# Patient Record
Sex: FEMALE | Race: WHITE | NOT HISPANIC OR LATINO | Employment: OTHER | URBAN - METROPOLITAN AREA
[De-identification: names, ages, dates, MRNs, and addresses within clinical notes are randomized per-mention and may not be internally consistent; named-entity substitution may affect disease eponyms.]

---

## 2019-11-27 ENCOUNTER — APPOINTMENT (OUTPATIENT)
Dept: WOMENS IMAGING | Facility: HOSPITAL | Age: 84
End: 2019-11-27

## 2019-11-27 PROCEDURE — 77063 BREAST TOMOSYNTHESIS BI: CPT | Performed by: RADIOLOGY

## 2019-11-27 PROCEDURE — 77067 SCR MAMMO BI INCL CAD: CPT | Performed by: RADIOLOGY

## 2019-12-23 DIAGNOSIS — N63.20 LEFT BREAST MASS: Primary | ICD-10-CM

## 2019-12-23 DIAGNOSIS — R92.2 INCONCLUSIVE MAMMOGRAM: ICD-10-CM

## 2019-12-26 ENCOUNTER — APPOINTMENT (OUTPATIENT)
Dept: WOMENS IMAGING | Facility: HOSPITAL | Age: 84
End: 2019-12-26

## 2019-12-26 PROCEDURE — 77065 DX MAMMO INCL CAD UNI: CPT | Performed by: RADIOLOGY

## 2019-12-26 PROCEDURE — 76641 ULTRASOUND BREAST COMPLETE: CPT | Performed by: RADIOLOGY

## 2019-12-26 PROCEDURE — G0279 TOMOSYNTHESIS, MAMMO: HCPCS | Performed by: RADIOLOGY

## 2019-12-30 DIAGNOSIS — R92.2 INCONCLUSIVE MAMMOGRAM: ICD-10-CM

## 2019-12-30 DIAGNOSIS — N63.20 LEFT BREAST MASS: ICD-10-CM

## 2019-12-30 DIAGNOSIS — N63.20 LEFT BREAST MASS: Primary | ICD-10-CM

## 2020-01-09 ENCOUNTER — APPOINTMENT (OUTPATIENT)
Dept: WOMENS IMAGING | Facility: HOSPITAL | Age: 85
End: 2020-01-09

## 2020-01-09 PROCEDURE — 19084 BX BREAST ADD LESION US IMAG: CPT | Performed by: RADIOLOGY

## 2020-01-09 PROCEDURE — 19083 BX BREAST 1ST LESION US IMAG: CPT | Performed by: RADIOLOGY

## 2020-01-13 ENCOUNTER — TRANSCRIBE ORDERS (OUTPATIENT)
Dept: MAMMOGRAPHY | Facility: CLINIC | Age: 85
End: 2020-01-13

## 2020-01-13 DIAGNOSIS — C50.919 MALIGNANT NEOPLASM OF FEMALE BREAST, UNSPECIFIED ESTROGEN RECEPTOR STATUS, UNSPECIFIED LATERALITY, UNSPECIFIED SITE OF BREAST (HCC): Primary | ICD-10-CM

## 2020-01-15 DIAGNOSIS — N63.20 LEFT BREAST MASS: ICD-10-CM

## 2020-01-23 ENCOUNTER — NURSE NAVIGATOR (OUTPATIENT)
Dept: OTHER | Facility: HOSPITAL | Age: 85
End: 2020-01-23

## 2020-01-23 ENCOUNTER — OFFICE VISIT (OUTPATIENT)
Dept: MAMMOGRAPHY | Facility: CLINIC | Age: 85
End: 2020-01-23

## 2020-01-23 VITALS
SYSTOLIC BLOOD PRESSURE: 138 MMHG | WEIGHT: 143 LBS | HEART RATE: 76 BPM | TEMPERATURE: 98.9 F | HEIGHT: 66 IN | BODY MASS INDEX: 22.98 KG/M2 | DIASTOLIC BLOOD PRESSURE: 80 MMHG | OXYGEN SATURATION: 98 %

## 2020-01-23 DIAGNOSIS — Z17.0 MALIGNANT NEOPLASM OF UPPER-OUTER QUADRANT OF LEFT BREAST IN FEMALE, ESTROGEN RECEPTOR POSITIVE (HCC): Primary | ICD-10-CM

## 2020-01-23 DIAGNOSIS — C50.412 MALIGNANT NEOPLASM OF UPPER-OUTER QUADRANT OF LEFT BREAST IN FEMALE, ESTROGEN RECEPTOR POSITIVE (HCC): Primary | ICD-10-CM

## 2020-01-23 PROCEDURE — 99205 OFFICE O/P NEW HI 60 MIN: CPT | Performed by: SURGERY

## 2020-01-23 RX ORDER — AMLODIPINE BESYLATE 2.5 MG/1
2.5 TABLET ORAL DAILY
COMMUNITY
Start: 2019-11-09 | End: 2020-05-19 | Stop reason: SDUPTHER

## 2020-01-23 RX ORDER — LEVOTHYROXINE SODIUM 0.07 MG/1
75 TABLET ORAL DAILY
COMMUNITY
End: 2020-06-01 | Stop reason: SDUPTHER

## 2020-01-23 NOTE — PROGRESS NOTES
Chief Complaint: Mela Muse is a 86 y.o.. female here today for Breast Cancer (Left breast)        History of Present Illness:  Patient presents with newly diagnosed breast cancer. 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  She is a nice 86-year-old white female who has not had mammograms since .  She noted some indentation to the skin of the breast in 2019.  This area was not associated with any pain but she did feel a marble sized lump at the time.  She then had imaging studies which revealed a 1.5 cm high density lesion in the upper outer quadrant of the right breast.  Diagnostic imaging showed persistence of this lesion.  An ultrasound was also obtained and that showed a 15 mm irregular hypoechoic mass correlating with the mammogram abnormality.  Note was also made of a 7 mm rounded mass.  Biopsy was recommended on both of these areas.  The lump designated 12:00 revealed a mixed invasive ductal and lobular invasive cancer that was low-grade.  The largest focus of cancer measured 15 mm.  There was no LVI or description of DCIS.  The tumor was ER positive at 95%, MA positive at 96%, and HER-2 negative.  The Ki-67 was 3%.  I have personally reviewed the imaging studies and agree that the 12:00 lesion was very suspicious in appearance.  The area at the 1 o'clock position certainly has benign features and a benign biopsy as well.  The family history is significant for a sister who  of breast cancer at 48 as well as a grandmother on the maternal side who had breast cancer.  Patient herself is also had melanoma and her daughter has had melanoma.  Her other daughter has CLL.      Review of Systems:  Review of Systems   Skin:        The patient denies any noticeable changes to the skin of the breast.    Neurological: Positive for dizziness.   All other systems reviewed and are negative.     I have reviewed the ROS as documented by the MA/LPN/RN Kush Plasencia MD      Past  Medical and Surgical History:  Breast Biopsy History:  Patient has had the following breast biopsies:2020 Left-malignant  Breast Cancer HIstory:  Patient does not have a past medical history of breast cancer.  Breast Operations, and year:  None  Social History     Tobacco Use   Smoking Status Former Smoker   • Last attempt to quit:    • Years since quittin.0     Obstetric History:  Patient is postmenopausal, entered menopause naturally at age: 50   Number of pregnancies:4  Number of live births: 4  Number of abortions or miscarriages: 0  Age of delivery of first child: 21  Patient did not breast feed.  Length of time taking birth control pills:None  Patient has never taken hormone replacement    Past Surgical History:   Procedure Laterality Date   • SKIN GRAFT         Past Medical History:   Diagnosis Date   • Hypertension    • Hypothyroidism    • Melanoma (CMS/HCC)        Prior Hospitalizations, other than for surgery or childbirth, and year:  None    Social History:  Patient is .  Patient has two daughters. and Patient has two sons.    Family History:  Family History   Problem Relation Age of Onset   • Heart failure Father    • Breast cancer Sister    • Esophageal cancer Brother    • Melanoma Daughter    • Breast cancer Maternal Grandmother        Vital Signs:  Vitals:    20 1221   BP: 138/80   Pulse: 76   Temp: 98.9 °F (37.2 °C)   SpO2: 98%       Medications:    Current Outpatient Prescriptions:     Current Outpatient Medications:   •  amLODIPine (NORVASC) 2.5 MG tablet, , Disp: , Rfl:   •  levothyroxine (SYNTHROID, LEVOTHROID) 75 MCG tablet, Take 75 mcg by mouth Daily., Disp: , Rfl:     Physical Examination:  General Appearance:   Patient is in no distress.  She is well kept and has an average build.   Psychiatric:  Patient with appropriate mood and affect. Alert and oriented to self, time, and place.    Breast, RIGHT:  small sized, symmetric with the contralateral side.  Breast skin is  without erythema, edema, rashes.  There are no visible abnormalities upon inspection during the arm-raising maneuver or with hands on hips in the sitting position. There is no nipple retraction, discharge or nipple/areolar skin changes.There are no masses palpable in the sitting or supine positions.    Breast, LEFT:  small sized, symmetric with the contralateral side.  Breast skin is without erythema, edema, rashes.  There are no visible abnormalities upon inspection during the arm-raising maneuver or with hands on hips in the sitting position. There is no nipple retraction, discharge or nipple/areolar skin changes.There is an obvious mass measuring 1.8 cm in the upper outer quadrant about 2 fingerbreadths above the edge of the areola.  It is irregular but mobile.  There is a bit of bruising to the skin from the biopsy.    Lymphatic:  There is no axillary, cervical, infraclavicular, or supraclavicular adenopathy bilaterally.  Eyes:  Pupils are round and reactive to light.  Cardiovascular:  Heart rate and rhythm are regular.  Respiratory:  Lungs are clear bilaterally with no crackles or wheezes in any lung field.  Gastrointestinal:  Abdomen is soft, nondistended, and nontender.  There was no obvious hepatosplenomegaly or abdominal mass.  There are no scars from previous surgery.    Musculoskeletal:  Good strength in all 4 extremities.   There is good range of motion in both shoulders.    Skin:  No new skin lesions or rashes on the skin excluding the breast (see breast exam above).    Cancer Staging  Primary Tumor: T1c  Regional Lymph Nodes:  N0  Distant Mets: M0  Clinical Stage Group: IA    Assessment:  1. Malignant neoplasm of upper-outer quadrant of left breast in female, estrogen receptor positive (CMS/HCC)          Plan:  She was accompanied today by her  and 2 daughters.  The office visit lasted 1 hour and 5 minutes with 50 minutes spent in face-to-face consultation.      We began the conversation  discussing her pathology report.  We talked about the origin of most of breast cancers from either the ducts or the lobules.  The difference between invasive and in situ disease was explained and the visual was drawn for her.  When invasion has occurred, the lymph nodes need to be evaluated.  When there is in situ disease the lymph nodes should be considered if the area of concern is widespread or it is high-grade.  We also discussed the significance of hormone receptors.  They often can give us some idea how the breast cancer will behave and potentially lead us to offer neoadjuvant chemotherapy.    Next we discussed the surgical options which include breast conserving therapy versus a mastectomy.  With breast conserving therapy we are talking about a lumpectomy with margins, lymph node evaluation, and radiation treatment.  The radiation treatment is generally given to the entire breast for 6 weeks.  The side effects consist of local skin change and potential injury to nearby structures such as the lung or the heart.  A mastectomy would involve removing the breast tissues with preservation of the pectoral muscles and evaluation of the lymph nodes if indicated.  The potential for reconstruction by either an implant or autologous tissue was discussed.  This could be performed on a delayed basis or immediately depending on a multitude of factors.  The survival rates for these 2 procedures are equivalent but there are incidences where one may be favored over the other.  There are times when a lumpectomy is not possible due to the large tumor to breast ratio or the location of the tumor.  Previous radiation to the chest wall or collagen disorders such as scleroderma would make radiation treatment and possible and therefore exclude breast conserving therapy as an option.    After discussing the situation with the patient, I think she is a candidate for either breast conserving surgery or mastectomy.  The patient seems to  be leaning towards breast conserving surgery but really want to talk over the situation with the daughter-in-law who is an OB/GYN physician.  She did give me permission to talk with her if she wanted to speak with me.  I do think the patient is a candidate for genetic testing.  I think it would mainly be for the sake of her daughters rather than becoming something that would change her decision regarding breast surgery for herself.  As it stands now, we will proceed with genetic testing when possible but I will await her decision when she calls me back.          EMR Dragon/transcription disclaimer:    Much of this encounter note is an electronic transcription/translocation of spoken language to printed text.  The electronic translation of spoken language may permit erroneous, or at times, nonsensical words or phrases to be inadvertently transcribed.  Although I have reviewed the note from such areas, some may still exist.

## 2020-01-23 NOTE — PROGRESS NOTES
Referral received from Dr. Plasencia's office. Met Ms. Muse and her family during her surgery consult. I introduced myself and navigational services. After the consult she is leaning toward having a lumpectomy. She is comfortable with this plan and had all of her questions or concerns answered. She has a good understanding of her pathology and treatment options presented to her by Dr. Plasencia.     We discussed integrative therapies and other services at the [facility]. I gave her a navigation folder with the following information:    [facility] Support Network, Sharing Our Stories Breast Cancer Support Group, Cancer and Restorative Exercise (CARE), [employer] Exercise program, Together for Breast Cancer Survival, For Women Facing Breast Cancer, Road to Recovery, [employer], [facility], Massage Therapy, Reiki Therapy, [facility] [place], Cancer Nutrition, Survivorship Clinic, Genetic Counseling, Cancer and Career.     She was interested in getting genetic testing done but it will not change her surgery decision. That order has been placed.    She verbalized appreciation for navigational services and she has my contact information and will call with any questions that arise.

## 2020-01-27 ENCOUNTER — PREP FOR SURGERY (OUTPATIENT)
Dept: OTHER | Facility: HOSPITAL | Age: 85
End: 2020-01-27

## 2020-01-27 DIAGNOSIS — C50.412 MALIGNANT NEOPLASM OF UPPER-OUTER QUADRANT OF LEFT BREAST IN FEMALE, ESTROGEN RECEPTOR POSITIVE (HCC): Primary | ICD-10-CM

## 2020-01-27 DIAGNOSIS — Z17.0 MALIGNANT NEOPLASM OF UPPER-OUTER QUADRANT OF LEFT BREAST IN FEMALE, ESTROGEN RECEPTOR POSITIVE (HCC): Primary | ICD-10-CM

## 2020-01-27 RX ORDER — ACETAMINOPHEN 500 MG
1000 TABLET ORAL ONCE
Status: CANCELLED | OUTPATIENT
Start: 2020-02-05 | End: 2020-01-27

## 2020-01-27 RX ORDER — CEFAZOLIN SODIUM 2 G/100ML
2 INJECTION, SOLUTION INTRAVENOUS ONCE
Status: CANCELLED | OUTPATIENT
Start: 2020-02-05 | End: 2020-01-27

## 2020-01-27 RX ORDER — DIAZEPAM 5 MG/1
5 TABLET ORAL ONCE
Status: CANCELLED | OUTPATIENT
Start: 2020-02-05 | End: 2020-01-27

## 2020-01-28 PROBLEM — Z17.0 MALIGNANT NEOPLASM OF UPPER-OUTER QUADRANT OF LEFT BREAST IN FEMALE, ESTROGEN RECEPTOR POSITIVE: Status: ACTIVE | Noted: 2020-01-28

## 2020-01-28 PROBLEM — C50.412 MALIGNANT NEOPLASM OF UPPER-OUTER QUADRANT OF LEFT BREAST IN FEMALE, ESTROGEN RECEPTOR POSITIVE: Status: ACTIVE | Noted: 2020-01-28

## 2020-01-31 ENCOUNTER — APPOINTMENT (OUTPATIENT)
Dept: PREADMISSION TESTING | Facility: HOSPITAL | Age: 85
End: 2020-01-31

## 2020-01-31 VITALS
BODY MASS INDEX: 22.5 KG/M2 | RESPIRATION RATE: 18 BRPM | TEMPERATURE: 97 F | SYSTOLIC BLOOD PRESSURE: 142 MMHG | DIASTOLIC BLOOD PRESSURE: 63 MMHG | WEIGHT: 140 LBS | OXYGEN SATURATION: 98 % | HEIGHT: 66 IN | HEART RATE: 60 BPM

## 2020-01-31 DIAGNOSIS — C50.412 MALIGNANT NEOPLASM OF UPPER-OUTER QUADRANT OF LEFT BREAST IN FEMALE, ESTROGEN RECEPTOR POSITIVE (HCC): ICD-10-CM

## 2020-01-31 DIAGNOSIS — Z17.0 MALIGNANT NEOPLASM OF UPPER-OUTER QUADRANT OF LEFT BREAST IN FEMALE, ESTROGEN RECEPTOR POSITIVE (HCC): ICD-10-CM

## 2020-01-31 LAB
ALBUMIN SERPL-MCNC: 4.2 G/DL (ref 3.5–5.2)
ALBUMIN/GLOB SERPL: 1.6 G/DL
ALP SERPL-CCNC: 58 U/L (ref 39–117)
ALT SERPL W P-5'-P-CCNC: 6 U/L (ref 1–33)
ANION GAP SERPL CALCULATED.3IONS-SCNC: 12.2 MMOL/L (ref 5–15)
AST SERPL-CCNC: 14 U/L (ref 1–32)
BILIRUB SERPL-MCNC: 0.4 MG/DL (ref 0.2–1.2)
BUN BLD-MCNC: 22 MG/DL (ref 8–23)
BUN/CREAT SERPL: 21.6 (ref 7–25)
CALCIUM SPEC-SCNC: 9.4 MG/DL (ref 8.6–10.5)
CHLORIDE SERPL-SCNC: 104 MMOL/L (ref 98–107)
CO2 SERPL-SCNC: 23.8 MMOL/L (ref 22–29)
CREAT BLD-MCNC: 1.02 MG/DL (ref 0.57–1)
DEPRECATED RDW RBC AUTO: 44.5 FL (ref 37–54)
ERYTHROCYTE [DISTWIDTH] IN BLOOD BY AUTOMATED COUNT: 12.9 % (ref 12.3–15.4)
GFR SERPL CREATININE-BSD FRML MDRD: 51 ML/MIN/1.73
GLOBULIN UR ELPH-MCNC: 2.7 GM/DL
GLUCOSE BLD-MCNC: 106 MG/DL (ref 65–99)
HCT VFR BLD AUTO: 42.1 % (ref 34–46.6)
HGB BLD-MCNC: 14 G/DL (ref 12–15.9)
MCH RBC QN AUTO: 31.2 PG (ref 26.6–33)
MCHC RBC AUTO-ENTMCNC: 33.3 G/DL (ref 31.5–35.7)
MCV RBC AUTO: 93.8 FL (ref 79–97)
PLATELET # BLD AUTO: 286 10*3/MM3 (ref 140–450)
PMV BLD AUTO: 9.7 FL (ref 6–12)
POTASSIUM BLD-SCNC: 4.3 MMOL/L (ref 3.5–5.2)
PROT SERPL-MCNC: 6.9 G/DL (ref 6–8.5)
RBC # BLD AUTO: 4.49 10*6/MM3 (ref 3.77–5.28)
SODIUM BLD-SCNC: 140 MMOL/L (ref 136–145)
WBC NRBC COR # BLD: 5.81 10*3/MM3 (ref 3.4–10.8)

## 2020-01-31 PROCEDURE — 80053 COMPREHEN METABOLIC PANEL: CPT | Performed by: SURGERY

## 2020-01-31 PROCEDURE — 93010 ELECTROCARDIOGRAM REPORT: CPT | Performed by: INTERNAL MEDICINE

## 2020-01-31 PROCEDURE — 85027 COMPLETE CBC AUTOMATED: CPT | Performed by: SURGERY

## 2020-01-31 PROCEDURE — 36415 COLL VENOUS BLD VENIPUNCTURE: CPT

## 2020-01-31 PROCEDURE — 93005 ELECTROCARDIOGRAM TRACING: CPT

## 2020-01-31 RX ORDER — MECLIZINE HCL 12.5 MG/1
12.5 TABLET ORAL AS NEEDED
COMMUNITY
End: 2022-07-14 | Stop reason: SDUPTHER

## 2020-02-03 DIAGNOSIS — C50.412 MALIGNANT NEOPLASM OF UPPER-OUTER QUADRANT OF LEFT BREAST IN FEMALE, ESTROGEN RECEPTOR POSITIVE (HCC): Primary | ICD-10-CM

## 2020-02-03 DIAGNOSIS — Z17.0 MALIGNANT NEOPLASM OF UPPER-OUTER QUADRANT OF LEFT BREAST IN FEMALE, ESTROGEN RECEPTOR POSITIVE (HCC): Primary | ICD-10-CM

## 2020-02-05 ENCOUNTER — APPOINTMENT (OUTPATIENT)
Dept: GENERAL RADIOLOGY | Facility: HOSPITAL | Age: 85
End: 2020-02-05

## 2020-02-05 ENCOUNTER — HOSPITAL ENCOUNTER (OUTPATIENT)
Facility: HOSPITAL | Age: 85
Setting detail: HOSPITAL OUTPATIENT SURGERY
Discharge: HOME | End: 2020-02-05
Attending: SURGERY | Admitting: SURGERY

## 2020-02-05 ENCOUNTER — HOSPITAL ENCOUNTER (OUTPATIENT)
Dept: MAMMOGRAPHY | Facility: HOSPITAL | Age: 85
Discharge: HOME | End: 2020-02-05

## 2020-02-05 ENCOUNTER — ANESTHESIA EVENT (OUTPATIENT)
Dept: PERIOP | Facility: HOSPITAL | Age: 85
End: 2020-02-05

## 2020-02-05 ENCOUNTER — ANESTHESIA (OUTPATIENT)
Dept: PERIOP | Facility: HOSPITAL | Age: 85
End: 2020-02-05

## 2020-02-05 VITALS
WEIGHT: 141 LBS | RESPIRATION RATE: 16 BRPM | SYSTOLIC BLOOD PRESSURE: 153 MMHG | HEART RATE: 61 BPM | DIASTOLIC BLOOD PRESSURE: 63 MMHG | BODY MASS INDEX: 22.66 KG/M2 | TEMPERATURE: 97.6 F | OXYGEN SATURATION: 97 % | HEIGHT: 66 IN

## 2020-02-05 DIAGNOSIS — C80.1 CANCER (HCC): ICD-10-CM

## 2020-02-05 DIAGNOSIS — C50.412 MALIGNANT NEOPLASM OF UPPER-OUTER QUADRANT OF LEFT BREAST IN FEMALE, ESTROGEN RECEPTOR POSITIVE (HCC): ICD-10-CM

## 2020-02-05 DIAGNOSIS — Z17.0 MALIGNANT NEOPLASM OF UPPER-OUTER QUADRANT OF LEFT BREAST IN FEMALE, ESTROGEN RECEPTOR POSITIVE (HCC): ICD-10-CM

## 2020-02-05 PROCEDURE — 25010000002 ONDANSETRON PER 1 MG: Performed by: NURSE ANESTHETIST, CERTIFIED REGISTERED

## 2020-02-05 PROCEDURE — 19301 PARTIAL MASTECTOMY: CPT | Performed by: SURGERY

## 2020-02-05 PROCEDURE — 25010000002 PROPOFOL 10 MG/ML EMULSION: Performed by: NURSE ANESTHETIST, CERTIFIED REGISTERED

## 2020-02-05 PROCEDURE — 88341 IMHCHEM/IMCYTCHM EA ADD ANTB: CPT | Performed by: SURGERY

## 2020-02-05 PROCEDURE — 76098 X-RAY EXAM SURGICAL SPECIMEN: CPT

## 2020-02-05 PROCEDURE — 25010000002 FENTANYL CITRATE (PF) 100 MCG/2ML SOLUTION: Performed by: NURSE ANESTHETIST, CERTIFIED REGISTERED

## 2020-02-05 PROCEDURE — 88307 TISSUE EXAM BY PATHOLOGIST: CPT | Performed by: SURGERY

## 2020-02-05 PROCEDURE — 88342 IMHCHEM/IMCYTCHM 1ST ANTB: CPT | Performed by: SURGERY

## 2020-02-05 PROCEDURE — 25010000003 LIDOCAINE 1 % SOLUTION: Performed by: SURGERY

## 2020-02-05 PROCEDURE — 25010000003 CEFAZOLIN IN DEXTROSE 2-4 GM/100ML-% SOLUTION: Performed by: SURGERY

## 2020-02-05 PROCEDURE — 25010000002 DEXAMETHASONE PER 1 MG: Performed by: NURSE ANESTHETIST, CERTIFIED REGISTERED

## 2020-02-05 RX ORDER — PROMETHAZINE HYDROCHLORIDE 25 MG/ML
6.25 INJECTION, SOLUTION INTRAMUSCULAR; INTRAVENOUS
Status: DISCONTINUED | OUTPATIENT
Start: 2020-02-05 | End: 2020-02-05 | Stop reason: HOSPADM

## 2020-02-05 RX ORDER — HYDRALAZINE HYDROCHLORIDE 20 MG/ML
5 INJECTION INTRAMUSCULAR; INTRAVENOUS
Status: DISCONTINUED | OUTPATIENT
Start: 2020-02-05 | End: 2020-02-05 | Stop reason: HOSPADM

## 2020-02-05 RX ORDER — SODIUM CHLORIDE, SODIUM LACTATE, POTASSIUM CHLORIDE, CALCIUM CHLORIDE 600; 310; 30; 20 MG/100ML; MG/100ML; MG/100ML; MG/100ML
9 INJECTION, SOLUTION INTRAVENOUS CONTINUOUS
Status: DISCONTINUED | OUTPATIENT
Start: 2020-02-05 | End: 2020-02-05 | Stop reason: HOSPADM

## 2020-02-05 RX ORDER — PROMETHAZINE HYDROCHLORIDE 25 MG/1
25 SUPPOSITORY RECTAL ONCE AS NEEDED
Status: DISCONTINUED | OUTPATIENT
Start: 2020-02-05 | End: 2020-02-05 | Stop reason: HOSPADM

## 2020-02-05 RX ORDER — SODIUM CHLORIDE 0.9 % (FLUSH) 0.9 %
3-10 SYRINGE (ML) INJECTION AS NEEDED
Status: DISCONTINUED | OUTPATIENT
Start: 2020-02-05 | End: 2020-02-05 | Stop reason: HOSPADM

## 2020-02-05 RX ORDER — DIPHENHYDRAMINE HCL 25 MG
25 CAPSULE ORAL
Status: DISCONTINUED | OUTPATIENT
Start: 2020-02-05 | End: 2020-02-05 | Stop reason: HOSPADM

## 2020-02-05 RX ORDER — LIDOCAINE HYDROCHLORIDE 20 MG/ML
INJECTION, SOLUTION INFILTRATION; PERINEURAL AS NEEDED
Status: DISCONTINUED | OUTPATIENT
Start: 2020-02-05 | End: 2020-02-05 | Stop reason: SURG

## 2020-02-05 RX ORDER — LABETALOL HYDROCHLORIDE 5 MG/ML
5 INJECTION, SOLUTION INTRAVENOUS
Status: DISCONTINUED | OUTPATIENT
Start: 2020-02-05 | End: 2020-02-05 | Stop reason: HOSPADM

## 2020-02-05 RX ORDER — DIAZEPAM 5 MG/1
5 TABLET ORAL ONCE
Status: COMPLETED | OUTPATIENT
Start: 2020-02-05 | End: 2020-02-05

## 2020-02-05 RX ORDER — DEXAMETHASONE SODIUM PHOSPHATE 10 MG/ML
INJECTION INTRAMUSCULAR; INTRAVENOUS AS NEEDED
Status: DISCONTINUED | OUTPATIENT
Start: 2020-02-05 | End: 2020-02-05 | Stop reason: SURG

## 2020-02-05 RX ORDER — LIDOCAINE HYDROCHLORIDE 10 MG/ML
3 INJECTION, SOLUTION INFILTRATION; PERINEURAL ONCE
Status: COMPLETED | OUTPATIENT
Start: 2020-02-05 | End: 2020-02-05

## 2020-02-05 RX ORDER — ACETAMINOPHEN 500 MG
1000 TABLET ORAL ONCE
Status: COMPLETED | OUTPATIENT
Start: 2020-02-05 | End: 2020-02-05

## 2020-02-05 RX ORDER — ONDANSETRON 2 MG/ML
INJECTION INTRAMUSCULAR; INTRAVENOUS AS NEEDED
Status: DISCONTINUED | OUTPATIENT
Start: 2020-02-05 | End: 2020-02-05 | Stop reason: SURG

## 2020-02-05 RX ORDER — NALOXONE HCL 0.4 MG/ML
0.2 VIAL (ML) INJECTION AS NEEDED
Status: DISCONTINUED | OUTPATIENT
Start: 2020-02-05 | End: 2020-02-05 | Stop reason: HOSPADM

## 2020-02-05 RX ORDER — FAMOTIDINE 10 MG/ML
20 INJECTION, SOLUTION INTRAVENOUS ONCE
Status: COMPLETED | OUTPATIENT
Start: 2020-02-05 | End: 2020-02-05

## 2020-02-05 RX ORDER — MIDAZOLAM HYDROCHLORIDE 1 MG/ML
2 INJECTION INTRAMUSCULAR; INTRAVENOUS
Status: DISCONTINUED | OUTPATIENT
Start: 2020-02-05 | End: 2020-02-05 | Stop reason: HOSPADM

## 2020-02-05 RX ORDER — SODIUM CHLORIDE 0.9 % (FLUSH) 0.9 %
3 SYRINGE (ML) INJECTION EVERY 12 HOURS SCHEDULED
Status: DISCONTINUED | OUTPATIENT
Start: 2020-02-05 | End: 2020-02-05 | Stop reason: HOSPADM

## 2020-02-05 RX ORDER — EPHEDRINE SULFATE 50 MG/ML
5 INJECTION, SOLUTION INTRAVENOUS ONCE AS NEEDED
Status: DISCONTINUED | OUTPATIENT
Start: 2020-02-05 | End: 2020-02-05 | Stop reason: HOSPADM

## 2020-02-05 RX ORDER — CEFAZOLIN SODIUM 2 G/100ML
2 INJECTION, SOLUTION INTRAVENOUS ONCE
Status: COMPLETED | OUTPATIENT
Start: 2020-02-05 | End: 2020-02-05

## 2020-02-05 RX ORDER — PROMETHAZINE HYDROCHLORIDE 25 MG/1
25 TABLET ORAL ONCE AS NEEDED
Status: DISCONTINUED | OUTPATIENT
Start: 2020-02-05 | End: 2020-02-05 | Stop reason: HOSPADM

## 2020-02-05 RX ORDER — FENTANYL CITRATE 50 UG/ML
50 INJECTION, SOLUTION INTRAMUSCULAR; INTRAVENOUS
Status: DISCONTINUED | OUTPATIENT
Start: 2020-02-05 | End: 2020-02-05 | Stop reason: HOSPADM

## 2020-02-05 RX ORDER — ACETAMINOPHEN 325 MG/1
650 TABLET ORAL ONCE AS NEEDED
Status: DISCONTINUED | OUTPATIENT
Start: 2020-02-05 | End: 2020-02-05 | Stop reason: HOSPADM

## 2020-02-05 RX ORDER — HYDROCODONE BITARTRATE AND ACETAMINOPHEN 5; 325 MG/1; MG/1
1-2 TABLET ORAL EVERY 4 HOURS PRN
Qty: 8 TABLET | Refills: 0 | Status: SHIPPED | OUTPATIENT
Start: 2020-02-05 | End: 2020-02-27

## 2020-02-05 RX ORDER — ACETAMINOPHEN 500 MG
1000 TABLET ORAL EVERY 6 HOURS PRN
COMMUNITY
End: 2021-09-23 | Stop reason: HOSPADM

## 2020-02-05 RX ORDER — HYDROMORPHONE HYDROCHLORIDE 1 MG/ML
0.5 INJECTION, SOLUTION INTRAMUSCULAR; INTRAVENOUS; SUBCUTANEOUS
Status: DISCONTINUED | OUTPATIENT
Start: 2020-02-05 | End: 2020-02-05 | Stop reason: HOSPADM

## 2020-02-05 RX ORDER — MIDAZOLAM HYDROCHLORIDE 1 MG/ML
1 INJECTION INTRAMUSCULAR; INTRAVENOUS
Status: DISCONTINUED | OUTPATIENT
Start: 2020-02-05 | End: 2020-02-05 | Stop reason: HOSPADM

## 2020-02-05 RX ORDER — BUPIVACAINE HYDROCHLORIDE 2.5 MG/ML
INJECTION, SOLUTION INFILTRATION; PERINEURAL AS NEEDED
Status: DISCONTINUED | OUTPATIENT
Start: 2020-02-05 | End: 2020-02-05 | Stop reason: HOSPADM

## 2020-02-05 RX ORDER — FENTANYL CITRATE 50 UG/ML
INJECTION, SOLUTION INTRAMUSCULAR; INTRAVENOUS AS NEEDED
Status: DISCONTINUED | OUTPATIENT
Start: 2020-02-05 | End: 2020-02-05 | Stop reason: SURG

## 2020-02-05 RX ORDER — ONDANSETRON 4 MG/1
4 TABLET, FILM COATED ORAL EVERY 8 HOURS PRN
Qty: 6 TABLET | Refills: 1 | Status: SHIPPED | OUTPATIENT
Start: 2020-02-05 | End: 2020-02-07

## 2020-02-05 RX ORDER — DIPHENHYDRAMINE HYDROCHLORIDE 50 MG/ML
12.5 INJECTION INTRAMUSCULAR; INTRAVENOUS
Status: DISCONTINUED | OUTPATIENT
Start: 2020-02-05 | End: 2020-02-05 | Stop reason: HOSPADM

## 2020-02-05 RX ORDER — FLUMAZENIL 0.1 MG/ML
0.2 INJECTION INTRAVENOUS AS NEEDED
Status: DISCONTINUED | OUTPATIENT
Start: 2020-02-05 | End: 2020-02-05 | Stop reason: HOSPADM

## 2020-02-05 RX ORDER — PROMETHAZINE HYDROCHLORIDE 25 MG/ML
12.5 INJECTION, SOLUTION INTRAMUSCULAR; INTRAVENOUS ONCE AS NEEDED
Status: DISCONTINUED | OUTPATIENT
Start: 2020-02-05 | End: 2020-02-05 | Stop reason: HOSPADM

## 2020-02-05 RX ORDER — MAGNESIUM HYDROXIDE 1200 MG/15ML
LIQUID ORAL AS NEEDED
Status: DISCONTINUED | OUTPATIENT
Start: 2020-02-05 | End: 2020-02-05 | Stop reason: HOSPADM

## 2020-02-05 RX ORDER — PROPOFOL 10 MG/ML
VIAL (ML) INTRAVENOUS AS NEEDED
Status: DISCONTINUED | OUTPATIENT
Start: 2020-02-05 | End: 2020-02-05 | Stop reason: SURG

## 2020-02-05 RX ORDER — LIDOCAINE HYDROCHLORIDE 10 MG/ML
0.5 INJECTION, SOLUTION EPIDURAL; INFILTRATION; INTRACAUDAL; PERINEURAL ONCE AS NEEDED
Status: DISCONTINUED | OUTPATIENT
Start: 2020-02-05 | End: 2020-02-05 | Stop reason: HOSPADM

## 2020-02-05 RX ORDER — ONDANSETRON 2 MG/ML
4 INJECTION INTRAMUSCULAR; INTRAVENOUS ONCE AS NEEDED
Status: COMPLETED | OUTPATIENT
Start: 2020-02-05 | End: 2020-02-05

## 2020-02-05 RX ADMIN — FENTANYL CITRATE 25 MCG: 50 INJECTION INTRAMUSCULAR; INTRAVENOUS at 12:58

## 2020-02-05 RX ADMIN — ONDANSETRON HYDROCHLORIDE 4 MG: 2 SOLUTION INTRAMUSCULAR; INTRAVENOUS at 13:31

## 2020-02-05 RX ADMIN — ACETAMINOPHEN 1000 MG: 500 TABLET, FILM COATED ORAL at 09:27

## 2020-02-05 RX ADMIN — DEXAMETHASONE SODIUM PHOSPHATE 8 MG: 10 INJECTION INTRAMUSCULAR; INTRAVENOUS at 12:27

## 2020-02-05 RX ADMIN — CEFAZOLIN SODIUM 2 G: 2 INJECTION, SOLUTION INTRAVENOUS at 12:22

## 2020-02-05 RX ADMIN — SODIUM CHLORIDE, POTASSIUM CHLORIDE, SODIUM LACTATE AND CALCIUM CHLORIDE 9 ML/HR: 600; 310; 30; 20 INJECTION, SOLUTION INTRAVENOUS at 10:00

## 2020-02-05 RX ADMIN — ONDANSETRON 4 MG: 2 INJECTION INTRAMUSCULAR; INTRAVENOUS at 14:40

## 2020-02-05 RX ADMIN — FAMOTIDINE 20 MG: 10 INJECTION INTRAVENOUS at 11:35

## 2020-02-05 RX ADMIN — DIAZEPAM 5 MG: 5 TABLET ORAL at 09:28

## 2020-02-05 RX ADMIN — PROPOFOL 150 MG: 10 INJECTION, EMULSION INTRAVENOUS at 12:24

## 2020-02-05 RX ADMIN — LIDOCAINE HYDROCHLORIDE 3 ML: 10 INJECTION, SOLUTION INFILTRATION; PERINEURAL at 10:45

## 2020-02-05 RX ADMIN — LIDOCAINE HYDROCHLORIDE 60 MG: 20 INJECTION, SOLUTION INFILTRATION; PERINEURAL at 12:24

## 2020-02-05 RX ADMIN — FENTANYL CITRATE 25 MCG: 50 INJECTION INTRAMUSCULAR; INTRAVENOUS at 12:40

## 2020-02-05 NOTE — ANESTHESIA PROCEDURE NOTES
Airway  Urgency: elective    Date/Time: 2/5/2020 12:26 PM  Airway not difficult    General Information and Staff    Patient location during procedure: OR  Anesthesiologist: Maxim Nj MD  CRNA: Faina Arroyo CRNA    Indications and Patient Condition  Indications for airway management: airway protection    Preoxygenated: yes  MILS not maintained throughout  Mask difficulty assessment: 0 - not attempted    Final Airway Details  Final airway type: supraglottic airway      Successful airway: unique  Size 4    Number of attempts at approach: 1  Assessment: lips, teeth, and gum same as pre-op and atraumatic intubation

## 2020-02-05 NOTE — ANESTHESIA PREPROCEDURE EVALUATION
Anesthesia Evaluation     NPO Solid Status: > 8 hours             Airway   Mallampati: III  TM distance: <3 FB  Neck ROM: full  No difficulty expected  Dental - normal exam     Pulmonary - normal exam   (-) not a smoker  Cardiovascular - normal exam    ECG reviewed    (+) hypertension well controlled less than 2 medications, CAD, PVD,     ROS comment: Stents in her legs. The left side is partially occluded    Neuro/Psych  (+) dizziness/light headedness, psychiatric history,     GI/Hepatic/Renal/Endo    (+)   thyroid problem     Musculoskeletal     Abdominal    Substance History      OB/GYN          Other   arthritis,    history of cancer                  Anesthesia Plan    ASA 3     general     intravenous induction     Anesthetic plan, all risks, benefits, and alternatives have been provided, discussed and informed consent has been obtained with: patient.

## 2020-02-05 NOTE — OP NOTE
Needle Localization Breast lumpectomy  Procedure Note    Name: Mela Muse  Age: 86 y.o.  Sex: female  :  1933  MRN: [id number]      Pre-operative Diagnosis:left breast cancer    Post-operative Diagnosis: Same    Procedure:left Needle Localization Breast lumpectomy    Surgeon: Kush Plasencia MD.,  FACS    Assistants: None    Anesthesia: General    Indications: This patient recently was diagnosed with left breast cancer after presenting with skin changes to the breast and an abnormal mammogram.  Biopsy has revealed invasive ductal cancer.  She appears to be a candidate for breast conserving measures.  Given the low-grade nature of this tumor and her age, we have elected not to evaluate her lymph nodes.      Procedure Details   The patient was seen in the Holding Room. The risks, benefits, complications, treatment options, and expected outcomes were discussed with the patient. The possibilities of reaction to medication, pulmonary aspiration, bleeding, infection, the need for additional procedures, failure to diagnose a condition, and creating a complication requiring transfusion or operation were discussed with the patient. The patient concurred with the proposed plan, giving informed consent.  The site of surgery properly noted/marked.    The patient underwent preoperative guidewire localization of a mammographic abnormality in the upper outer aspect of the left breast.       The patient was then taken to Operating Room; identified patient as Mela Muse  and the procedure verified as leftNeedle Localization  Breast lumpectomy  A Time Out was held and the above information confirmed.       The breast was prepped and draped in standard fashion. Marcaine  0.25%  was used to anesthetize the skin at the site of the guidewire placement.  A total of 30 cc was used.   A curvilinear incision was created on the breast along the superior edge of the areola near the localization site.  Dissection was  carried down to the localized area which was completely excised in cube like fashion.  A specimen radiograph confirmed inclusion of the preoperatively identified mammographic lesion/ clip.  Additional shave margins were obtained from areas thought to be close.  This included the medial and lateral margins.  Posteriorly we took the dissection all the way down to the pectoralis muscle with removal of the fascia.. Hemostasis was achieved with cautery.  Closure was performed in layers with interrupted 3-0 Vicryl sutures for the deeper layers and a 4-0 Vicryl subcuticular closure.      Steri-Strips were applied. At the end of the operation, all sponge, instrument, and needle counts were correct.    Findings:  There were no unexpected findings.  Estimated Blood Loss:  Minimal           Drains: none          Specimens:   ID Type Source Tests Collected by Time   A : LEFT BREAST LUMPECTOMY, LONG STITCH LATERAL, SHORT STITCH SUPERIOR, DOUBLE STITCH ANTERIOR, FRESH FOR PERMANENT Tissue Breast, Left TISSUE PATHOLOGY EXAM Kush Plasencia MD 2/5/2020 1300   B : LEFT BREAST NEW LATERAL MARGIN, STITCH CALDERON NEW MARGIN, FRESH FOR PERMANENT Tissue Breast, Left TISSUE PATHOLOGY EXAM Kush Plasencia MD 2/5/2020 1308   C : LEFT BREAST NEW MEDIAL MARGIN, STITCH MARKS NEW MARGIN, FRESH FOR PERMANENT Tissue Breast, Left TISSUE PATHOLOGY EXAM Kush Plasencia MD 2/5/2020 1310       Complications:  None; patient tolerated the procedure well.           Condition: stable

## 2020-02-05 NOTE — ANESTHESIA POSTPROCEDURE EVALUATION
"Patient: Mela Muse    Procedure Summary     Date:  02/05/20 Room / Location:  [facility] OR  / [facility] MAIN OR    Anesthesia Start:  1215 Anesthesia Stop:  1359    Procedure:  BREAST LUMPECTOMY WITH NEEDLE LOCALIZATION (Left Breast) Diagnosis:       Malignant neoplasm of upper-outer quadrant of left breast in female, estrogen receptor positive (CMS/HCC)      (Malignant neoplasm of upper-outer quadrant of left breast in female, estrogen receptor positive (CMS/HCC) [C50.412, Z17.0])    Surgeon:  Kush Plasencia MD Provider:  Maxim Nj MD    Anesthesia Type:  general ASA Status:  3          Anesthesia Type: general    Vitals  Vitals Value Taken Time   /79 2/5/2020  3:30 PM   Temp 36.4 °C (97.6 °F) 2/5/2020  1:55 PM   Pulse 60 2/5/2020  3:30 PM   Resp 16 2/5/2020  3:30 PM   SpO2 94 % 2/5/2020  3:30 PM           Post Anesthesia Care and Evaluation    Patient location during evaluation: bedside  Patient participation: complete - patient participated  Level of consciousness: awake and alert  Pain management: adequate  Airway patency: patent  Anesthetic complications: No anesthetic complications    Cardiovascular status: acceptable  Respiratory status: acceptable  Hydration status: acceptable    Comments: BP (!) 164/105   Pulse 60   Temp 36.4 °C (97.6 °F) (Oral)   Resp 16   Ht 167.6 cm (66\")   Wt 64 kg (141 lb)   SpO2 91%   BMI 22.76 kg/m²       "

## 2020-02-10 ENCOUNTER — TELEPHONE (OUTPATIENT)
Dept: MAMMOGRAPHY | Facility: CLINIC | Age: 85
End: 2020-02-10

## 2020-02-10 LAB
CYTO UR: NORMAL
LAB AP CASE REPORT: NORMAL
LAB AP DIAGNOSIS COMMENT: NORMAL
LAB AP SYNOPTIC CHECKLIST: NORMAL
PATH REPORT.ADDENDUM SPEC: NORMAL
PATH REPORT.FINAL DX SPEC: NORMAL
PATH REPORT.GROSS SPEC: NORMAL

## 2020-02-18 ENCOUNTER — OFFICE VISIT (OUTPATIENT)
Dept: MAMMOGRAPHY | Facility: CLINIC | Age: 85
End: 2020-02-18

## 2020-02-18 VITALS — DIASTOLIC BLOOD PRESSURE: 60 MMHG | SYSTOLIC BLOOD PRESSURE: 140 MMHG

## 2020-02-18 DIAGNOSIS — C50.412 MALIGNANT NEOPLASM OF UPPER-OUTER QUADRANT OF LEFT BREAST IN FEMALE, ESTROGEN RECEPTOR POSITIVE (HCC): Primary | ICD-10-CM

## 2020-02-18 DIAGNOSIS — Z17.0 MALIGNANT NEOPLASM OF UPPER-OUTER QUADRANT OF LEFT BREAST IN FEMALE, ESTROGEN RECEPTOR POSITIVE (HCC): Primary | ICD-10-CM

## 2020-02-18 PROCEDURE — 99024 POSTOP FOLLOW-UP VISIT: CPT | Performed by: SURGERY

## 2020-02-24 NOTE — PROGRESS NOTES
Subjective     REASON FOR CONSULTATION: Newly diagnosed breast Cancer  Provide an opinion on any further workup or treatment                             REQUESTING PHYSICIAN:  Kush Plasencia MD    RECORDS OBTAINED:  Records of the patients history including those obtained from the referring provider were reviewed and summarized in detail.    HISTORY OF PRESENT ILLNESS:  The patient is a 86 y.o. year old female who is here for an opinion about the above issue.    She is currently followed by PCP Dr. Yuniel Bonilla.  Patient had never had mammograms since 2013.  In October 2019 she noted indentation of the skin in the breast.  She did not have pain but she did feel a marble size lump at the time.  She had a screening mammogram in November 2019 which had revealed a 12 mm mass in the middle one-third region of the left breast at 1:00 position 4 cmfn.  She, then, went for a diagnostic mammogram and ultrasound in December.  The mammogram revealed an irregular 15 mm mass in the left breast at the 12:00 position and the ultrasound revealed an irregular [id number] mm mass in the left breast at the 12:00 position 4 cmfn and an oval elongated mass measuring 7x3x5 mm in the left breast at the 1:00 position.  She was referred to Dr. Plasencia who performed an US-guided biopsy which showed Invasive Carcinoma with Ductal and Lobular features, low grade, measuring 15 mm, ER+ 96%, IL+ 96%, HER2/clayton negative (0), Ki67 3%.  The mass at the 1:00 position was a fibroadenoma.  She underwent a left breast lumpectomy on February 5, 2020 which confirmed Invasive Lobular Carcinoma of the Left Breast measuring 20 mm, grade II.  Her tumor was ER+ 96%, IL+ 96%, and HER2/clayton Negative (0).    Her personal history consists of vertigo, skin cancer of right leg in 2008 and of left nose in 2015.  She had a melanoma in 2017.    Her family history consists of her sister who had breast cancer at age 46.  Her maternal grandmother had breast cancer in  her 70s.  Her daughter has chronic lymphocytic leukemia.        Details of the diagnostics scans and procedures are as follows:    11/27/19 - Screening Mammogram  Finding 1: There is a new irregular mass measuring 12 mm with spiculated margins seen inthe middle one-third region of the left beast at the 1:00 position located 4 cmfn.    Finding 2: There are vascular calcifications seen in both breasts.  No suspicious masses in the right breast.    12/26/19 - Diagnostic Mammogram  Finding 1: There is an irregular mass measuring 15 mm with spiculated margins in the upper outer region of the left breast 4 cmfn.      Ultrasound:  Finding 1: Irregular hypoechoic mass measuring [id number] mm at 12:00 4cmfn.    Finding 2: There is an oval elongated mass with indistinct margins measuring 7x3x5 mm in the left breast at 1:00, 1.7 cm from the mass at 12:00.    01/09/20 - US-Guided Biopsy  Left Breast 12:00 Position   Invasive Carcinoma with Ductal and Lobular features, low grade, measuring 15 mm, ER+ 96%, CO+ 96%, HER2/clayton negative (0), Ki67 3%    Left Breast 1:00 Position  Fibroadenoma    02/05/20 - Left Breast Lumpectomy   A. Invasive lobular carcinoma:                            1. Invasive carcinoma measures 20 mm x  14 mm x 10 mm.                            2. Overall Wyatt grade II (tubular score = 3, nuclear score = 2,                                mitotic score = 1).                            3. No lymphovascular invasion identified.                            4. Microcalcifications are present in the invasive component.               B. No carcinoma in situ component identified.               C. All margins are negative for invasive carcinoma.                    Carcinoma measures 2.0 mm from the closest (Medial) margin of excision.                     All other margins measure at least 4.0 mm from invasive carcinoma including:                            Anterior margin = 5 mm                            Posterior  margin = 10 mm                            Superior margin = 20 mm                            Inferior margin =15mm                             Lateral margin = 4 mm                            Medial margin = 2 mm                  D. Rare focus of atypical lobular hyperplasia (ALH) noted, Not present at the margin. Focal fibroadenoma                    involved by invasive lobular carcinoma noted, Not present at the margin.               E. Focal biopsy site changes are identified.               F. Focal atypical ductal hyperplasia noted, not present at the margin.               G. Non-neoplastic breast tissue with intraductal papilloma, adenosis and fibrocystic change noted.                    Microcalcification noted in benign breast tissue.               H. Previous Biomarkers: Estrogen receptors: positive (95.53%), Progesterone receptors: positive (96.15%),                    HER/2-clayton: negative (0), Ki-67 = 3.34%    TUMOR   Tumor Site  Upper outer quadrant    Histologic Type  Invasive lobular carcinoma    Glandular (Acinar) / Tubular Differentiation  Score 3    Nuclear Pleomorphism  Score 2    Mitotic Rate  Score 1    Overall Grade  Grade 2 (scores of 6 or 7)    Tumor Size  Greatest dimension of largest invasive focus (Millimeters): 20 mm   Additional Dimension (Millimeters)  14 mm     10 mm   Tumor Focality  Single focus of invasive carcinoma    Ductal Carcinoma In Situ (DCIS)  Not identified    Lobular Carcinoma In Situ (LCIS)  No LCIS in specimen    Tumor Extent     Lymphovascular Invasion  Not identified    Dermal Lymphovascular Invasion  No skin present    Microcalcifications  Present in invasive carcinoma      Present in non-neoplastic tissue    Treatment Effect  No known presurgical therapy    MARGINS   Invasive Carcinoma Margins  Uninvolved by invasive carcinoma    Distance from Closest Margin (Millimeters)  5 mm   Closest Margin  Anterior    Distance from Other Margins     Anterior Margin (Millimeters)   "5 mm   Posterior Margin (Millimeters)  10 mm   Superior Margin (Millimeters)  20 mm   Inferior Margin (Millimeters)  15 mm   Medial Margin (Millimeters)  8 mm   Lateral Margin (Millimeters)  10 mm   LYMPH NODES   Regional Lymph Nodes  No lymph nodes submitted or found    PATHOLOGIC STAGE CLASSIFICATION (pTNM, AJCC 8th Edition)   Primary Tumor (pT)  pT1c    Regional Lymph Nodes (pN)     Category (pN)  pNX    SPECIAL STUDIES   Breast Biomarker Testing Performed on Previous Biopsy     Estrogen Receptor (ER)  Positive (percentage): 95.5 %   Breast Biomarker Testing Performed on Previous Biopsy     Progesterone Receptor (PgR)  Positive (percentage): 96.2 %   Breast Biomarker Testing Performed on Previous Biopsy     HER2 (by immunohistochemistry)  Negative (Score 0)    Testing Performed on Case Number  [id number] (Not reviewed)    Comment(s)   Comment(s)  Ki67= 3.34%        OB-GYN:  Menarche- 13  Menopause- 50  Pregnancies-  A0  Post menopausal HRT- Premarin for two years.  Hx of birth control pills- None    SOCIAL HISTORY:  Patient is retired and is .  She is a former smoker and she quit in .  She doesn't drink alcohol.    Past Medical History:   Diagnosis Date   • Anxiety    • Arthritis    • Cancer (CMS/HCC) 2020    left breast   • Coronary artery disease     \"BLOCKAGE IN MY LEGS\"   • History of headaches    • Hypertension    • Hypothyroidism    • Melanoma (CMS/HCC)     LEFT LEG   • Skin cancer     left nose   • Skin cancer     right leg   • Vertigo         Past Surgical History:   Procedure Laterality Date   • BREAST LUMPECTOMY WITH SENTINEL NODE BIOPSY Left 2020    Procedure: BREAST LUMPECTOMY WITH NEEDLE LOCALIZATION;  Surgeon: Kush Plasencia MD;  Location: [facility];  Service: General;  Laterality: Left;   • BREAST SURGERY Left 2020    BIOPSY   • COLONOSCOPY      ALL OK   • COSMETIC SURGERY      AFTER SKIN EXCISION FROM NOSE   • ENDOSCOPY     • EYE SURGERY Bilateral     " CATARACTS   • LEG SURGERY Left 2017    MELANOMA EXCISION-WITH SKIN GRAFT   • SKIN BIOPSY  2008   • SKIN CANCER EXCISION Bilateral 2015    NOSE, UPPER LIP   • SKIN GRAFT Bilateral    • VASCULAR SURGERY Bilateral 2018    STENTS IN BOTH UPPER LEGS        Current Outpatient Medications on File Prior to Visit   Medication Sig Dispense Refill   • acetaminophen (TYLENOL) 500 MG tablet Take 1,000 mg by mouth Every 6 (Six) Hours As Needed for Mild Pain .     • amLODIPine (NORVASC) 2.5 MG tablet Take 2.5 mg by mouth Daily.     • levothyroxine (SYNTHROID, LEVOTHROID) 75 MCG tablet Take 75 mcg by mouth Daily.     • meclizine (ANTIVERT) 12.5 MG tablet Take 12.5 mg by mouth As Needed for Dizziness.     • HYDROcodone-acetaminophen (NORCO) 5-325 MG per tablet Take 1-2 tablets by mouth Every 4 (Four) Hours As Needed (Pain). 8 tablet 0     No current facility-administered medications on file prior to visit.         ALLERGIES:    Allergies   Allergen Reactions   • Codeine Mental Status Change        Social History     Socioeconomic History   • Marital status:      Spouse name: Vladimir   • Number of children: Not on file   • Years of education: Not on file   • Highest education level: Not on file   Occupational History     Employer: RETIRED   Tobacco Use   • Smoking status: Former Smoker     Last attempt to quit: 1970     Years since quittin.1   • Smokeless tobacco: Never Used   Substance and Sexual Activity   • Alcohol use: Never     Frequency: Never   • Drug use: Never   • Sexual activity: Defer        Family History   Problem Relation Age of Onset   • Heart failure Father    • Skin cancer Father    • Breast cancer Sister 46   • Esophageal cancer Brother    • Leukemia Daughter    • Breast cancer Maternal Grandmother         70s   • Colon cancer Maternal Grandmother         Review of Systems   Constitutional: Negative.    Eyes: Negative.    Respiratory: Negative.    Cardiovascular: Negative.    Gastrointestinal: Negative.   "  Endocrine: Negative.    Genitourinary: Negative.    Musculoskeletal: Negative.    Skin: Negative.    Allergic/Immunologic: Negative.    Neurological: Positive for dizziness and headaches.   Hematological: Negative.    Psychiatric/Behavioral: Negative.         Objective     Vitals:    02/25/20 1404   BP: 178/71   Pulse: 69   Resp: 14   Temp: 98.1 °F (36.7 °C)   TempSrc: Oral   SpO2: 96%   Weight: 62.5 kg (137 lb 11.2 oz)   Height: 167 cm (65.75\")  Comment: new ht without shoes   PainSc: 0-No pain     Current Status 2/25/2020   ECOG score 0       Physical Exam    GENERAL:  Well-developed, well-nourished in no acute distress.   SKIN:  Warm, dry without rashes, purpura or petechiae.  NECK:  Supple with good range of motion; no thyromegaly or masses, no JVD.  LYMPHATICS:  No cervical, supraclavicular, axillary or inguinal adenopathy.  CHEST:  Lungs clear to auscultation. Good airflow.  BREAST: Right breast: No skin changes, no evidence of breast mass, no nipple discharge, no evidence of any right axillary adenopathy or right supraclavicular adenopathy  Left breast: S/p left breast lumpectomy.  No evidence of any skin changes, no evidence of any left breast mass and no evidence of left nipple discharge as well as no left axillary adenopathy or left supraclavicular adenopathy.  CARDIAC:  Regular rate and rhythm without murmurs, rubs or gallops. Normal S1,S2.  ABDOMEN:  Soft, nontender with no hepatosplenomegaly or masses.  EXTREMITIES:  No clubbing, cyanosis or edema.  NEUROLOGICAL:  Cranial Nerves II-XII grossly intact.  No focal neurological deficits.  PSYCHIATRIC:  Normal affect and mood.      RECENT LABS:  Hematology WBC   Date Value Ref Range Status   02/25/2020 7.17 3.40 - 10.80 10*3/mm3 Final   09/17/2018 6.22 4.5 - 11.0 10*3/uL Final     RBC   Date Value Ref Range Status   02/25/2020 5.05 3.77 - 5.28 10*6/mm3 Final   09/17/2018 4.34 4.0 - 5.2 10*6/uL Final     Hemoglobin   Date Value Ref Range Status   02/25/2020 " 15.4 12.0 - 15.9 g/dL Final   09/17/2018 13.2 12.0 - 16.0 g/dL Final     Hematocrit   Date Value Ref Range Status   02/25/2020 46.4 34.0 - 46.6 % Final   09/17/2018 41.3 36.0 - 46.0 % Final     Platelets   Date Value Ref Range Status   02/25/2020 179 140 - 450 10*3/mm3 Final   09/17/2018 457 (H) 140 - 440 10*3/uL Final          Assessment/Plan     1. pT1c pNX Invasive Lobular Carcinoma of the Left Breast measuring 20 mm, grade II.  Her tumor was ER+ 96%, ID+ 96%, and HER2/clayton Negative (0) with Ki67 of 3.34%.    -Screening mammogram, 12 mm mass, spiculated margins left breast, 1 o'clock position, 4 cm from the nipple  -Diagnostic mammogram, irregular 15 mm mass left breast , 4 cm from the nipple  -Ultrasound showed irregular mass 15 x 13 x 13 at 12:00, oval mass with indistinct margin 7 x 3 x 5 mm left breast at 1:00  -Left breast ultrasound-guided biopsy, 12 o'clock position, invasive carcinoma with ductal and lobular features  -left breast C, 1 o'clock position, fibroadenoma  -February 5, 2020, left breast lumpectomy, invasive lobular carcinoma, 20 x 14 x 10 mm, grade 2, Bekah score of 5 no lymphovascular space invasion, no evidence of DCIS, margins are all negative, rare focus of atypical lobular hyperplasia, not present at margin,    · I had a lengthy discussion with the patient and family that at the age of 86 years given that this is highly ER ID positive tumor, HER-2 negative, she is not a candidate for any chemotherapy given her age.  I do not see any need to obtain an Oncotype DX in the setting.  She is certainly a candidate for endocrine therapy.  My question would be if sentinel lymph node has to be considered given that she had a 20 mm invasive lobular carcinoma.  However patient is being seen by radiation oncology and will get input from them as well to see if axilla would be included.  Will discuss with Dr. Plasencia.  · Patient to consult radiation oncology for opinion on 03/03/20.  Given patient's  cancer was ER/DC+, she will certainly benefit from endocrine therapy.  I discussed in length with patient on the 3 different endocrine therapies tamoxifen/aromatase inhibitors/Evista.  Discussed Tamoxifen is for pre-menopausal women and other two are for post-menopausal women. Tamoxifen is favored for osteoporotic women.  I have detailed the side effects of tamoxifen including depression, hot flashes, rare chance for uterine cancer, weight gain, blood clots, DVT, PE, hair thinning, rare chance for thrombocytopenia, cataracts.  Aromatase inhibitors can cause arthralgias, hot flashes, decrease in bone density, rare chance for diarrhea, also discussed about hot flashes with it.  Evista is approved for osteopenia and can also be used for prophylaxis with fewer side effects except hot flashes and rare chance for cataracts but it can improve bone density.   Given her past medical history and current health, I think Arimidex would be the best choice of treatment.    2. Family history of malignancy.  Her family history consists of her sister who had breast cancer at age 46.  Her maternal grandmother had breast cancer in her 70s.  Her daughter has chronic lymphocytic leukemia.  · Genetic testing could be considered to see if that information benefits the rest of the family.        3. History of skin cancer in the right leg (2008) and left nose (2015).    4. History of Melanoma of the left leg (2017).    5. Vertigo.  Patient reports feeling dizzy constantly.      PLAN:    1. Patient was educated about her treatment options with endocrine therapy.  I told her about the side effects of the treatment as well as how those effects would be managed.  Given her age and past medical history, Arimidex would be the best choice of endocrine therapy.  2. No Plans on any chemotherapy given her age and no plans of obtaining Oncotype DX testing as patients in this age range cannot tolerate chemotherapy.  3. Patient to consult radiation  oncology on 03/03.   4. Will discuss with Dr. Plasencia and radiation to see if any need for sentinel lymph node biopsy in the setting.  However if patient is undergoing radiation may not need it.  5. If patient requires radiation, then she can begin Arimidex towards the end of radiation.  6. Ordered DEXA scan today.  7. Advised patient to take calcium 600 mg twice daily and vitamin D 1000 IU once daily  8. Follow up with Kaushik in 8 weeks with labs; DEXA scan 1 week prior    I, Kavitha Sprague, acted as scribe for Sowmya Faulkner MD  in documenting the service or procedure. To the best of my knowledge, I recorded what was dictated by Sowmya Faulkner MD      Thank you for allowing me to participate in the care of this patient     Sowmya Faulkner MD  02/25/20       Cc:  MD Sima Costa MD Stephen Self, MD Stuart Spalding, MD

## 2020-02-25 ENCOUNTER — LAB (OUTPATIENT)
Dept: LAB | Facility: HOSPITAL | Age: 85
End: 2020-02-25

## 2020-02-25 ENCOUNTER — CONSULT (OUTPATIENT)
Dept: ONCOLOGY | Facility: CLINIC | Age: 85
End: 2020-02-25

## 2020-02-25 VITALS
TEMPERATURE: 98.1 F | HEART RATE: 69 BPM | RESPIRATION RATE: 14 BRPM | WEIGHT: 137.7 LBS | BODY MASS INDEX: 22.13 KG/M2 | SYSTOLIC BLOOD PRESSURE: 178 MMHG | OXYGEN SATURATION: 96 % | HEIGHT: 66 IN | DIASTOLIC BLOOD PRESSURE: 71 MMHG

## 2020-02-25 DIAGNOSIS — Z85.828 HISTORY OF SKIN CANCER: ICD-10-CM

## 2020-02-25 DIAGNOSIS — Z78.0 POST-MENOPAUSAL: ICD-10-CM

## 2020-02-25 DIAGNOSIS — Z85.820 HISTORY OF MELANOMA: ICD-10-CM

## 2020-02-25 DIAGNOSIS — Z09 ONCOLOGY FOLLOW-UP ENCOUNTER: Primary | ICD-10-CM

## 2020-02-25 DIAGNOSIS — R42 VERTIGO: ICD-10-CM

## 2020-02-25 DIAGNOSIS — Z17.0 MALIGNANT NEOPLASM OF UPPER-OUTER QUADRANT OF LEFT BREAST IN FEMALE, ESTROGEN RECEPTOR POSITIVE (HCC): Primary | ICD-10-CM

## 2020-02-25 DIAGNOSIS — R79.89 ABNORMAL CBC: ICD-10-CM

## 2020-02-25 DIAGNOSIS — C50.412 MALIGNANT NEOPLASM OF UPPER-OUTER QUADRANT OF LEFT BREAST IN FEMALE, ESTROGEN RECEPTOR POSITIVE (HCC): Primary | ICD-10-CM

## 2020-02-25 DIAGNOSIS — M81.0 OSTEOPOROSIS, UNSPECIFIED OSTEOPOROSIS TYPE, UNSPECIFIED PATHOLOGICAL FRACTURE PRESENCE: ICD-10-CM

## 2020-02-25 LAB
BASOPHILS # BLD AUTO: 0.11 10*3/MM3 (ref 0–0.2)
BASOPHILS NFR BLD AUTO: 1.5 % (ref 0–1.5)
DEPRECATED RDW RBC AUTO: 43 FL (ref 37–54)
EOSINOPHIL # BLD AUTO: 0.17 10*3/MM3 (ref 0–0.4)
EOSINOPHIL NFR BLD AUTO: 2.4 % (ref 0.3–6.2)
ERYTHROCYTE [DISTWIDTH] IN BLOOD BY AUTOMATED COUNT: 12.8 % (ref 12.3–15.4)
HCT VFR BLD AUTO: 46.4 % (ref 34–46.6)
HGB BLD-MCNC: 15.4 G/DL (ref 12–15.9)
IMM GRANULOCYTES # BLD AUTO: 0.02 10*3/MM3 (ref 0–0.05)
IMM GRANULOCYTES NFR BLD AUTO: 0.3 % (ref 0–0.5)
LYMPHOCYTES # BLD AUTO: 1.4 10*3/MM3 (ref 0.7–3.1)
LYMPHOCYTES NFR BLD AUTO: 19.5 % (ref 19.6–45.3)
MCH RBC QN AUTO: 30.5 PG (ref 26.6–33)
MCHC RBC AUTO-ENTMCNC: 33.2 G/DL (ref 31.5–35.7)
MCV RBC AUTO: 91.9 FL (ref 79–97)
MONOCYTES # BLD AUTO: 0.55 10*3/MM3 (ref 0.1–0.9)
MONOCYTES NFR BLD AUTO: 7.7 % (ref 5–12)
NEUTROPHILS # BLD AUTO: 4.92 10*3/MM3 (ref 1.7–7)
NEUTROPHILS NFR BLD AUTO: 68.6 % (ref 42.7–76)
NRBC BLD AUTO-RTO: 0 /100 WBC (ref 0–0.2)
PLATELET # BLD AUTO: 179 10*3/MM3 (ref 140–450)
PMV BLD AUTO: 10.4 FL (ref 6–12)
RBC # BLD AUTO: 5.05 10*6/MM3 (ref 3.77–5.28)
WBC NRBC COR # BLD: 7.17 10*3/MM3 (ref 3.4–10.8)

## 2020-02-25 PROCEDURE — 85025 COMPLETE CBC W/AUTO DIFF WBC: CPT

## 2020-02-25 PROCEDURE — 36415 COLL VENOUS BLD VENIPUNCTURE: CPT

## 2020-02-25 PROCEDURE — 99205 OFFICE O/P NEW HI 60 MIN: CPT | Performed by: INTERNAL MEDICINE

## 2020-02-26 PROBLEM — Z78.0 POST-MENOPAUSAL: Status: ACTIVE | Noted: 2020-02-26

## 2020-02-26 PROBLEM — R42 VERTIGO: Status: ACTIVE | Noted: 2020-02-26

## 2020-02-26 PROBLEM — Z85.820 HISTORY OF MELANOMA: Status: ACTIVE | Noted: 2020-02-26

## 2020-02-26 PROBLEM — M81.0 OSTEOPOROSIS: Status: ACTIVE | Noted: 2020-02-26

## 2020-02-26 PROBLEM — Z85.828 HISTORY OF SKIN CANCER: Status: ACTIVE | Noted: 2020-02-26

## 2020-02-27 ENCOUNTER — OFFICE VISIT (OUTPATIENT)
Dept: FAMILY MEDICINE CLINIC | Facility: CLINIC | Age: 85
End: 2020-02-27

## 2020-02-27 VITALS
OXYGEN SATURATION: 97 % | HEIGHT: 65 IN | BODY MASS INDEX: 23.31 KG/M2 | TEMPERATURE: 97.3 F | WEIGHT: 139.9 LBS | DIASTOLIC BLOOD PRESSURE: 70 MMHG | HEART RATE: 71 BPM | SYSTOLIC BLOOD PRESSURE: 118 MMHG

## 2020-02-27 DIAGNOSIS — C50.412 MALIGNANT NEOPLASM OF UPPER-OUTER QUADRANT OF LEFT BREAST IN FEMALE, ESTROGEN RECEPTOR POSITIVE (HCC): ICD-10-CM

## 2020-02-27 DIAGNOSIS — Z17.0 MALIGNANT NEOPLASM OF UPPER-OUTER QUADRANT OF LEFT BREAST IN FEMALE, ESTROGEN RECEPTOR POSITIVE (HCC): ICD-10-CM

## 2020-02-27 DIAGNOSIS — R42 DIZZINESS: Primary | ICD-10-CM

## 2020-02-27 DIAGNOSIS — I10 ESSENTIAL HYPERTENSION: ICD-10-CM

## 2020-02-27 PROBLEM — I73.9 PAD (PERIPHERAL ARTERY DISEASE): Status: ACTIVE | Noted: 2018-08-10

## 2020-02-27 PROBLEM — I65.23 ASYMPTOMATIC BILATERAL CAROTID ARTERY STENOSIS: Status: ACTIVE | Noted: 2019-12-10

## 2020-02-27 PROBLEM — I73.9 CLAUDICATION OF BOTH LOWER EXTREMITIES: Status: ACTIVE | Noted: 2018-08-10

## 2020-02-27 PROCEDURE — G0439 PPPS, SUBSEQ VISIT: HCPCS | Performed by: FAMILY MEDICINE

## 2020-02-27 PROCEDURE — 99214 OFFICE O/P EST MOD 30 MIN: CPT | Performed by: FAMILY MEDICINE

## 2020-02-27 NOTE — PROGRESS NOTES
The ABCs of the Annual Wellness Visit  Subsequent Medicare Wellness Visit    Chief Complaint   Patient presents with   • Dizziness     pt is c/o of dizziness for about 2 weeks        Subjective   History of Present Illness:  Mela Muse is a 86 y.o. female who presents for a Subsequent Medicare Wellness Visit.  C/o trouble with dizziness.  My head fells like it is floating.  Going on 9 days.  CBC normal 2 days ago.    F/U L breast cancer.  Had breast conserving lumpectomy.  20mm size.  Seeing Dr Plasencia and Dr Faulkner.  Notes reviewed from  and  respectively.  Seeing rad onc 3/3 with Dr Joiner.  Some help with meclizine.    FU HTN.  No orthostasis.  Doing well with meds.  Cmp unrevealing 3 weeks ago.    HEALTH RISK ASSESSMENT    Recent Hospitalizations:  Recently treated at the following:  [facility]    Current Medical Providers:  Patient Care Team:  Yuniel Bonilla MD as PCP - General  Yuniel Bonilla MD as PCP - Family Medicine  Self, Khoa Quick MD (Vascular Surgery)  Sima Joiner MD as Consulting Physician (Radiation Oncology)  Sowmya Faulkner MD as Consulting Physician (Hematology and Oncology)  Kush Plasencia MD as Referring Physician (Breast Surgery)    Smoking Status:  Social History     Tobacco Use   Smoking Status Former Smoker   • Last attempt to quit: 1970   • Years since quittin.1   Smokeless Tobacco Never Used       Alcohol Consumption:  Social History     Substance and Sexual Activity   Alcohol Use Never   • Frequency: Never       Depression Screen:   PHQ-2/PHQ-9 Depression Screening 2020   Little interest or pleasure in doing things 3   Feeling down, depressed, or hopeless 0   Trouble falling or staying asleep, or sleeping too much 0   Feeling tired or having little energy 3   Poor appetite or overeating 0   Feeling bad about yourself - or that you are a failure or have let yourself or your family down 0   Trouble concentrating on  things, such as reading the newspaper or watching television 0   Moving or speaking so slowly that other people could have noticed. Or the opposite - being so fidgety or restless that you have been moving around a lot more than usual 0   Thoughts that you would be better off dead, or of hurting yourself in some way 0   Total Score 6       Fall Risk Screen:  N[facility]NAEL Fall Risk Assessment was completed, and patient is at LOW risk for falls.Assessment completed on:2/27/2020    Health Habits and Functional and Cognitive Screening:  Functional & Cognitive Status 2/27/2020   Do you have difficulty preparing food and eating? No   Do you have difficulty bathing yourself, getting dressed or grooming yourself? No   Do you have difficulty using the toilet? No   Do you have difficulty moving around from place to place? No   Do you have trouble with steps or getting out of a bed or a chair? No   Current Diet Well Balanced Diet   Dental Exam Up to date   Eye Exam Up to date   Exercise (times per week) 0 times per week   Current Exercise Activities Include None   Do you need help using the phone?  No   Are you deaf or do you have serious difficulty hearing?  No   Do you need help with transportation? No   Do you need help shopping? No   Do you need help preparing meals?  No   Do you need help with housework?  No   Do you need help with laundry? No   Do you need help taking your medications? No   Do you need help managing money? No   Do you ever drive or ride in a car without wearing a seat belt? No   Have you felt unusual stress, anger or loneliness in the last month? No   Who do you live with? Spouse   If you need help, do you have trouble finding someone available to you? No   Have you been bothered in the last four weeks by sexual problems? No   Do you have difficulty concentrating, remembering or making decisions? No         Does the patient have evidence of cognitive impairment? No    Asprin use counseling:Does not need ASA  (and currently is not on it)    Age-appropriate Screening Schedule:  Refer to the list below for future screening recommendations based on patient's age, sex and/or medical conditions. Orders for these recommended tests are listed in the plan section. The patient has been provided with a written plan.    Health Maintenance   Topic Date Due   • TDAP/TD VACCINES (1 - Tdap) 02/28/1944   • ZOSTER VACCINE (1 of 2) 02/28/1983   • DXA SCAN  02/25/2020   • INFLUENZA VACCINE  Completed          The following portions of the patient's history were reviewed and updated as appropriate: allergies, current medications, past family history, past medical history, past social history, past surgical history and problem list.    Outpatient Medications Prior to Visit   Medication Sig Dispense Refill   • acetaminophen (TYLENOL) 500 MG tablet Take 1,000 mg by mouth Every 6 (Six) Hours As Needed for Mild Pain .     • amLODIPine (NORVASC) 2.5 MG tablet Take 2.5 mg by mouth Daily.     • levothyroxine (SYNTHROID, LEVOTHROID) 75 MCG tablet Take 75 mcg by mouth Daily.     • meclizine (ANTIVERT) 12.5 MG tablet Take 12.5 mg by mouth As Needed for Dizziness.     • HYDROcodone-acetaminophen (NORCO) 5-325 MG per tablet Take 1-2 tablets by mouth Every 4 (Four) Hours As Needed (Pain). 8 tablet 0     No facility-administered medications prior to visit.        Patient Active Problem List   Diagnosis   • Malignant neoplasm of upper-outer quadrant of left breast in female, estrogen receptor positive (CMS/HCC)   • Vertigo   • History of skin cancer   • History of melanoma   • Osteoporosis   • Post-menopausal   • Dizziness   • Essential hypertension   • PAD (peripheral artery disease) (CMS/HCC)   • Claudication of both lower extremities (CMS/HCC)   • Asymptomatic bilateral carotid artery stenosis       Advanced Care Planning:  ACP discussion was held with the patient during this visit. Patient has an advance directive that is valid in EMR.     Review of  "Systems   Constitutional: Negative for activity change, appetite change and fatigue.   HENT: Negative for hearing loss and postnasal drip.    Eyes: Negative for discharge and itching.   Respiratory: Negative for cough and shortness of breath.    Cardiovascular: Negative for chest pain and leg swelling.   Gastrointestinal: Negative for abdominal distention and abdominal pain.   Endocrine: Negative for cold intolerance and heat intolerance.   Genitourinary: Negative for difficulty urinating and flank pain.   Musculoskeletal: Negative for arthralgias and myalgias.   Skin: Negative for color change.   Neurological: Negative for dizziness and facial asymmetry.   Hematological: Negative for adenopathy.   Psychiatric/Behavioral: Negative for agitation and confusion.       Compared to one year ago, the patient feels her physical health is the same.  Compared to one year ago, the patient feels her mental health is the same.    Reviewed chart for potential of high risk medication in the elderly: yes  Reviewed chart for potential of harmful drug interactions in the elderly:yes    Objective         Vitals:    02/27/20 1517 02/27/20 1539   BP: 173/70 118/70   Pulse: 71    Temp: 97.3 °F (36.3 °C)    SpO2: 97%    Weight: 63.5 kg (139 lb 14.4 oz)    Height: 165.1 cm (65\")        Body mass index is 23.28 kg/m².  Discussed the patient's BMI with her. The BMI is in the acceptable range.    Physical Exam   Constitutional: She appears well-developed and well-nourished.   HENT:   Head: Normocephalic and atraumatic.   Right Ear: External ear normal.   Left Ear: External ear normal.   Nose: Nose normal.   Mouth/Throat: Oropharynx is clear and moist.   Eyes: Pupils are equal, round, and reactive to light. Conjunctivae and EOM are normal. Right eye exhibits no discharge. Left eye exhibits no discharge. No scleral icterus.   Neck: Normal range of motion. Neck supple. No JVD present. No tracheal deviation present. No thyromegaly present. "   Cardiovascular: Normal rate, regular rhythm, normal heart sounds and intact distal pulses. Exam reveals no gallop and no friction rub.   No murmur heard.  Pulmonary/Chest: Effort normal and breath sounds normal. No respiratory distress. She has no wheezes. She has no rales. She exhibits no tenderness.   Abdominal: Soft. Bowel sounds are normal. She exhibits no distension and no mass. There is no tenderness. There is no rebound and no guarding. No hernia.   Musculoskeletal: Normal range of motion. She exhibits no edema or tenderness.   Lymphadenopathy:     She has no cervical adenopathy.   Neurological: She displays normal reflexes. No cranial nerve deficit or sensory deficit. She exhibits normal muscle tone. Coordination normal.   Skin: Skin is warm and dry.   Psychiatric: She has a normal mood and affect. Her behavior is normal. Judgment and thought content normal.   Nursing note and vitals reviewed.            Assessment/Plan   Medicare Risks and Personalized Health Plan  CMS Preventative Services Quick Reference  Fall Risk    The above risks/problems have been discussed with the patient.  Pertinent information has been shared with the patient in the After Visit Summary.  Follow up plans and orders are seen below in the Assessment/Plan Section.    Diagnoses and all orders for this visit:    1. Dizziness (Primary)  -     Vitamin B12  -     TSH  -     CT Head With & Without Contrast; Future    2. Essential hypertension  -     TSH    3. Malignant neoplasm of upper-outer quadrant of left breast in female, estrogen receptor positive (CMS/HCC)  -     CT Head With & Without Contrast; Future    Questionable etiology of dizziness. Check labs.  Check MRI of brain.  Use meclizine prn.  Has metallic stents in legs so will look at brain with CT and avoid MRI.    Continue amlodipine for BP.     Follow Up:  Return in about 6 weeks (around 4/9/2020).     An After Visit Summary and PPPS were given to the patient.     Preventive  counseling:  Pt considering pneumovax.  FLu UTD.  Counseled on regular exercise.

## 2020-02-28 ENCOUNTER — DOCUMENTATION (OUTPATIENT)
Dept: ONCOLOGY | Facility: CLINIC | Age: 85
End: 2020-02-28

## 2020-02-28 LAB
TSH SERPL DL<=0.005 MIU/L-ACNC: 2.12 UIU/ML (ref 0.27–4.2)
VIT B12 SERPL-MCNC: 448 PG/ML (ref 211–946)

## 2020-02-28 NOTE — PROGRESS NOTES
CSW contacted patient to follow up on distress score.  Patient was very short and stated she does not need anything from .  CSW encouraged patient to reach out if needs arise.

## 2020-03-03 ENCOUNTER — CONSULT (OUTPATIENT)
Dept: RADIATION ONCOLOGY | Facility: HOSPITAL | Age: 85
End: 2020-03-03

## 2020-03-03 ENCOUNTER — APPOINTMENT (OUTPATIENT)
Dept: RADIATION ONCOLOGY | Facility: HOSPITAL | Age: 85
End: 2020-03-03

## 2020-03-03 VITALS
BODY MASS INDEX: 23.26 KG/M2 | WEIGHT: 139.8 LBS | SYSTOLIC BLOOD PRESSURE: 155 MMHG | DIASTOLIC BLOOD PRESSURE: 51 MMHG | HEART RATE: 63 BPM | OXYGEN SATURATION: 99 %

## 2020-03-03 DIAGNOSIS — C50.412 MALIGNANT NEOPLASM OF UPPER-OUTER QUADRANT OF LEFT BREAST IN FEMALE, ESTROGEN RECEPTOR POSITIVE (HCC): Primary | ICD-10-CM

## 2020-03-03 DIAGNOSIS — Z17.0 MALIGNANT NEOPLASM OF UPPER-OUTER QUADRANT OF LEFT BREAST IN FEMALE, ESTROGEN RECEPTOR POSITIVE (HCC): Primary | ICD-10-CM

## 2020-03-03 PROCEDURE — 99204 OFFICE O/P NEW MOD 45 MIN: CPT | Performed by: RADIOLOGY

## 2020-03-03 PROCEDURE — G0463 HOSPITAL OUTPT CLINIC VISIT: HCPCS | Performed by: RADIOLOGY

## 2020-03-03 NOTE — PROGRESS NOTES
DIAGNOSIS and REASON FOR CONSULTATION:   Malignant neoplasm of upper-outer quadrant of left breast in female, estrogen receptor positive (CMS/HCC)  pT1c, cNX, cM0, ER+, RI+, HER2 - for advice and recommendations regarding the diagnosis    CHIEF COMPLAINT:  For advice and recommendations regarding Malignant neoplasm of upper-outer quadrant of left breast in female, estrogen receptor positive (CMS/HCC)  HISTORY OF PRESENT ILLNESS:  The patient is a 87 y.o. year old female who was found to have an abnormality on routine screening mammogram dated November 27, 2019.  This revealed a new irregular mass measuring 12 mm at the 1 o'clock position of the left breast.  Diagnostic mammogram and ultrasound on December 26, 2019 confirmed a 1.5 cm mass in the 1 o'clock position of the left breast and ultrasound showed it to be solid and measure 15 x 13 x 13 mm.  She underwent  ultrasound-guided biopsy and clip placement on January 9, 2020 and the pathology revealed an invasive ductal carcinoma with lobular features, low-grade, measuring 15 mm in greatest dimension.  There was no lymphovascular invasion noted and the tissue was positive for estrogen receptors at 95%, positive for progesterone receptors at 96% and negative for any HER-2/clayton oncogene.    She opted for breast conservation and went to surgery on February 5, 2020 and underwent a left needle localized lumpectomy and the pathology revealed an invasive lobular carcinoma measuring 2.0 x 1.4 x 1.0 cm, grade 2, without lymphovascular invasion.  The margins were all negative with the closest being 2 mm from the medial margin.  Further tissue from the medial margin was then negative.  Therefore she appears to have a pathologic T1c NX M0 grade 2 invasive lobular carcinoma of the left breast.    She has done well postoperatively and has already seen the [facility] physicians for medical oncology opinion.  They discussed the use of Arimidex and a bone density scan. She returns there in 8  weeks and I was asked to see the patient at the request of the referring provider noted above for advice and recommendations regarding this diagnosis.     Clinically, she is doing well. She has no complaints regarding the breast and her ROS is negative other than intermittent headaches which are not new for her.    Performance Status: (1) Restricted in physically strenuous activity, ambulatory and able to do work of light nature  Objective   Past Medical History: she  has a past medical history of Anxiety, Arthritis, Cancer (CMS/[facility]) (01/2020), Coronary artery disease, History of headaches, Hypertension, Hypothyroidism, Melanoma (CMS/[facility]) (2017), Skin cancer (2015), Skin cancer (2008), and Vertigo.    Past Surgical History:  she has a past surgical history that includes Skin graft (Bilateral); Leg Surgery (Left, 2017); Skin biopsy (2008); Skin cancer excision (Bilateral, 2015); Breast surgery (Left, 2020); Colonoscopy; Cosmetic surgery; Esophagogastroduodenoscopy; Eye surgery (Bilateral); Vascular surgery (Bilateral, 2018); and breast lumpectomy with sentinel node biopsy (Left, 2/5/2020).    Meds:    Current Outpatient Medications:   •  acetaminophen (TYLENOL) 500 MG tablet, Take 1,000 mg by mouth Every 6 (Six) Hours As Needed for Mild Pain ., Disp: , Rfl:   •  amLODIPine (NORVASC) 2.5 MG tablet, Take 2.5 mg by mouth Daily., Disp: , Rfl:   •  levothyroxine (SYNTHROID, LEVOTHROID) 75 MCG tablet, Take 75 mcg by mouth Daily., Disp: , Rfl:   •  meclizine (ANTIVERT) 12.5 MG tablet, Take 12.5 mg by mouth As Needed for Dizziness., Disp: , Rfl:     Allergies:    Allergies   Allergen Reactions   • Codeine Mental Status Change       Family History:  her family history includes Breast cancer in her maternal grandmother; Breast cancer (age of onset: 46) in her sister; Colon cancer in her maternal grandmother; Esophageal cancer in her brother; Heart disease in her brother, father, and sister; Heart failure in her father;  Leukemia in her daughter; Skin cancer in her father.    Social History:  she  reports that she quit smoking about 50 years ago. She has never used smokeless tobacco. She reports that she does not drink alcohol or use drugs.    Pertinent Findings on   Review of Systems   Constitutional: Negative for appetite change, chills, diaphoresis, fatigue, fever and unexpected weight change.   HENT:   Negative for hearing loss, lump/mass, mouth sores, nosebleeds, sore throat, tinnitus, trouble swallowing and voice change.    Eyes: Negative for eye problems and icterus.   Respiratory: Negative for chest tightness, cough, hemoptysis, shortness of breath and wheezing.    Cardiovascular: Negative for chest pain, leg swelling and palpitations.   Gastrointestinal: Negative for abdominal distention, abdominal pain, blood in stool, constipation, diarrhea, nausea, rectal pain and vomiting.   Endocrine: Negative for hot flashes.   Genitourinary: Negative for bladder incontinence, difficulty urinating, dyspareunia, dysuria, frequency, hematuria, menstrual problem, nocturia, pelvic pain, vaginal bleeding and vaginal discharge.    Musculoskeletal: Negative for arthralgias, back pain, flank pain, gait problem, myalgias, neck pain and neck stiffness.   Skin: Negative for itching, rash and wound.   Neurological: Positive for dizziness, headaches and light-headedness. Negative for extremity weakness, gait problem, numbness, seizures and speech difficulty.   Hematological: Negative for adenopathy. Does not bruise/bleed easily.   Psychiatric/Behavioral: Negative for confusion, decreased concentration, depression, sleep disturbance and suicidal ideas. The patient is not nervous/anxious.    :     Subjective   Vitals:    03/03/20 0939   BP: 155/51   Pulse: 63   SpO2: 99%   Weight: 63.4 kg (139 lb 12.8 oz)   PainSc:   6   PainLoc: Head       Pertinent Findings on:  Physical Exam   Constitutional: She appears well-developed and well-nourished. No  distress.   HENT:   Head: Normocephalic.   Neck: Normal range of motion. Neck supple.   Pulmonary/Chest: She exhibits no mass and no tenderness. Right breast exhibits no inverted nipple, no mass, no nipple discharge, no skin change and no tenderness. Left breast exhibits no inverted nipple, no mass, no nipple discharge, no skin change and no tenderness.   Right breast is without abnormality as is the right axilla. The left breast shows a healing lumpectomy incision over the lateral central aspect of the breast. There is no skin or nipple abnormality, no warmth or erythema.  She does have a palpable fluid collection underlying the incision but without warmth.   Musculoskeletal: Normal range of motion.   Lymphadenopathy:     She has no cervical adenopathy.     She has no axillary adenopathy.        Right axillary: No pectoral adenopathy present.        Left axillary: No pectoral adenopathy present.       Right: No supraclavicular adenopathy present.        Left: No supraclavicular adenopathy present.   Axillary incision is healing well. There is no palpable axillary, supraclavicular nor cervical lymphadenopathy appreciated. No lymphedema is noted in either upper extremity.   Skin: Skin is intact. She is not diaphoretic.   Psychiatric: She has a normal mood and affect. Her speech is normal and behavior is normal. Judgment and thought content normal. Cognition and memory are normal.          Assessment: Malignant neoplasm of upper-outer quadrant of left breast in female, estrogen receptor positive (CMS/HCC)    This assessment comes from my review of the imaging, pathology, physician notes and other pertinent information as mentioned.    Plan:   We reviewed the specifics of her clinical, imaging and pathology findings today and also talked through the role of radiation therapy in her breast conservation treatment. Given her age and pathology, we discussed the very reasonable option of observation and also discussed the  possible Arimidex use as well.     We talked thru a course of postoperative whole breast radiation therapy consisting of 4256 cGy aimed at the breast given over 16 treatments. We would then plan on boosting the tumor bed region as I will delineate it on her treatment planning scan with an additional 1000 cGy given in five treatments. The above course of treatment should be completed in 4 1/2 weeks.    We discussed the specifics, logistics and side effects of this course of treatment  which may involve acutely, irritation of the skin, including erythema and possibly moist desquamation, tenderness of the musculature of the chest wall and mild fatigue. We discussed the long term possibility of mild hyperpigmentation and fibrosis of the breast, mild increased incidence of rib fracture and radiation pneumonitis.  We also discussed the importance of our treatment planning process in protecting these underlying structures as much as possible, specifically heart, ribs and lung and I believe all her questions were answered to her satisfaction.      She wishes to consider further if she wishes to pursue treatment which is reasonable. I encouraged her to call if she had further questions or if she wished to pursue treatment and gave her my card.      Objective   I spent greater than 45 minutes in face-to-face time with the patient and 30 minutes of that time were spent in counseling and coordination of care, including review of imaging and pathology; indications, goals, logistics, alternatives and risks - both common and rare - for my recommendations as well as surveillance and potential outcomes.

## 2020-03-05 ENCOUNTER — HOSPITAL ENCOUNTER (OUTPATIENT)
Dept: BONE DENSITY | Facility: HOSPITAL | Age: 85
Discharge: HOME | End: 2020-03-05
Admitting: INTERNAL MEDICINE

## 2020-03-05 DIAGNOSIS — M81.0 OSTEOPOROSIS, UNSPECIFIED OSTEOPOROSIS TYPE, UNSPECIFIED PATHOLOGICAL FRACTURE PRESENCE: ICD-10-CM

## 2020-03-05 DIAGNOSIS — Z78.0 POST-MENOPAUSAL: ICD-10-CM

## 2020-03-05 PROCEDURE — 77080 DXA BONE DENSITY AXIAL: CPT

## 2020-03-17 ENCOUNTER — HOSPITAL ENCOUNTER (OUTPATIENT)
Dept: CT IMAGING | Facility: HOSPITAL | Age: 85
Discharge: HOME | End: 2020-03-17
Admitting: FAMILY MEDICINE

## 2020-03-17 DIAGNOSIS — R42 DIZZINESS: ICD-10-CM

## 2020-03-17 DIAGNOSIS — C50.412 MALIGNANT NEOPLASM OF UPPER-OUTER QUADRANT OF LEFT BREAST IN FEMALE, ESTROGEN RECEPTOR POSITIVE (HCC): ICD-10-CM

## 2020-03-17 DIAGNOSIS — Z17.0 MALIGNANT NEOPLASM OF UPPER-OUTER QUADRANT OF LEFT BREAST IN FEMALE, ESTROGEN RECEPTOR POSITIVE (HCC): ICD-10-CM

## 2020-03-17 LAB — CREAT BLDA-MCNC: 1 MG/DL (ref 0.6–1.3)

## 2020-03-17 PROCEDURE — 70470 CT HEAD/BRAIN W/O & W/DYE: CPT

## 2020-03-17 PROCEDURE — 0 IOPAMIDOL PER 1 ML: Performed by: FAMILY MEDICINE

## 2020-03-17 PROCEDURE — 82565 ASSAY OF CREATININE: CPT

## 2020-03-17 RX ADMIN — IOPAMIDOL 50 ML: 755 INJECTION, SOLUTION INTRAVENOUS at 14:49

## 2020-03-24 ENCOUNTER — APPOINTMENT (OUTPATIENT)
Dept: OTHER | Facility: HOSPITAL | Age: 85
End: 2020-03-24

## 2020-04-16 ENCOUNTER — APPOINTMENT (OUTPATIENT)
Dept: RADIATION ONCOLOGY | Facility: HOSPITAL | Age: 85
End: 2020-04-16

## 2020-04-16 ENCOUNTER — OFFICE VISIT (OUTPATIENT)
Dept: RADIATION ONCOLOGY | Facility: HOSPITAL | Age: 85
End: 2020-04-16

## 2020-04-16 DIAGNOSIS — C50.412 MALIGNANT NEOPLASM OF UPPER-OUTER QUADRANT OF LEFT BREAST IN FEMALE, ESTROGEN RECEPTOR POSITIVE (HCC): Primary | ICD-10-CM

## 2020-04-16 DIAGNOSIS — Z17.0 MALIGNANT NEOPLASM OF UPPER-OUTER QUADRANT OF LEFT BREAST IN FEMALE, ESTROGEN RECEPTOR POSITIVE (HCC): Primary | ICD-10-CM

## 2020-04-16 PROCEDURE — 99214 OFFICE O/P EST MOD 30 MIN: CPT | Performed by: RADIOLOGY

## 2020-04-16 PROCEDURE — 77290 THER RAD SIMULAJ FIELD CPLX: CPT | Performed by: RADIOLOGY

## 2020-04-16 PROCEDURE — 77333 RADIATION TREATMENT AID(S): CPT | Performed by: RADIOLOGY

## 2020-04-16 PROCEDURE — 77263 THER RADIOLOGY TX PLNG CPLX: CPT | Performed by: RADIOLOGY

## 2020-04-16 NOTE — PROGRESS NOTES
DIAGNOSIS and REASON FOR SECOND VISIT:   Malignant neoplasm of upper-outer quadrant of left breast in female, estrogen receptor positive (CMS/HCC)  pT1c, cNX, cM0, ER+, VA+, HER2     CHIEF COMPLAINT:  For further recommendations regarding Malignant neoplasm of upper-outer quadrant of left breast in female, estrogen receptor positive (CMS/HCC)  HISTORY OF PRESENT ILLNESS:  The patient is a 87 y.o. year old female who I initially saw in early March to discuss postop radiation for the above diagnosis. She was leaning toward observation at that point. She has now decided she does not want to take the anti-hormonal therapy and wants to pursue the radiation.    In short, she was found to have an abnormality on routine screening mammogram dated November 27, 2019.  This revealed a new irregular mass measuring 12 mm at the 1 o'clock position of the left breast.  Diagnostic mammogram and ultrasound on December 26, 2019 confirmed a 1.5 cm mass in the 1 o'clock position of the left breast and ultrasound showed it to be solid and measure 15 x 13 x 13 mm.  She underwent  ultrasound-guided biopsy and clip placement on January 9, 2020 and the pathology revealed an invasive ductal carcinoma with lobular features, low-grade, measuring 15 mm in greatest dimension.  There was no lymphovascular invasion noted and the tissue was positive for estrogen receptors at 95%, positive for progesterone receptors at 96% and negative for any HER-2/clayton oncogene.    She opted for breast conservation and went to surgery on February 5, 2020 and underwent a left needle localized lumpectomy and the pathology revealed an invasive lobular carcinoma measuring 2.0 x 1.4 x 1.0 cm, grade 2, without lymphovascular invasion.  The margins were all negative with the closest being 2 mm from the medial margin.  Further tissue from the medial margin was then negative.  Therefore she appears to have a pathologic T1c NX M0 grade 2 invasive lobular carcinoma of the left  breast.    She is doing well. She has no complaints regarding the breast and her ROS is negative.     Performance Status: (1) Restricted in physically strenuous activity, ambulatory and able to do work of light nature  Objective   Past Medical History: she  has a past medical history of Anxiety, Arthritis, Cancer (CMS/HCC) (01/2020), Coronary artery disease, Hemorrhoids, History of basal cell cancer, History of headaches, Hypertension, Hypothyroidism, Melanoma (CMS/HCC) (2017), PAD (peripheral artery disease) (CMS/[facility]), Skin cancer (2015), Skin cancer (2008), and Vertigo.    Past Surgical History:  she has a past surgical history that includes Skin graft (Bilateral); Leg Surgery (Left, 2017); Skin biopsy (2008); Skin cancer excision (Bilateral, 2015); Breast surgery (Left, 2020); Colonoscopy; Cosmetic surgery; Esophagogastroduodenoscopy; Eye surgery (Bilateral); Vascular surgery (Bilateral, 2018); and breast lumpectomy with sentinel node biopsy (Left, 2/5/2020).    Meds:    Current Outpatient Medications:   •  acetaminophen (TYLENOL) 500 MG tablet, Take 1,000 mg by mouth Every 6 (Six) Hours As Needed for Mild Pain ., Disp: , Rfl:   •  amLODIPine (NORVASC) 2.5 MG tablet, Take 2.5 mg by mouth Daily., Disp: , Rfl:   •  levothyroxine (SYNTHROID, LEVOTHROID) 75 MCG tablet, Take 75 mcg by mouth Daily., Disp: , Rfl:   •  meclizine (ANTIVERT) 12.5 MG tablet, Take 12.5 mg by mouth As Needed for Dizziness., Disp: , Rfl:     Allergies:    Allergies   Allergen Reactions   • Codeine Mental Status Change       Family History:  her family history includes Breast cancer in her maternal grandmother; Breast cancer (age of onset: 46) in her sister; Colon cancer in her maternal grandmother; Esophageal cancer in her brother; Heart disease in her brother, father, and sister; Heart failure in her father; Leukemia in her daughter; Skin cancer in her father.    Social History:  she  reports that she quit smoking about 50 years ago. She has  never used smokeless tobacco. She reports that she does not drink alcohol or use drugs.    Pertinent Findings on   Review of Systems   Constitutional: Negative for appetite change, chills, diaphoresis, fatigue, fever and unexpected weight change.   HENT:   Negative for hearing loss, lump/mass, mouth sores, nosebleeds, sore throat, tinnitus, trouble swallowing and voice change.    Eyes: Negative for eye problems and icterus.   Respiratory: Negative for chest tightness, cough, hemoptysis, shortness of breath and wheezing.    Cardiovascular: Negative for chest pain, leg swelling and palpitations.   Gastrointestinal: Negative for abdominal distention, abdominal pain, blood in stool, constipation, diarrhea, nausea, rectal pain and vomiting.   Endocrine: Negative for hot flashes.   Genitourinary: Negative for bladder incontinence, difficulty urinating, dyspareunia, dysuria, frequency, hematuria, menstrual problem, nocturia, pelvic pain, vaginal bleeding and vaginal discharge.    Musculoskeletal: Negative for arthralgias, back pain, flank pain, gait problem, myalgias, neck pain and neck stiffness.   Skin: Negative for itching, rash and wound.   Neurological: Positive for dizziness, headaches and light-headedness. Negative for extremity weakness, gait problem, numbness, seizures and speech difficulty.   Hematological: Negative for adenopathy. Does not bruise/bleed easily.   Psychiatric/Behavioral: Negative for confusion, decreased concentration, depression, sleep disturbance and suicidal ideas. The patient is not nervous/anxious.    :     Subjective   There were no vitals filed for this visit.    Pertinent Findings on:  Physical Exam   Constitutional: She appears well-developed and well-nourished. No distress.   HENT:   Head: Normocephalic.   Neck: Normal range of motion. Neck supple.   Pulmonary/Chest: She exhibits no mass and no tenderness. Right breast exhibits no inverted nipple, no mass, no nipple discharge, no skin  change and no tenderness. Left breast exhibits no inverted nipple, no mass, no nipple discharge, no skin change and no tenderness.   Right breast is without abnormality as is the right axilla. The left breast shows a healed lumpectomy incision over the lateral central aspect of the breast. She does still have a small palpable fluid collection underlying the incision but without warmth.   Musculoskeletal: Normal range of motion.   Lymphadenopathy:     She has no cervical adenopathy.     She has no axillary adenopathy.        Right axillary: No pectoral adenopathy present.        Left axillary: No pectoral adenopathy present.       Right: No supraclavicular adenopathy present.        Left: No supraclavicular adenopathy present.   Axillary incision is healing well. There is no palpable axillary, supraclavicular nor cervical lymphadenopathy appreciated. No lymphedema is noted in either upper extremity.   Skin: Skin is intact. She is not diaphoretic.   Psychiatric: She has a normal mood and affect. Her speech is normal and behavior is normal. Judgment and thought content normal. Cognition and memory are normal.          Assessment: Malignant neoplasm of upper-outer quadrant of left breast in female, estrogen receptor positive (CMS/HCC)    This assessment comes from my review of the imaging, pathology, physician notes and other pertinent information as mentioned.    Plan:   We reviewed the course of postoperative whole breast radiation therapy consisting of 4256 cGy aimed at the breast given over 16 treatments. We would then plan on boosting the tumor bed region as I will delineate it on her treatment planning scan with an additional 1000 cGy given in five treatments. The above course of treatment should be completed in 4 1/2 weeks.    We reviewed the acute and long term side effects again as well and questions were answered. Given the current covid-19 concern, I discussed again no further treatment or waiting for the  "surge to pass but the patient states she \"is not concerned\" about that risk and wants to \"get this started as soon as possible.\"     Given that, we started our treatment planning process today and I did review again with her the covid-19 precautions. She was masked and alone today. We should be ready to get her treatments underway on Monday, April 20th.    Objective   I spent greater than 25 minutes in face-to-face time with the patient and 20 minutes of that time were spent in counseling and coordination of care, including review of imaging and pathology; indications, goals, logistics, alternatives and risks - both common and rare - for my recommendations as well as surveillance and potential outcomes.            "

## 2020-04-20 PROCEDURE — 77412 RADIATION TX DELIVERY LVL 3: CPT | Performed by: RADIOLOGY

## 2020-04-20 PROCEDURE — 77300 RADIATION THERAPY DOSE PLAN: CPT | Performed by: RADIOLOGY

## 2020-04-20 PROCEDURE — 77295 3-D RADIOTHERAPY PLAN: CPT | Performed by: RADIOLOGY

## 2020-04-20 PROCEDURE — 77334 RADIATION TREATMENT AID(S): CPT | Performed by: RADIOLOGY

## 2020-04-20 PROCEDURE — 77427 RADIATION TX MANAGEMENT X5: CPT | Performed by: RADIOLOGY

## 2020-04-20 PROCEDURE — 77387 GUIDANCE FOR RADJ TX DLVR: CPT | Performed by: RADIOLOGY

## 2020-04-21 PROCEDURE — 77387 GUIDANCE FOR RADJ TX DLVR: CPT | Performed by: RADIOLOGY

## 2020-04-21 PROCEDURE — 77412 RADIATION TX DELIVERY LVL 3: CPT | Performed by: RADIOLOGY

## 2020-04-21 PROCEDURE — 77336 RADIATION PHYSICS CONSULT: CPT | Performed by: RADIOLOGY

## 2020-04-22 ENCOUNTER — RADIATION ONCOLOGY WEEKLY ASSESSMENT (OUTPATIENT)
Dept: RADIATION ONCOLOGY | Facility: HOSPITAL | Age: 85
End: 2020-04-22

## 2020-04-22 DIAGNOSIS — C50.412 MALIGNANT NEOPLASM OF UPPER-OUTER QUADRANT OF LEFT BREAST IN FEMALE, ESTROGEN RECEPTOR POSITIVE (HCC): Primary | ICD-10-CM

## 2020-04-22 DIAGNOSIS — Z17.0 MALIGNANT NEOPLASM OF UPPER-OUTER QUADRANT OF LEFT BREAST IN FEMALE, ESTROGEN RECEPTOR POSITIVE (HCC): Primary | ICD-10-CM

## 2020-04-22 PROCEDURE — 77387 GUIDANCE FOR RADJ TX DLVR: CPT | Performed by: RADIOLOGY

## 2020-04-22 PROCEDURE — 77412 RADIATION TX DELIVERY LVL 3: CPT | Performed by: RADIOLOGY

## 2020-04-22 NOTE — PROGRESS NOTES
Physician Weekly Management Note    Diagnosis:     1. Malignant neoplasm of upper-outer quadrant of left breast in female, estrogen receptor positive (CMS/HCC)     Stage Unknown (cT1c, cNX, cM0, ER+, AR+, HER2-)    Reason for Visit: Radiation (3/21)    Concurrent Chemo:   No    Notes on Treatment course, Films, Medical progress and Plan:  Doing well. Skin fine. Asked about fatigue, discussed. No problems or questions, cont on.    Performance Status: (1) Restricted in physically strenuous activity, ambulatory and able to do work of light nature  Subjective   ROS - Other than as listed above, as follow:  Constitutional - Normal - Denies lack of appetite, fatigue, fever, night sweats and change in weight.  Neck - Normal - Denies neck masses, muscle weakness, neck pain, decreased range of motion and swelling of the neck.  Breasts - Normal - Denies breast masses, nipple discharge, nipple inversion and pain.  Respiratory - Normal - Denies cough, dyspnea, hemoptysis, hiccoughs, pleuritic chest pain and wheezing.  Gastrointestinal - Normal - Denies abdominal pain, constipation, diarrhea, heartburn / dyspepsia, hematemesis, hemorrhoids, melena / GI bleeding, nausea, pain / cramping, satiety and vomiting.  Musculoskeletal - Normal - Denies arthritis, bone pain, joint pain, muscle weakness and decreased range of motion.   Hematologic/Lymphatic - Normal - Denies easy bruising and tender or enlarged lymph nodes.  There were no vitals filed for this visit.  Objective   PYHSICAL EXAM - Other than listed above, as follows:  Constitutional - Normal - No evidence of impaired alertness, inadequate appearance, premature or advanced chronologic age, uncooperativeness, altered mood and affect and disorientation.  Neck - Normal - No evidence of tender or enlarged lymph nodes, neck abnormalities, restricted range of motion and enlarged thyroid gland.  Breasts - Normal - No change in nipple or incision site.  Chest - Normal - No evidence of  "chest abnormalities and tender or enlarged lymph nodes.  Hematologic/Lymphatic -  Normal - No evidence of tender or enlarged axillae lymph nodes and tender or enlarged neck lymph nodes.     Technical aspects reviewed:  Weekly OBI approved if applicable? Yes  Weekly port films approved?   Yes  Change requests noted if applicable? Yes  Patient setup and plan reviewed?  Yes    Chart Reviewed:  Continue current treatment plan?   Yes  Treatment plan change requested?  No    I have reviewed and marked as \"reviewed\" the current medications, allergies and problem list in the patients EMR.  I have reviewed the patient's medical, surgical  history in detail, reviewed any pertinent lab work  and updated the computerized patient record if needed.    Patient's Care Team:  Patient Care Team:  Yuniel Bonilla MD as PCP - General  Yuniel Bonilla MD as PCP - Family Medicine  Self, Khoa Quick MD (Vascular Surgery)  Sima Joiner MD as Consulting Physician (Radiation Oncology)  Sowmya Faulkner MD as Consulting Physician (Hematology and Oncology)  Kush Plasencia MD as Referring Physician (Breast Surgery)      "

## 2020-04-23 PROCEDURE — 77387 GUIDANCE FOR RADJ TX DLVR: CPT | Performed by: RADIOLOGY

## 2020-04-23 PROCEDURE — 77412 RADIATION TX DELIVERY LVL 3: CPT | Performed by: RADIOLOGY

## 2020-04-24 PROCEDURE — 77412 RADIATION TX DELIVERY LVL 3: CPT | Performed by: RADIOLOGY

## 2020-04-24 PROCEDURE — 77387 GUIDANCE FOR RADJ TX DLVR: CPT | Performed by: RADIOLOGY

## 2020-04-27 PROCEDURE — 77387 GUIDANCE FOR RADJ TX DLVR: CPT | Performed by: RADIOLOGY

## 2020-04-27 PROCEDURE — 77412 RADIATION TX DELIVERY LVL 3: CPT | Performed by: RADIOLOGY

## 2020-04-27 PROCEDURE — 77427 RADIATION TX MANAGEMENT X5: CPT | Performed by: RADIOLOGY

## 2020-04-28 PROCEDURE — 77336 RADIATION PHYSICS CONSULT: CPT | Performed by: RADIOLOGY

## 2020-04-28 PROCEDURE — 77387 GUIDANCE FOR RADJ TX DLVR: CPT | Performed by: RADIOLOGY

## 2020-04-28 PROCEDURE — 77412 RADIATION TX DELIVERY LVL 3: CPT | Performed by: RADIOLOGY

## 2020-04-29 ENCOUNTER — RADIATION ONCOLOGY WEEKLY ASSESSMENT (OUTPATIENT)
Dept: RADIATION ONCOLOGY | Facility: HOSPITAL | Age: 85
End: 2020-04-29

## 2020-04-29 DIAGNOSIS — Z17.0 MALIGNANT NEOPLASM OF UPPER-OUTER QUADRANT OF LEFT BREAST IN FEMALE, ESTROGEN RECEPTOR POSITIVE (HCC): Primary | ICD-10-CM

## 2020-04-29 DIAGNOSIS — C50.412 MALIGNANT NEOPLASM OF UPPER-OUTER QUADRANT OF LEFT BREAST IN FEMALE, ESTROGEN RECEPTOR POSITIVE (HCC): Primary | ICD-10-CM

## 2020-04-29 PROCEDURE — 77387 GUIDANCE FOR RADJ TX DLVR: CPT | Performed by: RADIOLOGY

## 2020-04-29 PROCEDURE — 77412 RADIATION TX DELIVERY LVL 3: CPT | Performed by: RADIOLOGY

## 2020-04-29 NOTE — PROGRESS NOTES
Physician Weekly Management Note    Diagnosis:     1. Malignant neoplasm of upper-outer quadrant of left breast in female, estrogen receptor positive (CMS/HCC)     Stage Unknown (cT1c, cNX, cM0, ER+, [place]+, HER2-)    Reason for Visit: Radiation (8/21)    Concurrent Chemo:   No    Notes on Treatment course, Films, Medical progress and Plan:  Doing well. Skin fine. Stll feels fatigued but managing well. No problems or questions, cont on.    Performance Status: (1) Restricted in physically strenuous activity, ambulatory and able to do work of light nature  Subjective   ROS - Other than as listed above, as follow:  Constitutional - Normal - Denies lack of appetite, fatigue, fever, night sweats and change in weight.  Neck - Normal - Denies neck masses, muscle weakness, neck pain, decreased range of motion and swelling of the neck.  Breasts - Normal - Denies breast masses, nipple discharge, nipple inversion and pain.  Respiratory - Normal - Denies cough, dyspnea, hemoptysis, hiccoughs, pleuritic chest pain and wheezing.  Gastrointestinal - Normal - Denies abdominal pain, constipation, diarrhea, heartburn / dyspepsia, hematemesis, hemorrhoids, melena / GI bleeding, nausea, pain / cramping, satiety and vomiting.  Musculoskeletal - Normal - Denies arthritis, bone pain, joint pain, muscle weakness and decreased range of motion.   Hematologic/Lymphatic - Normal - Denies easy bruising and tender or enlarged lymph nodes.  There were no vitals filed for this visit.  Objective   PYHSICAL EXAM - Other than listed above, as follows:  Constitutional - Normal - No evidence of impaired alertness, inadequate appearance, premature or advanced chronologic age, uncooperativeness, altered mood and affect and disorientation.  Neck - Normal - No evidence of tender or enlarged lymph nodes, neck abnormalities, restricted range of motion and enlarged thyroid gland.  Breasts - Normal - No change in nipple or incision site.  Chest - Normal - No  "evidence of chest abnormalities and tender or enlarged lymph nodes.  Hematologic/Lymphatic -  Normal - No evidence of tender or enlarged axillae lymph nodes and tender or enlarged neck lymph nodes.     Technical aspects reviewed:  Weekly OBI approved if applicable? Yes  Weekly port films approved?   Yes  Change requests noted if applicable? Yes  Patient setup and plan reviewed?  Yes    Chart Reviewed:  Continue current treatment plan?   Yes  Treatment plan change requested?  No    I have reviewed and marked as \"reviewed\" the current medications, allergies and problem list in the patients EMR.  I have reviewed the patient's medical, surgical  history in detail, reviewed any pertinent lab work  and updated the computerized patient record if needed.    Patient's Care Team:  Patient Care Team:  Yuniel Bonilla MD as PCP - General  Yuniel Bonilla MD as PCP - Family Medicine  Self, Khoa Quick MD (Vascular Surgery)  Sima Joiner MD as Consulting Physician (Radiation Oncology)  Sowmya Faulkner MD as Consulting Physician (Hematology and Oncology)  Kush Plasencia MD as Referring Physician (Breast Surgery)      "

## 2020-04-30 PROCEDURE — 77412 RADIATION TX DELIVERY LVL 3: CPT | Performed by: RADIOLOGY

## 2020-04-30 PROCEDURE — 77387 GUIDANCE FOR RADJ TX DLVR: CPT | Performed by: RADIOLOGY

## 2020-05-01 ENCOUNTER — APPOINTMENT (OUTPATIENT)
Dept: RADIATION ONCOLOGY | Facility: HOSPITAL | Age: 85
End: 2020-05-01

## 2020-05-01 PROCEDURE — 77412 RADIATION TX DELIVERY LVL 3: CPT | Performed by: RADIOLOGY

## 2020-05-01 PROCEDURE — 77387 GUIDANCE FOR RADJ TX DLVR: CPT | Performed by: RADIOLOGY

## 2020-05-04 PROCEDURE — 77412 RADIATION TX DELIVERY LVL 3: CPT | Performed by: RADIOLOGY

## 2020-05-04 PROCEDURE — 77427 RADIATION TX MANAGEMENT X5: CPT | Performed by: RADIOLOGY

## 2020-05-04 PROCEDURE — 77387 GUIDANCE FOR RADJ TX DLVR: CPT | Performed by: RADIOLOGY

## 2020-05-05 ENCOUNTER — RADIATION ONCOLOGY WEEKLY ASSESSMENT (OUTPATIENT)
Dept: RADIATION ONCOLOGY | Facility: HOSPITAL | Age: 85
End: 2020-05-05

## 2020-05-05 DIAGNOSIS — Z17.0 MALIGNANT NEOPLASM OF UPPER-OUTER QUADRANT OF LEFT BREAST IN FEMALE, ESTROGEN RECEPTOR POSITIVE (HCC): Primary | ICD-10-CM

## 2020-05-05 DIAGNOSIS — C50.412 MALIGNANT NEOPLASM OF UPPER-OUTER QUADRANT OF LEFT BREAST IN FEMALE, ESTROGEN RECEPTOR POSITIVE (HCC): Primary | ICD-10-CM

## 2020-05-05 PROCEDURE — 77387 GUIDANCE FOR RADJ TX DLVR: CPT | Performed by: RADIOLOGY

## 2020-05-05 PROCEDURE — 77412 RADIATION TX DELIVERY LVL 3: CPT | Performed by: RADIOLOGY

## 2020-05-05 PROCEDURE — 77336 RADIATION PHYSICS CONSULT: CPT | Performed by: RADIOLOGY

## 2020-05-05 NOTE — PROGRESS NOTES
Physician Weekly Management Note    Diagnosis:     1. Malignant neoplasm of upper-outer quadrant of left breast in female, estrogen receptor positive (CMS/HCC)     Stage Unknown (cT1c, cNX, cM0, ER+, [place]+, HER2-)    Reason for Visit: Radiation (12/21)    Concurrent Chemo:   No    Notes on Treatment course, Films, Medical progress and Plan:  Doing well. Skin fine. Stll feels fatigued but managing well. No problems or questions, cont on.    Performance Status: (1) Restricted in physically strenuous activity, ambulatory and able to do work of light nature  Subjective   ROS - Other than as listed above, as follow:  Constitutional - Normal - Denies lack of appetite, fatigue, fever, night sweats and change in weight.  Neck - Normal - Denies neck masses, muscle weakness, neck pain, decreased range of motion and swelling of the neck.  Breasts - Normal - Denies breast masses, nipple discharge, nipple inversion and pain.  Respiratory - Normal - Denies cough, dyspnea, hemoptysis, hiccoughs, pleuritic chest pain and wheezing.  Gastrointestinal - Normal - Denies abdominal pain, constipation, diarrhea, heartburn / dyspepsia, hematemesis, hemorrhoids, melena / GI bleeding, nausea, pain / cramping, satiety and vomiting.  Musculoskeletal - Normal - Denies arthritis, bone pain, joint pain, muscle weakness and decreased range of motion.   Hematologic/Lymphatic - Normal - Denies easy bruising and tender or enlarged lymph nodes.  There were no vitals filed for this visit.  Objective   PYHSICAL EXAM - Other than listed above, as follows:  Constitutional - Normal - No evidence of impaired alertness, inadequate appearance, premature or advanced chronologic age, uncooperativeness, altered mood and affect and disorientation.  Neck - Normal - No evidence of tender or enlarged lymph nodes, neck abnormalities, restricted range of motion and enlarged thyroid gland.  Breasts - Normal - No change in nipple or incision site.  Chest - Normal - No  "evidence of chest abnormalities and tender or enlarged lymph nodes.  Hematologic/Lymphatic -  Normal - No evidence of tender or enlarged axillae lymph nodes and tender or enlarged neck lymph nodes.     Technical aspects reviewed:  Weekly OBI approved if applicable? Yes  Weekly port films approved?   Yes  Change requests noted if applicable? Yes  Patient setup and plan reviewed?  Yes    Chart Reviewed:  Continue current treatment plan?   Yes  Treatment plan change requested?  No    I have reviewed and marked as \"reviewed\" the current medications, allergies and problem list in the patients EMR.  I have reviewed the patient's medical, surgical  history in detail, reviewed any pertinent lab work  and updated the computerized patient record if needed.    Patient's Care Team:  Patient Care Team:  Yuniel Bonilla MD as PCP - General  Yuniel Bonilla MD as PCP - Family Medicine  Self, Khoa Quick MD (Vascular Surgery)  Sima Joiner MD as Consulting Physician (Radiation Oncology)  Sowmya Faulkner MD as Consulting Physician (Hematology and Oncology)  Kush Plasencia MD as Referring Physician (Breast Surgery)      "

## 2020-05-06 PROCEDURE — 77387 GUIDANCE FOR RADJ TX DLVR: CPT | Performed by: RADIOLOGY

## 2020-05-06 PROCEDURE — 77412 RADIATION TX DELIVERY LVL 3: CPT | Performed by: RADIOLOGY

## 2020-05-07 PROCEDURE — 77387 GUIDANCE FOR RADJ TX DLVR: CPT | Performed by: RADIOLOGY

## 2020-05-07 PROCEDURE — 77300 RADIATION THERAPY DOSE PLAN: CPT | Performed by: RADIOLOGY

## 2020-05-07 PROCEDURE — 77412 RADIATION TX DELIVERY LVL 3: CPT | Performed by: RADIOLOGY

## 2020-05-08 PROCEDURE — 77387 GUIDANCE FOR RADJ TX DLVR: CPT | Performed by: RADIOLOGY

## 2020-05-08 PROCEDURE — 77412 RADIATION TX DELIVERY LVL 3: CPT | Performed by: RADIOLOGY

## 2020-05-11 PROCEDURE — 77412 RADIATION TX DELIVERY LVL 3: CPT | Performed by: RADIOLOGY

## 2020-05-11 PROCEDURE — 77427 RADIATION TX MANAGEMENT X5: CPT | Performed by: RADIOLOGY

## 2020-05-11 PROCEDURE — 77387 GUIDANCE FOR RADJ TX DLVR: CPT | Performed by: RADIOLOGY

## 2020-05-12 ENCOUNTER — OFFICE VISIT (OUTPATIENT)
Dept: MAMMOGRAPHY | Facility: CLINIC | Age: 85
End: 2020-05-12

## 2020-05-12 ENCOUNTER — APPOINTMENT (OUTPATIENT)
Dept: LAB | Facility: HOSPITAL | Age: 85
End: 2020-05-12

## 2020-05-12 ENCOUNTER — RADIATION ONCOLOGY WEEKLY ASSESSMENT (OUTPATIENT)
Dept: RADIATION ONCOLOGY | Facility: HOSPITAL | Age: 85
End: 2020-05-12

## 2020-05-12 ENCOUNTER — OFFICE VISIT (OUTPATIENT)
Dept: ONCOLOGY | Facility: CLINIC | Age: 85
End: 2020-05-12

## 2020-05-12 VITALS — DIASTOLIC BLOOD PRESSURE: 80 MMHG | SYSTOLIC BLOOD PRESSURE: 138 MMHG

## 2020-05-12 DIAGNOSIS — C50.412 MALIGNANT NEOPLASM OF UPPER-OUTER QUADRANT OF LEFT BREAST IN FEMALE, ESTROGEN RECEPTOR POSITIVE (HCC): Primary | ICD-10-CM

## 2020-05-12 DIAGNOSIS — M85.80 OSTEOPENIA, UNSPECIFIED LOCATION: ICD-10-CM

## 2020-05-12 DIAGNOSIS — Z17.0 MALIGNANT NEOPLASM OF BREAST IN FEMALE, ESTROGEN RECEPTOR POSITIVE, UNSPECIFIED LATERALITY, UNSPECIFIED SITE OF BREAST (HCC): Primary | ICD-10-CM

## 2020-05-12 DIAGNOSIS — Z17.0 MALIGNANT NEOPLASM OF UPPER-OUTER QUADRANT OF LEFT BREAST IN FEMALE, ESTROGEN RECEPTOR POSITIVE (HCC): Primary | ICD-10-CM

## 2020-05-12 DIAGNOSIS — C50.919 MALIGNANT NEOPLASM OF BREAST IN FEMALE, ESTROGEN RECEPTOR POSITIVE, UNSPECIFIED LATERALITY, UNSPECIFIED SITE OF BREAST (HCC): Primary | ICD-10-CM

## 2020-05-12 PROCEDURE — 99024 POSTOP FOLLOW-UP VISIT: CPT | Performed by: SURGERY

## 2020-05-12 PROCEDURE — 99443 PR PHYS/QHP TELEPHONE EVALUATION 21-30 MIN: CPT | Performed by: INTERNAL MEDICINE

## 2020-05-12 PROCEDURE — 77412 RADIATION TX DELIVERY LVL 3: CPT | Performed by: RADIOLOGY

## 2020-05-12 PROCEDURE — 77336 RADIATION PHYSICS CONSULT: CPT | Performed by: RADIOLOGY

## 2020-05-12 PROCEDURE — 77280 THER RAD SIMULAJ FIELD SMPL: CPT | Performed by: RADIOLOGY

## 2020-05-12 NOTE — PROGRESS NOTES
Chief Complaint: Mela Muse is a  87 y.o. female, initially referred by No ref. provider found , who is here today for a postoperative visit.    History of Present Illness:  In the interim,Mela Muse has just about completed her radiation treatment.  She describes a little bit of tiredness related to the radiation.    She has noted no redness, warmth,drainage, swelling at the incision site. Denies fever or chills.      Current Outpatient Medications:   •  acetaminophen (TYLENOL) 500 MG tablet, Take 1,000 mg by mouth Every 6 (Six) Hours As Needed for Mild Pain ., Disp: , Rfl:   •  amLODIPine (NORVASC) 2.5 MG tablet, Take 2.5 mg by mouth Daily., Disp: , Rfl:   •  levothyroxine (SYNTHROID, LEVOTHROID) 75 MCG tablet, Take 75 mcg by mouth Daily., Disp: , Rfl:   •  meclizine (ANTIVERT) 12.5 MG tablet, Take 12.5 mg by mouth As Needed for Dizziness., Disp: , Rfl:   Physical examination  Left breast- the lumpectomy scar in the 12 o'clock position is healing nicely with very little architectural distortion.  The skin is slightly darker but she is handling the radiation well.  Assessment:  Left breast cancer status post breast conserving surgery consisting of a lumpectomy.  Pangburn lymph nodes were not evaluated in her.  She has just about completed her radiation and is following with the medical oncologist.    Plan:  If for some reason she does not follow with the medical oncologist, I have asked her to let me see her on a six-month basis.  If she will follow with them, then I will see her on an as-needed basis.          EMR Dragon/transcription disclaimer:    Much of this encounter note is an electronic transcription/translocation of spoken language to printed text.  The electronic translation of spoken language may permit erroneous, or at times, nonsensical words or phrases to be inadvertently transcribed.  Although I have reviewed the note from such areas, some may still exist.

## 2020-05-12 NOTE — PROGRESS NOTES
Physician Weekly Management Note    Diagnosis:     1. Malignant neoplasm of upper-outer quadrant of left breast in female, estrogen receptor positive (CMS/HCC)     Stage Unknown (cT1c, cNX, cM0, ER+, [place]+, HER2-)    Reason for Visit: Radiation (17/21)    Concurrent Chemo:   No    Notes on Treatment course, Films, Medical progress and Plan:  Doing well. Skin still fine. Only slight erythema. Heading to boost. Still slightly fatigued but ok. No problems or questions, cont on.    Performance Status: (1) Restricted in physically strenuous activity, ambulatory and able to do work of light nature  Subjective   ROS - Other than as listed above, as follow:  Constitutional - Normal - Denies lack of appetite, fatigue, fever, night sweats and change in weight.  Neck - Normal - Denies neck masses, muscle weakness, neck pain, decreased range of motion and swelling of the neck.  Breasts - Normal - Denies breast masses, nipple discharge, nipple inversion and pain.  Respiratory - Normal - Denies cough, dyspnea, hemoptysis, hiccoughs, pleuritic chest pain and wheezing.  Gastrointestinal - Normal - Denies abdominal pain, constipation, diarrhea, heartburn / dyspepsia, hematemesis, hemorrhoids, melena / GI bleeding, nausea, pain / cramping, satiety and vomiting.  Musculoskeletal - Normal - Denies arthritis, bone pain, joint pain, muscle weakness and decreased range of motion.   Hematologic/Lymphatic - Normal - Denies easy bruising and tender or enlarged lymph nodes.  There were no vitals filed for this visit.  Objective   PYHSICAL EXAM - Other than listed above, as follows:  Constitutional - Normal - No evidence of impaired alertness, inadequate appearance, premature or advanced chronologic age, uncooperativeness, altered mood and affect and disorientation.  Neck - Normal - No evidence of tender or enlarged lymph nodes, neck abnormalities, restricted range of motion and enlarged thyroid gland.  Breasts - Normal - No change in nipple or  "incision site.  Chest - Normal - No evidence of chest abnormalities and tender or enlarged lymph nodes.  Hematologic/Lymphatic -  Normal - No evidence of tender or enlarged axillae lymph nodes and tender or enlarged neck lymph nodes.     Technical aspects reviewed:  Weekly OBI approved if applicable? Yes  Weekly port films approved?   Yes  Change requests noted if applicable? Yes  Patient setup and plan reviewed?  Yes    Chart Reviewed:  Continue current treatment plan?   Yes  Treatment plan change requested?  No    I have reviewed and marked as \"reviewed\" the current medications, allergies and problem list in the patients EMR.  I have reviewed the patient's medical, surgical  history in detail, reviewed any pertinent lab work  and updated the computerized patient record if needed.    Patient's Care Team:  Patient Care Team:  Yuniel Bonilla MD as PCP - General  Yuniel Bonilla MD as PCP - Family Medicine  Self, Khoa Quick MD (Vascular Surgery)  Sima Joiner MD as Consulting Physician (Radiation Oncology)  Sowmya Faulkner MD as Consulting Physician (Hematology and Oncology)  Kush Plasencia MD as Referring Physician (Breast Surgery)      "

## 2020-05-12 NOTE — PROGRESS NOTES
Subjective       You have chosen to receive care through a telephone visit. Do you consent to use a telephone visit for your medical care today? {YES   REASON FOR FOLLOW UP:    1. pT1c pNX Invasive Lobular Carcinoma of the Left Breast measuring 20 mm, grade II.  Her tumor was ER+ 96%, OK+ 96%, and HER2/clayton Negative (0) with Ki67 of 3.34%.      2.  Patient was to start Arimidex February 2020 but has not started.  On further discussion she wants to avoid any medications including tamoxifen.    3.  Currently receiving radiation     4.  Osteopenia, had a bone density which I have reviewed with the patient.                               HISTORY OF PRESENT ILLNESS:      Patient is a 87-year-old female with history of T1c NX invasive lobular carcinoma of the left breast, 20 mm, ER OK positive HER-2/clayton negative who was to start Arimidex when I saw her last appointment but patient now refuses to take any medications..  Patient had a bone density as of March 5, 2020 and it is consistent with osteopenia.  She is taking calcium and vitamin D supplements.      She had appointment with Dr. Plasencia today.  She underwent a breast exam in his office and it was negative.  She is currently receiving radiation through Dr. Joiner.    We discussed about aromatase inhibitor and tamoxifen and she refuses any treatments for her breast cancer.      Oncologic history:  The patient is a 87 y.o. year old female who is here for an opinion about the above issue.    She is currently followed by PCP Dr. Yuniel Bonilla.  Patient had never had mammograms since 2013.  In October 2019 she noted indentation of the skin in the breast.  She did not have pain but she did feel a marble size lump at the time.  She had a screening mammogram in November 2019 which had revealed a 12 mm mass in the middle one-third region of the left breast at 1:00 position 4 cmfn.  She, then, went for a diagnostic mammogram and ultrasound in December.  The mammogram  revealed an irregular 15 mm mass in the left breast at the 12:00 position and the ultrasound revealed an irregular [id number] mm mass in the left breast at the 12:00 position 4 cmfn and an oval elongated mass measuring 7x3x5 mm in the left breast at the 1:00 position.  She was referred to Dr. Plasencia who performed an US-guided biopsy which showed Invasive Carcinoma with Ductal and Lobular features, low grade, measuring 15 mm, ER+ 96%, IL+ 96%, HER2/clayton negative (0), Ki67 3%.  The mass at the 1:00 position was a fibroadenoma.  She underwent a left breast lumpectomy on February 5, 2020 which confirmed Invasive Lobular Carcinoma of the Left Breast measuring 20 mm, grade II.  Her tumor was ER+ 96%, IL+ 96%, and HER2/clayton Negative (0).    Her personal history consists of vertigo, skin cancer of right leg in 2008 and of left nose in 2015.  She had a melanoma in 2017.    Her family history consists of her sister who had breast cancer at age 46.  Her maternal grandmother had breast cancer in her 70s.  Her daughter has chronic lymphocytic leukemia.        Details of the diagnostics scans and procedures are as follows:    11/27/19 - Screening Mammogram  Finding 1: There is a new irregular mass measuring 12 mm with spiculated margins seen inthe middle one-third region of the left beast at the 1:00 position located 4 cmfn.    Finding 2: There are vascular calcifications seen in both breasts.  No suspicious masses in the right breast.    12/26/19 - Diagnostic Mammogram  Finding 1: There is an irregular mass measuring 15 mm with spiculated margins in the upper outer region of the left breast 4 cmfn.      Ultrasound:  Finding 1: Irregular hypoechoic mass measuring [id number] mm at 12:00 4cmfn.    Finding 2: There is an oval elongated mass with indistinct margins measuring 7x3x5 mm in the left breast at 1:00, 1.7 cm from the mass at 12:00.    01/09/20 - US-Guided Biopsy  Left Breast 12:00 Position   Invasive Carcinoma with Ductal and  Lobular features, low grade, measuring 15 mm, ER+ 96%, ID+ 96%, HER2/clayton negative (0), Ki67 3%    Left Breast 1:00 Position  Fibroadenoma    02/05/20 - Left Breast Lumpectomy   A. Invasive lobular carcinoma:                            1. Invasive carcinoma measures 20 mm x  14 mm x 10 mm.                            2. Overall Bekah grade II (tubular score = 3, nuclear score = 2,                                mitotic score = 1).                            3. No lymphovascular invasion identified.                            4. Microcalcifications are present in the invasive component.               B. No carcinoma in situ component identified.               C. All margins are negative for invasive carcinoma.                    Carcinoma measures 2.0 mm from the closest (Medial) margin of excision.                     All other margins measure at least 4.0 mm from invasive carcinoma including:                            Anterior margin = 5 mm                            Posterior margin = 10 mm                            Superior margin = 20 mm                            Inferior margin =15mm                             Lateral margin = 4 mm                            Medial margin = 2 mm                  D. Rare focus of atypical lobular hyperplasia (ALH) noted, Not present at the margin. Focal fibroadenoma                    involved by invasive lobular carcinoma noted, Not present at the margin.               E. Focal biopsy site changes are identified.               F. Focal atypical ductal hyperplasia noted, not present at the margin.               G. Non-neoplastic breast tissue with intraductal papilloma, adenosis and fibrocystic change noted.                    Microcalcification noted in benign breast tissue.               H. Previous Biomarkers: Estrogen receptors: positive (95.53%), Progesterone receptors: positive (96.15%),                    HER/2-clayton: negative (0), Ki-67 = 3.34%    TUMOR   Tumor Site   Upper outer quadrant    Histologic Type  Invasive lobular carcinoma    Glandular (Acinar) / Tubular Differentiation  Score 3    Nuclear Pleomorphism  Score 2    Mitotic Rate  Score 1    Overall Grade  Grade 2 (scores of 6 or 7)    Tumor Size  Greatest dimension of largest invasive focus (Millimeters): 20 mm   Additional Dimension (Millimeters)  14 mm     10 mm   Tumor Focality  Single focus of invasive carcinoma    Ductal Carcinoma In Situ (DCIS)  Not identified    Lobular Carcinoma In Situ (LCIS)  No LCIS in specimen    Tumor Extent     Lymphovascular Invasion  Not identified    Dermal Lymphovascular Invasion  No skin present    Microcalcifications  Present in invasive carcinoma      Present in non-neoplastic tissue    Treatment Effect  No known presurgical therapy    MARGINS   Invasive Carcinoma Margins  Uninvolved by invasive carcinoma    Distance from Closest Margin (Millimeters)  5 mm   Closest Margin  Anterior    Distance from Other Margins     Anterior Margin (Millimeters)  5 mm   Posterior Margin (Millimeters)  10 mm   Superior Margin (Millimeters)  20 mm   Inferior Margin (Millimeters)  15 mm   Medial Margin (Millimeters)  8 mm   Lateral Margin (Millimeters)  10 mm   LYMPH NODES   Regional Lymph Nodes  No lymph nodes submitted or found    PATHOLOGIC STAGE CLASSIFICATION (pTNM, AJCC 8th Edition)   Primary Tumor (pT)  pT1c    Regional Lymph Nodes (pN)     Category (pN)  pNX    SPECIAL STUDIES   Breast Biomarker Testing Performed on Previous Biopsy     Estrogen Receptor (ER)  Positive (percentage): 95.5 %   Breast Biomarker Testing Performed on Previous Biopsy     Progesterone Receptor (PgR)  Positive (percentage): 96.2 %   Breast Biomarker Testing Performed on Previous Biopsy     HER2 (by immunohistochemistry)  Negative (Score 0)    Testing Performed on Case Number  [id number] (Not reviewed)    Comment(s)   Comment(s)  Ki67= 3.34%      Patient refused endocrine therapy    Currently on  "radiation      OB-GYN:  Menarche- 13  Menopause- 50  Pregnancies-  A0  Post menopausal HRT- Premarin for two years.  Hx of birth control pills- None    SOCIAL HISTORY:  Patient is retired and is .  She is a former smoker and she quit in .  She doesn't drink alcohol.    Past Medical History:   Diagnosis Date   • Anxiety    • Arthritis    • Cancer (CMS/HCC) 2020    left breast   • Coronary artery disease     \"BLOCKAGE IN MY LEGS\"   • Hemorrhoids    • History of basal cell cancer    • History of headaches    • Hypertension    • Hypothyroidism    • Melanoma (CMS/HCC) 2017    LEFT LEG   • PAD (peripheral artery disease) (CMS/HCC)    • Skin cancer     left nose   • Skin cancer     right leg   • Vertigo         Past Surgical History:   Procedure Laterality Date   • BREAST LUMPECTOMY WITH SENTINEL NODE BIOPSY Left 2020    Procedure: BREAST LUMPECTOMY WITH NEEDLE LOCALIZATION;  Surgeon: Kush Plasencia MD;  Location: [facility];  Service: General;  Laterality: Left;   • BREAST SURGERY Left     BIOPSY   • COLONOSCOPY      ALL OK   • COSMETIC SURGERY      AFTER SKIN EXCISION FROM NOSE   • ENDOSCOPY     • EYE SURGERY Bilateral     CATARACTS   • LEG SURGERY Left 2017    MELANOMA EXCISION-WITH SKIN GRAFT   • SKIN BIOPSY     • SKIN CANCER EXCISION Bilateral     NOSE, UPPER LIP   • SKIN GRAFT Bilateral    • VASCULAR SURGERY Bilateral 2018    STENTS IN BOTH UPPER LEGS        Current Outpatient Medications on File Prior to Visit   Medication Sig Dispense Refill   • acetaminophen (TYLENOL) 500 MG tablet Take 1,000 mg by mouth Every 6 (Six) Hours As Needed for Mild Pain .     • amLODIPine (NORVASC) 2.5 MG tablet Take 2.5 mg by mouth Daily.     • levothyroxine (SYNTHROID, LEVOTHROID) 75 MCG tablet Take 75 mcg by mouth Daily.     • meclizine (ANTIVERT) 12.5 MG tablet Take 12.5 mg by mouth As Needed for Dizziness.       No current facility-administered medications on file prior to " visit.         ALLERGIES:    Allergies   Allergen Reactions   • Codeine Mental Status Change        Social History     Socioeconomic History   • Marital status:      Spouse name: Vladimir   • Number of children: 4   • Years of education: Not on file   • Highest education level: Not on file   Occupational History     Employer: RETIRED   Tobacco Use   • Smoking status: Former Smoker     Last attempt to quit: 1970     Years since quittin.3   • Smokeless tobacco: Never Used   Substance and Sexual Activity   • Alcohol use: Never     Frequency: Never   • Drug use: Never   • Sexual activity: Defer        Family History   Problem Relation Age of Onset   • Heart failure Father    • Skin cancer Father    • Heart disease Father    • Breast cancer Sister 46   • Heart disease Sister    • Esophageal cancer Brother    • Heart disease Brother    • Leukemia Daughter    • Breast cancer Maternal Grandmother         70s   • Colon cancer Maternal Grandmother         Review of Systems   Constitutional: Negative.    Eyes: Negative.    Respiratory: Negative.    Cardiovascular: Negative.    Gastrointestinal: Negative.    Endocrine: Negative.    Genitourinary: Negative.    Musculoskeletal: Negative.    Skin: Negative.    Allergic/Immunologic: Negative.    Neurological: Positive for dizziness and headaches.   Hematological: Negative.    Psychiatric/Behavioral: Negative.    I have reviewed review of systems mainly 2020 and is negative    Objective     There were no vitals filed for this visit.  Current Status 2020   ECOG score 0       Physical Exam    This is a telephone visit and no physical examination done    RECENT LABS:  Hematology WBC   Date Value Ref Range Status   2020 7.17 3.40 - 10.80 10*3/mm3 Final   2018 6.22 4.5 - 11.0 10*3/uL Final     RBC   Date Value Ref Range Status   2020 5.05 3.77 - 5.28 10*6/mm3 Final   2018 4.34 4.0 - 5.2 10*6/uL Final     Hemoglobin   Date Value Ref Range Status    02/25/2020 15.4 12.0 - 15.9 g/dL Final   09/17/2018 13.2 12.0 - 16.0 g/dL Final     Hematocrit   Date Value Ref Range Status   02/25/2020 46.4 34.0 - 46.6 % Final   09/17/2018 41.3 36.0 - 46.0 % Final     Platelets   Date Value Ref Range Status   02/25/2020 179 140 - 450 10*3/mm3 Final   09/17/2018 457 (H) 140 - 440 10*3/uL Final          Assessment/Plan     1. pT1c pNX Invasive Lobular Carcinoma of the Left Breast measuring 20 mm, grade II.  Her tumor was ER+ 96%, VT+ 96%, and HER2/clayton Negative (0) with Ki67 of 3.34%.    -Screening mammogram, 12 mm mass, spiculated margins left breast, 1 o'clock position, 4 cm from the nipple  -Diagnostic mammogram, irregular 15 mm mass left breast , 4 cm from the nipple  -Ultrasound showed irregular mass 15 x 13 x 13 at 12:00, oval mass with indistinct margin 7 x 3 x 5 mm left breast at 1:00  -Left breast ultrasound-guided biopsy, 12 o'clock position, invasive carcinoma with ductal and lobular features  -left breast C, 1 o'clock position, fibroadenoma  -February 5, 2020, left breast lumpectomy, invasive lobular carcinoma, 20 x 14 x 10 mm, grade 2, Bekah score of 5 no lymphovascular space invasion, no evidence of DCIS, margins are all negative, rare focus of atypical lobular hyperplasia, not present at margin,    · I had a lengthy discussion with the patient and family that at the age of 86 years given that this is highly ER VT positive tumor, HER-2 negative, she is not a candidate for any chemotherapy given her age.  I do not see any need to obtain an Oncotype DX in the setting.  She is certainly a candidate for endocrine therapy.  My question would be if sentinel lymph node has to be considered given that she had a 20 mm invasive lobular carcinoma.  However patient is being seen by radiation oncology and will get input from them as well to see if axilla would be included.  Will discuss with Dr. Plasencia.  · Patient to consult radiation oncology for opinion on  03/03/20.  Given patient's cancer was ER/AZ+, she will certainly benefit from endocrine therapy.  Given her past medical history and current health, I think Arimidex would be the best choice of treatment.  Patient has refused to undergo any endocrine therapy after explaining the details of the side effects.  She is to follow-up with radiation oncology and so far 17 out of 21 treatments given    2. Family history of malignancy.  Her family history consists of her sister who had breast cancer at age 46.  Her maternal grandmother had breast cancer in her 70s.  Her daughter has chronic lymphocytic leukemia.  · Genetic testing could be considered to see if that information benefits the rest of the family.        3. History of skin cancer in the right leg (2008) and left nose (2015).    4. History of Melanoma of the left leg (2017).    5. Vertigo.  Patient reports feeling dizzy constantly.      PLAN:    1. Reviewed DEXA scan, patient with osteopenia  2. Discussed in length again about endocrine therapy with aromatase inhibitor Arimidex and she has not started it yet and refuses to start.  We also discussed tamoxifen and its side effects and she refuses any endocrine therapy.  3. Patient to take calcium and vitamin D supplements  4. Currently receiving radiation by Dr. Joiner  5. Patient had follow-up with Dr. Plasencia today and breast physical examination was negative per Dr. Plasencia's note  6. Follow-up with me in 3 months with labs    Thank you for allowing me to participate in the care of this patient       I have spent 60 minutes reviewing all of the chart and discussing with patient.    Sowmya Faulkner MD  May 12, 2020       Cc:  MD Sima Costa MD Stephen Self, MD Stuart Spalding, MD

## 2020-05-13 PROCEDURE — 77412 RADIATION TX DELIVERY LVL 3: CPT | Performed by: RADIOLOGY

## 2020-05-14 ENCOUNTER — TELEPHONE (OUTPATIENT)
Dept: ONCOLOGY | Facility: CLINIC | Age: 85
End: 2020-05-14

## 2020-05-14 PROCEDURE — 77412 RADIATION TX DELIVERY LVL 3: CPT | Performed by: RADIOLOGY

## 2020-05-14 NOTE — TELEPHONE ENCOUNTER
Dr. Faulkner wanted her to get 2 medications over the counter. She knows one was Vitamin B 3, but she is not sure what the other one is.    Please call her at- [phone]

## 2020-05-14 NOTE — TELEPHONE ENCOUNTER
PT CALLING ABOUT OTC MED TO START TAKING. PER LAST DICTATION PT TO TAKE CA AND VIT D. EXPLAINED TO PT WHAT TO LOOK FOR AT STORE AND HOW TO TAKE. SHE V/U.

## 2020-05-15 PROCEDURE — 77412 RADIATION TX DELIVERY LVL 3: CPT | Performed by: RADIOLOGY

## 2020-05-18 ENCOUNTER — DOCUMENTATION (OUTPATIENT)
Dept: RADIATION ONCOLOGY | Facility: HOSPITAL | Age: 85
End: 2020-05-18

## 2020-05-18 DIAGNOSIS — C50.919 MALIGNANT NEOPLASM OF BREAST IN FEMALE, ESTROGEN RECEPTOR POSITIVE, UNSPECIFIED LATERALITY, UNSPECIFIED SITE OF BREAST (HCC): Primary | ICD-10-CM

## 2020-05-18 DIAGNOSIS — Z17.0 MALIGNANT NEOPLASM OF BREAST IN FEMALE, ESTROGEN RECEPTOR POSITIVE, UNSPECIFIED LATERALITY, UNSPECIFIED SITE OF BREAST (HCC): Primary | ICD-10-CM

## 2020-05-18 PROCEDURE — 77412 RADIATION TX DELIVERY LVL 3: CPT | Performed by: RADIOLOGY

## 2020-05-19 NOTE — PROGRESS NOTES
RADIATION THERAPY TREATMENT SUMMARY  Patient: Mela Muse  YOB: 1933  Diagnosis:    Malignant neoplasm of upper-outer quadrant of left breast in female, estrogen receptor positive (CMS/HCC)       Mela Muse has recently completed the course of radiation therapy prescribed for the above-mentioned diagnosis.  Below, please find the specifics of the course of treatment delivered:    Radiation Details:    Tx Start Date  4/20/2020    Tx End date  5/18/2020  Intent   Curative   Total Treatments 21    Site   Breast, Left  Primary/Boost  PRIMARY  Dose Per Fraction 266 cGy/Frac  Number of Fractions 16  Prescribe To  IsodoseLine 100 % 4256 cGy  266 cGy/Frac  Mode    Photon  Technique  TANGENTS  Frequency  5 Times a week  Energy   6X  Prescription Note PRESCRIBED 100% TO [facility] PT    Site   Breast, Left  Primary/Boost  BOOST  Dose Per Fraction 200 cGy/Frac  Number of Fractions 5  Prescribe To  IsodoseLine 100 % 1000 cGy  200 cGy/Frac  Mode    Electron  Technique  ELECTRONS  Frequency  5 Times a week  Energy   9E  Prescription Note PRESCRIBED 100% TO [facility] PT    Tolerance and Toxicities: she tolerated the treatments well, only slight erythema and fatigue, requiring  no treatment breaks. she completed the treatments in 27 elapsed days.    Follow-up Plans:  I have asked the patient to return to see me in approximately 4 weeks.  I have also made a referral to our [facility]orship [facility].

## 2020-05-19 NOTE — TELEPHONE ENCOUNTER
PTS  CALLED IN REQUESTING A REFILL ON     amLODIPine (NORVASC) 2.5 MG tablet      PT CALL BACK  [phone]  PHARMACY CONFIRMED

## 2020-05-20 RX ORDER — AMLODIPINE BESYLATE 2.5 MG/1
2.5 TABLET ORAL DAILY
Qty: 90 TABLET | Refills: 1 | Status: SHIPPED | OUTPATIENT
Start: 2020-05-20 | End: 2020-11-22

## 2020-06-01 RX ORDER — LEVOTHYROXINE SODIUM 0.07 MG/1
75 TABLET ORAL DAILY
Qty: 90 TABLET | Refills: 3 | Status: SHIPPED | OUTPATIENT
Start: 2020-06-01 | End: 2021-04-08 | Stop reason: SDUPTHER

## 2020-06-23 ENCOUNTER — TELEMEDICINE (OUTPATIENT)
Dept: RADIATION ONCOLOGY | Facility: HOSPITAL | Age: 85
End: 2020-06-23

## 2020-06-23 DIAGNOSIS — C50.919 MALIGNANT NEOPLASM OF BREAST IN FEMALE, ESTROGEN RECEPTOR POSITIVE, UNSPECIFIED LATERALITY, UNSPECIFIED SITE OF BREAST (HCC): Primary | ICD-10-CM

## 2020-06-23 DIAGNOSIS — Z17.0 MALIGNANT NEOPLASM OF BREAST IN FEMALE, ESTROGEN RECEPTOR POSITIVE, UNSPECIFIED LATERALITY, UNSPECIFIED SITE OF BREAST (HCC): Primary | ICD-10-CM

## 2020-06-23 PROCEDURE — 99024 POSTOP FOLLOW-UP VISIT: CPT | Performed by: RADIOLOGY

## 2020-06-23 NOTE — PROGRESS NOTES
DIAGNOSIS and REASON FOR VIDEO FOLLOW UP:  Malignant neoplasm of breast in female, estrogen receptor positive (CMS/HCC)  (cT1c, cNX, cM0, ER+, IL+, HER2-)    CHIEF COMPLAINT:  4 week follow up  I had the pleasure of seeing Mela Muse for follow up via VIDEO visit today, now approximately 4 weeks out from the radiation therapy portion of her treatment for the above mentioned diagnosis. Clinically she is doing fairly well.  Her energy has improved though she is frustrated that it is slow. She continues to walk 1/2 an hour twice a day and she states her performance status is nearly back to baseline.  She initially complained of shortness of breath but on further questioning, she had no respiratory symptoms but felt it was related to the fatigue. She is eating and sleepign well she states. She has no new complaints regarding the breast.    Subjective    Past Medical History: she  has a past medical history of Anxiety, Arthritis, Cancer (CMS/[facility]) (01/2020), Coronary artery disease, Hemorrhoids, History of basal cell cancer, History of headaches, Hypertension, Hypothyroidism, Melanoma (CMS/HCC) (2017), PAD (peripheral artery disease) (CMS/[facility]), Skin cancer (2015), Skin cancer (2008), and Vertigo.    Past Surgical History:  she has a past surgical history that includes Skin graft (Bilateral); Leg Surgery (Left, 2017); Skin biopsy (2008); Skin cancer excision (Bilateral, 2015); Breast surgery (Left, 2020); Colonoscopy; Cosmetic surgery; Esophagogastroduodenoscopy; Eye surgery (Bilateral); Vascular surgery (Bilateral, 2018); and breast lumpectomy with sentinel node biopsy (Left, 2/5/2020).    Meds:    Current Outpatient Medications:   •  acetaminophen (TYLENOL) 500 MG tablet, Take 1,000 mg by mouth Every 6 (Six) Hours As Needed for Mild Pain ., Disp: , Rfl:   •  amLODIPine (NORVASC) 2.5 MG tablet, Take 1 tablet by mouth Daily., Disp: 90 tablet, Rfl: 1  •  levothyroxine (SYNTHROID, LEVOTHROID) 75 MCG tablet, Take 1  tablet by mouth Daily., Disp: 90 tablet, Rfl: 3  •  meclizine (ANTIVERT) 12.5 MG tablet, Take 12.5 mg by mouth As Needed for Dizziness., Disp: , Rfl:     Allergies:    Allergies   Allergen Reactions   • Codeine Mental Status Change       Family History:  her family history includes Breast cancer in her maternal grandmother; Breast cancer (age of onset: 46) in her sister; Colon cancer in her maternal grandmother; Esophageal cancer in her brother; Heart disease in her brother, father, and sister; Heart failure in her father; Leukemia in her daughter; Skin cancer in her father.    Social History:  she  reports that she quit smoking about 50 years ago. She has never used smokeless tobacco. She reports that she does not drink alcohol or use drugs.    Pertinent Findings on Review of Systems:  Fourteen systems were reviewed and are negative other than as mentioned above.     Objective   Pertinent Findings on VIDEO Physical Exam:  There were no vitals filed for this visit.    Performance Status:  (1) Restricted in physically strenuous activity, ambulatory and able to do work of light nature       Assessment:   Malignant neoplasm of breast in female, estrogen receptor positive (CMS/HCC)  (cT1c, cNX, cM0, ER+, NV+, HER2-)   Doing wonderfully fairly well, 4 weeks out from radiation therapy - just some residual though improving fatigue    Plan:   We reviewed today the current NCCN guidelines for her surveillance and follow up, specifically the need for physician visit every 4 to 6 months for 5 years, annual mammogram, annual gynecologic assessment, continued monitoring of bone health and achieving and maintaining an ideal BMI.      She has decided against the hormonal therapy but will return to see the [facility] physicians on August 4, 2020 and understands she will continue follow-up through that office. Regarding her mammographic follow-up, she knows to continue on with her annual bilateral mammogram in late November, 2020.  Given  the above, I have not given her an appointment to return here, but encouraged her to call if I can be of any further help in her care and thank you again for allowing us to participate in her care.

## 2020-07-30 ENCOUNTER — OFFICE VISIT (OUTPATIENT)
Dept: OTHER | Facility: HOSPITAL | Age: 85
End: 2020-07-30

## 2020-07-30 VITALS
OXYGEN SATURATION: 98 % | RESPIRATION RATE: 18 BRPM | SYSTOLIC BLOOD PRESSURE: 149 MMHG | WEIGHT: 138.3 LBS | DIASTOLIC BLOOD PRESSURE: 67 MMHG | TEMPERATURE: 98.6 F | BODY MASS INDEX: 23.01 KG/M2 | HEART RATE: 72 BPM

## 2020-07-30 DIAGNOSIS — C50.919 MALIGNANT NEOPLASM OF BREAST IN FEMALE, ESTROGEN RECEPTOR POSITIVE, UNSPECIFIED LATERALITY, UNSPECIFIED SITE OF BREAST (HCC): Primary | ICD-10-CM

## 2020-07-30 DIAGNOSIS — Z17.0 MALIGNANT NEOPLASM OF BREAST IN FEMALE, ESTROGEN RECEPTOR POSITIVE, UNSPECIFIED LATERALITY, UNSPECIFIED SITE OF BREAST (HCC): Primary | ICD-10-CM

## 2020-07-30 PROCEDURE — G0463 HOSPITAL OUTPT CLINIC VISIT: HCPCS | Performed by: NURSE PRACTITIONER

## 2020-07-30 PROCEDURE — 99215 OFFICE O/P EST HI 40 MIN: CPT | Performed by: NURSE PRACTITIONER

## 2020-07-30 NOTE — PROGRESS NOTES
[facility] MULTIDISCIPLINARY CLINIC  SURVIVORSHIP VISIT    Mela Muse is a pleasant 87 y.o. female being followed by Sowmya Faulkner MD  for pT1c pNX Invasive Lobular Carcinoma of the Left Breast measuring 20 mm, grade II. Here today with her daughter Nighat in our Cancer Survivorship Clinic, to review survivorship care plan.    TREATMENT HISTORY:        Malignant neoplasm of breast in female, estrogen receptor positive (CMS/HCC)    1/9/2020 Initial Diagnosis     Malignant neoplasm of upper-outer quadrant of left breast in female, estrogen receptor positive (CMS/HCC)      1/9/2020 Biopsy     1.  Left breast at 12:00, ultrasound-guided biopsy:  Invasive carcinoma with ductal and lobular features (mixed carcinoma).  - Low combined histologic grade (tubular score 3, nuclear score 1, mitotic score 1 with 1 mitosis per 10 high-power fields).  -The tumor is present in 13 of 13 core biopsies fragments, the largest measuring 15 mm.  -No evidence of lymphovascular invasion.  (See comment.)    2.  Left breast at 1:00, ultrasound-guided biopsies:  Fibroadenoma.    Estrogen receptor: Positive at 95.53%  Progesterone receptor: Positive at 96.15%  HER2 status: Negative at 0 by IHC  Ki 67: 3.34%      2/10/2020 Surgery     1. Left Breast Lumpectomy:                A. Invasive lobular carcinoma:                            1. Invasive carcinoma measures 20 mm x  14 mm x 10 mm.                            2. Overall Bekah grade II (tubular score = 3, nuclear score = 2,                                mitotic score = 1).                            3. No lymphovascular invasion identified.                            4. Microcalcifications are present in the invasive component.               B. No carcinoma in situ component identified.               C. All margins are negative for invasive carcinoma.                    Carcinoma measures 2.0 mm from the closest (Medial) margin of excision.                      All other margins measure at least 4.0 mm from invasive carcinoma including:                            Anterior margin = 5 mm                            Posterior margin = 10 mm                            Superior margin = 20 mm                            Inferior margin =15mm                             Lateral margin = 4 mm                            Medial margin = 2 mm                  D. Rare focus of atypical lobular hyperplasia (ALH) noted, Not present at the margin. Focal fibroadenoma                    involved by invasive lobular carcinoma noted, Not present at the margin.               E. Focal biopsy site changes are identified.               F. Focal atypical ductal hyperplasia noted, not present at the margin.               G. Non-neoplastic breast tissue with intraductal papilloma, adenosis and fibrocystic change noted.                    Microcalcification noted in benign breast tissue.               H. Previous Biomarkers: Estrogen receptors: positive (95.53%), Progesterone receptors: positive (96.15%),                    HER/2-clyaton: negative (0), Ki-67 = 3.34% (see KY20-41 not reviewed).     2. Left Breast, New Lateral Margin:               A. No in situ nor infiltrating carcinoma identified.               B. New margin is negative for malignancy by an additional 6 mm.     3. Left Breast, New Medial Margin:               A. No in situ nor infiltrating carcinoma identified.               B. New margin is negative for malignancy by an additional 6 mm.    Pathologic stage: pT1c pNX      2/26/2020 Cancer Staged     Staging form: Breast, AJCC 8th Edition  - Clinical: Stage Unknown (cT1c, cNX, cM0, ER+, NH+, HER2-) - Signed by Sowmya Faulkner MD on 2/26/2020      3/5/2020 Imaging     BONE MINERAL DENSITOMETRY     HISTORY: Postmenopausal. History of breast cancer.     COMPARISON: None.     TECHNIQUE: The T score compares the patient's bone mineral density with  the peak bone mass of young normal  patients. Patients with T-scores  between 1.0 and 2.5 standard deviations below the mean are osteopenic.  Patients with T-scores greater than 2.5 standard deviation below the  mean are osteoporotic.     FINDINGS:   LUMBAR SPINE:  The BMD measured in the L1-L4 is 0.844 g/cm2 for a  T-score of -1.8.      LEFT HIP: The BMD for the femoral neck is 0.619g/cm2 for a T score of   -2.1.      RIGHT HIP:  The BMD for the femoral neck is 0.639g/cm2 for a T score of  -1.9.      IMPRESSION:  1. Osteopenia.  2. The 10 year FRAX fracture risk for a major osteoporotic fracture is  19% and for a hip fracture is 5.9%.      4/20/2020 - 5/18/2020 Radiation     Left breast received 4256 cGy in 16 treatments  Left breast boost received 1000 cGy in 5 treatments           Allergies as of 07/30/2020 - Reviewed 07/30/2020   Allergen Reaction Noted   • Codeine Mental Status Change 01/23/2020       MEDICATIONS:  I have reviewed the medication list with the patient and I updated it in the electronic medical record. Medication dosages and frequencies were confirmed to be accurate with the patient.      Review of Systems   Constitutional: Positive for fatigue. Negative for activity change, appetite change, chills, fever and unexpected weight change.   Respiratory: Negative for cough, chest tightness and shortness of breath.    Cardiovascular: Positive for leg swelling (bilateral ankles). Negative for chest pain.   Gastrointestinal: Negative for blood in stool, constipation and diarrhea.   Genitourinary: Negative for hematuria.   Musculoskeletal: Negative for arthralgias and myalgias.   Neurological: Positive for dizziness (vertigo). Negative for light-headedness.        Denies falls in last 6 months   Psychiatric/Behavioral: Negative for decreased concentration, dysphoric mood and sleep disturbance. The patient is not nervous/anxious.        /67   Pulse 72   Temp 98.6 °F (37 °C) (Temporal)   Resp 18   Wt 62.7 kg (138 lb 4.8 oz)   SpO2  "98% Comment: room air  BMI 23.01 kg/m²     Wt Readings from Last 3 Encounters:   07/30/20 62.7 kg (138 lb 4.8 oz)   03/03/20 63.4 kg (139 lb 12.8 oz)   02/27/20 63.5 kg (139 lb 14.4 oz)       Pain Score    07/30/20 1149   PainSc: 0-No pain         Physical Exam   Constitutional: She is oriented to person, place, and time. She appears well-developed and well-nourished. She is cooperative.   HENT:   Head: Normocephalic and atraumatic.   Cardiovascular: Normal rate and regular rhythm.   No lower extremity edema   Pulmonary/Chest: Effort normal. No respiratory distress.   Abdominal: Soft. Normal appearance.   Musculoskeletal: Normal range of motion.   Neurological: She is alert and oriented to person, place, and time.   Skin: Skin is warm, dry and intact.   Psychiatric: She has a normal mood and affect. Her speech is normal and behavior is normal. Judgment and thought content normal. Cognition and memory are normal.   Vitals reviewed.        Problem List Items Addressed This Visit        Active Problems    Malignant neoplasm of breast in female, estrogen receptor positive (CMS/HCC) - Primary            SURVIVORSHIP DISCUSSION HELD TODAY:   Primary patient goal(s): wellness     Management of disease and treatment related effects: Patient is doing quite well since completion of radiation.  She does have some persistent fatigue and in general just feeling \"worn out\".  She indicates fatigue and breathing as issues of concern on her distress screening.  When she describes her concerns around breathing she feels this is more related to just feeling tired and denies feeling short of breath.  She has elected to decline endocrine therapy after discussion with Dr. Faulkner and feels good about her treatment decision making.    Discussed strategies for preservation of bone mass including calcium and vitamin D supplementation and importance of weight bearing exercise to include moderate intensity walking most days of the week.  As " her last DEXA scan showed osteopenia she has begun calcium and vitamin D supplementation per recommendations from Dr. Faulkner.    We spent a little bit of time discussing lymphedema causes, early signs and symptoms, prevention, and management.  She did not have a sentinel lymph node biopsy at the time of her lumpectomy.  We did discuss there could be some theoretical risk related to radiation and I provided her with some written information to review as well as a contact information for the lymphedema clinic.  She does have good range of motion of her upper extremities with a little bit of axillary and pectoral tightness in the left upper extremity that she is working on loosening with gentle stretches.    She reports having some mild swelling of her lower extremities towards the end of the day which resolves after putting her feet up for a while or overnight and this was present at baseline and has not changed.  She also reports having vertigo which was present at baseline and has not changed and is well controlled.  She denies any falls within the last 6 months.    She has follow-up with Dr. Faulkner scheduled next week and I have encouraged her to discuss timing of next screening mammogram and plan for long-term follow-up at that appointment.    Psychosocial and spiritual:   · Distress score: 0  · GAD7: 1  · PHQ9: 4    Patient reports doing exceptionally well.  She recalls the hardest part of treatment was getting through radiation.  Her family has been an excellent source of support for her.  She denies the need for additional support at this time.    Advance Care Planning   Advance Care Planning Discussion:    Patient does have advance care planning complete and it is scanned into her medical record.  We reviewed the document on file dated 2017.  She has chosen her spouse Vladimir as her healthcare decision maker.     Brief discussion and written information provided regarding advance care planning and  appropriateness for all healthy adults, choosing a healthcare surrogate, prior experiences with loved ones who have been seriously ill, exploration of goals of care in the event of a sudden injury or illness, and upcoming Advance Care Planning classes offered [facility].           Maintaining personal wellness: We discussed current BMI of 23 and recommended target of 20-25 for wellness, cardiovascular health and risk reduction for cancer recurrence and prevention. We discussed availability of oncology registered dietician, Ofelia Wu and referral offered. Patient declines at this time. Ofelia's contact info and handout describing recommended dietary modifications emphasizing whole foods, plant based diet provided.  She does belong to the [facility] and is attending 3 days a week.  We discussed virtual opportunities for fitness and wellness programming available at [employer]               Discussed NCCN recommendations for all cancer survivors of 150 minutes/week moderate intensity exercise, achieve and maintain a healthy BMI, plants-based whole-foods diet, avoid tobacco and second hand smoke, avoid alcohol or minimize alcohol intake - no more than 1 drink in a day for women, 2 drinks in a day for men.     No orders of the defined types were placed in this encounter.      After review of the Survivorship Treatment Summary & Care Plan, the patient verbalized understanding of recommendations for follow-up. As outlined in the care plan, they were advised to continue with follow-up care in accordance with the NCCN surveillance guidelines while transitioning back to their primary care physician for continued general preventive and healthcare needs. We discussed the importance of healthy eating, exercise and weight management. We reviewed current guidelines for routine screening of other cancers.     A copy of the Survivorship Treatment Summary & Care Plan for Ms. Muse (see below) was provided to  "and forwarded to the providers identified on the care team.      Greater than 40 minutes spent with patient, more than half of that spent face-to-face counseling patient extensively on treatment summary, surveillance for recurrence, late and long-term effects of disease and treatment, intimacy and self-image, preventative screening for other cancers, achieving/maintaining healthy BMI and link to risk reduction, adherence to current therapies, management of anxiety/uncertainty and self care strategies.         Breast Cancer Survivorship Plan      General Information   Patient name Mela Muse   Date of birth 2/28/1933    Phone Home Phone [phone]        Email [email or web address]      Cancer Treatment Team     Patient Care Team:  Sima Joiner MD as Consulting Physician (Radiation Oncology)  Sowmya Faulkner MD as Consulting Physician (Hematology and Oncology)  Kush Plasencia MD as Referring Physician (Breast Surgery)    Provider Phone numbers  Care Team Provider: Yuniel Bonilla MD,  ([phone])  Care Team Provider: Khoa Mathis MD,  ([phone])  Care Team Provider: Sima Joiner MD,  ([phone])  Care Team Provider: Sowmya Faulkner MD,  ([phone])  Care Team Provider: Kush Plasencia MD,  ([phone])     Post Treatment Care Team   Primary Care Physician Yuniel Bonilla MD  [address] [id number] [phone]         Background Information   Medical history Past Medical History:   • Anxiety   • Arthritis   • Cancer (CMS/HCC)    left breast   • Coronary artery disease    \"BLOCKAGE IN MY LEGS\"   • Hemorrhoids   • History of basal cell cancer   • History of headaches   • Hypertension   • Hypothyroidism   • Melanoma (CMS/HCC)    LEFT LEG   • PAD (peripheral artery disease) (CMS/HCC)   • Skin cancer    left nose   • Skin cancer    right leg   • Vertigo        Surgical history Past Surgical History:   • BREAST LUMPECTOMY WITH " SENTINEL NODE BIOPSY    Procedure: BREAST LUMPECTOMY WITH NEEDLE LOCALIZATION;  Surgeon: Kush Plasencia MD;  Location: [facility];  Service: General;  Laterality: Left;   • BREAST SURGERY    BIOPSY   • COLONOSCOPY    ALL OK   • COSMETIC SURGERY    AFTER SKIN EXCISION FROM NOSE   • ENDOSCOPY   • EYE SURGERY    CATARACTS   • LEG SURGERY    MELANOMA EXCISION-WITH SKIN GRAFT   • SKIN BIOPSY   • SKIN CANCER EXCISION    NOSE, UPPER LIP   • SKIN GRAFT   • VASCULAR SURGERY    STENTS IN BOTH UPPER LEGS        Tobacco use Social History     Tobacco Use   Smoking Status Former Smoker   • Last attempt to quit: 1970   • Years since quittin.6   Smokeless Tobacco Never Used        Family oncology history Cancer-related family history includes Breast cancer in her maternal grandmother; Breast cancer (age of onset: 46) in her sister; Colon cancer in her maternal grandmother; Esophageal cancer in her brother; Skin cancer in her father.      Oncology Information      Malignant neoplasm of breast in female, estrogen receptor positive (CMS/HCC)    2020 Initial Diagnosis     Malignant neoplasm of upper-outer quadrant of left breast in female, estrogen receptor positive (CMS/HCC)        2020 Biopsy     1.  Left breast at 12:00, ultrasound-guided biopsy:  Invasive carcinoma with ductal and lobular features (mixed carcinoma).  - Low combined histologic grade (tubular score 3, nuclear score 1, mitotic score 1 with 1 mitosis per 10 high-power fields).  -The tumor is present in 13 of 13 core biopsies fragments, the largest measuring 15 mm.  -No evidence of lymphovascular invasion.  (See comment.)    2.  Left breast at 1:00, ultrasound-guided biopsies:  Fibroadenoma.    Estrogen receptor: Positive at 95.53%  Progesterone receptor: Positive at 96.15%  HER2 status: Negative at 0 by IHC  Ki 67: 3.34%        2/10/2020 Surgery     1. Left Breast Lumpectomy:                A. Invasive lobular carcinoma:                             1. Invasive carcinoma measures 20 mm x  14 mm x 10 mm.                            2. Overall Prospect grade II (tubular score = 3, nuclear score = 2,                                mitotic score = 1).                            3. No lymphovascular invasion identified.                            4. Microcalcifications are present in the invasive component.               B. No carcinoma in situ component identified.               C. All margins are negative for invasive carcinoma.                    Carcinoma measures 2.0 mm from the closest (Medial) margin of excision.                     All other margins measure at least 4.0 mm from invasive carcinoma including:                            Anterior margin = 5 mm                            Posterior margin = 10 mm                            Superior margin = 20 mm                            Inferior margin =15mm                             Lateral margin = 4 mm                            Medial margin = 2 mm                  D. Rare focus of atypical lobular hyperplasia (ALH) noted, Not present at the margin. Focal fibroadenoma                    involved by invasive lobular carcinoma noted, Not present at the margin.               E. Focal biopsy site changes are identified.               F. Focal atypical ductal hyperplasia noted, not present at the margin.               G. Non-neoplastic breast tissue with intraductal papilloma, adenosis and fibrocystic change noted.                    Microcalcification noted in benign breast tissue.               H. Previous Biomarkers: Estrogen receptors: positive (95.53%), Progesterone receptors: positive (96.15%),                    HER/2-clayton: negative (0), Ki-67 = 3.34% (see KY20-41 not reviewed).     2. Left Breast, New Lateral Margin:               A. No in situ nor infiltrating carcinoma identified.               B. New margin is negative for malignancy by an additional 6 mm.     3. Left Breast, New Medial Margin:                A. No in situ nor infiltrating carcinoma identified.               B. New margin is negative for malignancy by an additional 6 mm.    Pathologic stage: pT1c pNX        2/26/2020 Cancer Staged     Staging form: Breast, AJCC 8th Edition  - Clinical: Stage Unknown (cT1c, cNX, cM0, ER+, WA+, HER2-) - Signed by Sowmya Faulkner MD on 2/26/2020            3/5/2020 Imaging     BONE MINERAL DENSITOMETRY     HISTORY: Postmenopausal. History of breast cancer.     COMPARISON: None.     TECHNIQUE: The T score compares the patient's bone mineral density with  the peak bone mass of young normal patients. Patients with T-scores  between 1.0 and 2.5 standard deviations below the mean are osteopenic.  Patients with T-scores greater than 2.5 standard deviation below the  mean are osteoporotic.     FINDINGS:   LUMBAR SPINE:  The BMD measured in the L1-L4 is 0.844 g/cm2 for a  T-score of -1.8.      LEFT HIP: The BMD for the femoral neck is 0.619g/cm2 for a T score of   -2.1.      RIGHT HIP:  The BMD for the femoral neck is 0.639g/cm2 for a T score of  -1.9.      IMPRESSION:  1. Osteopenia.  2. The 10 year FRAX fracture risk for a major osteoporotic fracture is  19% and for a hip fracture is 5.9%.        4/20/2020 - 5/18/2020 Radiation     Left breast received 4256 cGy in 16 treatments  Left breast boost received 1000 cGy in 5 treatments                 Complications during Therapy:   No concerns stated   Modification to Treatment Plan:  No Modifications      Lifetime Dose Tracking   No doses have been documented on this patient for the following tracked chemicals: Doxorubicin, Epirubicin, Idarubicin, Daunorubicin, Mitoxantrone, Bleomycin, Mitomycin, Doxorubicin Liposomal             [No treatment plan]         Persistent Treatment-Associated Adverse Effects at Completion of Therapy   It is important to recognize that not every person experiences the following adverse events after treatment.  You may not have any of these  issues, a few or many adverse effects.  Experiences are highly variable.  Please discuss any adverse effects of cancer treatment with your cancer care team.      After Surgical Therapy   Breast Conserving Surgery (Lumpectomy)     Breast conserving surgery (lumpectomy) involves the removal of the breast mass (cancer lump) and a surrounding area of normal tissue. After surgery, there may be pain and soreness in the chest, underarm or shoulder which should get better over time. Nerves may be damaged in the breast and areas of lymph node removal which may result in numbness and other changes in sensation. Ask your doctor or nurse if you have issues with pain, numbness or tingling, or any changes in function or mobility.      Breast conserving surgery allows women to keep their breast but the breast may look different than it did before surgery. The breast may be smaller and may be different in size and shape. There will be a scar from the surgery and scar tissue may feel different. Radiation therapy can also affect the way the breast looks and feels. How you think and feel about your body is important and coping with changes after breast surgery takes time.  It's important to look at your scar, which should become less red and swollen over time. It's important to touch your scar, too.  Your physician may give you instructions about massaging the scar to help with healing and to soften scar tissue. If you have a partner, let your partner look at and feel the scar when you're ready.  Working through feelings about the cancer and changes as a result of surgery may take time and support. Talk with your doctor or nurse about any issues with body image and coping.      Survivors of breast cancer should speak with their health care provider regarding the possibility of a genetic or family syndrome. If there does appear to be a family history or possible genetic syndrome, genetic counseling and testing may be  recommended.      [place] Node Biopsy   Removal of the sentinel lymph node is the removal of the first lymph node to which cancer cells are most likely to spread. After surgery, there may be pain and soreness in the area where the node was removed.  Nerves may be damaged which may result in numbness or other changes in sensation. While sentinel node biopsy (as opposed to a lymph node dissection where more lymph nodes are removed) decreases the risk of developing lymphedema, the risk is not completely gone.     Lymphedema   Removal of lymph nodes can slow the normal flow of lymph in the area which can lead to swelling in that limb, also called lymphedema.  Survivors who also received radiation therapy to the area where a lymph node was removed may be at increased risk of developing lymphedema. In some cases, the lymphedema can occur years after cancer therapy was completed. Lymphedema can cause pain or discomfort, disfigurement, change in function, and increased risk of infection in the affected area and closest limb. Signs of lymphedema can include a feeling of fullness or heaviness, changes in the skin (red, thick, stiff), aching, tightness, and difficulty moving or flexing nearby joints.  Other signs could be that your jewelry or clothes, like socks, pants or sleeves, may begin to feel tight on the affected limb. As signs of lymphedema could develop months or years after treatment, continue to monitor for signs and notify your doctor.      Several steps can be taken to help prevent and control lymphedema. Survivors should protect the potentially affected limb by avoiding cuts, scrapes, burns, insect bites, shots/vaccines, blood draws, and IV sticks in order to decrease the risk of developing an infection in the limb. In addition, the survivor should protect the limb from the sun to avoid sunburn.  Lastly, survivors should avoid tight clothing and jewelry, and blood pressures in the affected limb that might  further slow the normal flow of lymph.      Survivors of cancer, including those at risk for lymphedema, can and should exercise. Survivors should start slow and gradually increase intensity while monitoring your limb for changes in swelling or redness. If either occurs, stop exercising and notify your doctor for further direction.          After Chemotherapy   No chemotherapy received.      After Radiation Therapy   Radiation therapy can cause early and late side effects.  Early side effects are those that happen during or shortly after treatment and are usually gone within a few weeks after treatment ends.  Late side effects may take months or years to develop and vary depending on the areas of the body included in the field of radiation and the radiation techniques that were used.      The most common early side effects are fatigue (feeling tired) and skin changes.  Other early side effects are usually related to the area being treated. If you continue to have some skin problems after treatment ends, be gentle with the skin in the treatment area until all signs of irritation are gone and continue to care for your skin as your doctors and nurses have advised. If you continue to have fatigue, you may need to plan your activities to maximize energy, get extra rest while your body is still recovering and follow other instructions from your doctors and nurses such as exercise and activity.    Long term effects of radiation therapy vary greatly depending on the areas included in the field of radiation and the radiation techniques that were used. Breast tissue exposed to radiation may become more firm over time and you may notice changes in the size and shape of the breast. The skin where the breast was treated may have a different coloration, be more red or appear tanned. If the lymph nodes under the arm (axillary nodes) were in the radiation field, there may be an increased risk of developing lymphedema.  Lymphedema  is a condition in which fluid collects in the arm or other areas such as the hand, fingers, chest or back and cause swelling. In rare cases, radiation therapy can increase the risk of a second cancer. Ask your doctors and nurses about the risk of long term effects. Keep your follow up appointments and keep up with recommended health screenings      Hormone Therapy   Not receiving hormone therapy      Care of your Venous Access Device   No Venous Access Device currently in place      General After Cancer Treatment        It is not uncommon for cancer to impact other areas of your life such as relationships, work and mental health.  If you develop financial concerns, resources are sometimes available to assist in these areas.  Depression and anxiety can present either during or after cancer diagnosis and treatment.  It is important to discuss with your physician any of these concerns so these resources can be made available to you.      General Cancer Support & Resources    [facility]:  [address] [place] [address] [address]  [place] [address]  [phone]    Survivorship Clinical Nurse Specialist  Idania Clemons [facility] [phone]    Oncology Social Worker:  Saima Guerrero [facility] - [phone]     Psychiatric Nurse Practitioner:  Yuly Singh [facility] - [phone]    Financial Counselor and Contact Information:  [facility] - [phone]    Oncology Dietician Contact Information:  Oeflia Wu  - [phone]      General COVID-19 Questions:    [place] Coronavirus Hotline  5.464.202.7130    [facility]  [phone] or [phone] after hours.         Common Concerns After Treatment    Managing Anxiety or Depression:  Referral to support group, e.g. American Cancer Society (www.cancer.org, 410.251.2336), Cancer Support Community (www.wellnessandcancer.org,  136.192.5656)    Referral to a community provider for cognitive behavioral therapy or counseling.    Psychiatric care or counseling and/or initiation of pharmacotherapy are available at the [facility] . Please call [phone] or talk to a member of your treatment team about a referral.    For anonymous information, resource requests or support you can also call the 24-hour Crisis and Information Center at [phone]      Fear of Cancer Coming Back:  Patients need to know that these feelings are normal, and they won’t cause the cancer to come back.    • Referral for cognitive behavioral therapy or counseling    • Referral to support group, e.g. American Cancer Society (www.cancer.org, 388.350.6408), Cancer Support Community (www.wellnessandcancer.org, 556.895.5974)      Financial Concerns:  The financial challenges that people with cancer and their families face are very real.     For hospital bills, you may want to talk with a hospital financial counselor. You may be able to work out a monthly payment plan or even get a reduced rate. You may also want to stay in touch with your insurance company to make sure costs are covered.    For information about resources that are available you can go to the NCI database, “Organizations That Offer Support Services,” at http://www.cancer.gov, search terms “financial assistance.”     Or call the NCI toll-free 2-350-4-CANCER (1-337.647.4508) to ask for help.      Fatigue:  Fatigue is one of the most common problems in patients with cancer. It may be related to the disease itself or cancer treatment and may continue beyond completion of treatment among long-term cancer survivors. Among people with cancer, 80% to 100% report fatigue.     Fatigue may be an isolated problem or occur as one part of a cluster of symptoms, such as pain, depression, dyspnea, anorexia, and sleep problems.    If you are taking an immunotherapy, fatigue  may be a symptom of an endocrine disorder secondary to immunotherapy, such as thyroid or pituitary disorder, not just general cancer-related fatigue. It is important to report any changes to your doctor (ONS, 2017)    Regular physical activity (goal of 150 minutes of activity per week) is the most important thing you can do to manage fatigue. Other treatments that are likely to be helpful include:    · Progressive muscle relaxation and/or relaxation breathing with or without imagery or distraction  · Cognitive behavioral therapy for sleep  · Yoga  · Meditation, mindfulness based stress reduction, and cognitive behavioral stress management  · Cognitive behavioral therapy for fatigue, depression, and pain with or without hypnosis  · Evaluation for other causes of fatigue including hypothyroidism, anemia and depression      Managing Insomnia  · Mindfulness based stress reduction  · Practice good sleep hygiene (reduce/eliminate TV and electronic device screen time one hour before bed)  · Avoid stimulants like caffeine and nicotine close to bedtime  · Avoid alcohol close to bedtime. While alcohol is well-known to help you fall asleep faster, too much close to bedtime can disrupt sleep in the second half of the night as the body begins to process the alcohol.     · Evaluation and management of other symptoms (hot flashes, anxiety, and pain)  · Regular physical activity (goal of 150 minutes of activity per week)  · The supplement melatonin may help with sleep disruption    More information at www.sleepfoundation.org      Concerns about Sexuality, Intimacy and Body Image  · Ask your treatment team about referrals to counseling and/or support groups to address body image concerns.  · Non-hormonal remedies for vaginal dryness for sexual activity include water-based vaginal lubricants (Astroglide, [place] Jelly). Avoid products with spermicides or additives as they can be irritating.  · Some people use a non-hormonal vaginal  "moisturizer 1-3 times per week to improve general comfort (Replens, coconut oil).  · Discuss pain with intercourse or sexual activity with your doctor.    Look Good Feel Better is a non-medical, brand-neutral public service program that teaches beauty techniques to people with cancer to help them manage the appearance-related side effects of cancer treatment. The program includes lessons on skin and nail care, cosmetics, wigs and turbans, accessories and styling, helping people with cancer to find some normalcy in a life that is by no means normal.    For more information and helpful videos visit:  http://lookgoodfeelbetter.org/         About Lymphedema:  According to the [employer], anywhere from 5-17% of women who have sentinel lymph node biopsy develops lymphedema. Among women who have axillary lymph node dissection, the percentage is higher - from 20-53% - and risk increases with the number of nodes taken out.     · Referral to lymphedema physical therapy specialist for lymphatic massage or proper fitting of compression garments or bandages  · Weight training and regular exercise  · Achieve and maintain a healthy weight    Tips to reduce the risk of injury or infection to the arm:  · Treat infections of the at-risk arm and hand right away.  · Wear gloves when doing house or garden work.  · Keep skin clean and well-moisturized.  · Use the arm not at risk when having blood drawn, getting injections or having blood pressure taken.  · Avoid sunburn and excess heat from saunas, hot baths, tanning and other sources.  · Don't cut nail cuticles.  · Use insect repellant when outdoors.  · Avoid injuries, including scratches and bruises, to the at-risk arm.  · Rest the at-risk arm in an elevated position (above the heart or shoulder).     If you have an infection, injury or any of the symptoms listed above, see your health care provider.          Changes in Memory or \"Brain Fog\"  Patients should know that " "25% of cancer patients have cognitive dysfunction (changes in thinking or memory) after treatment and it usually gets better over time    Some things you can do to manage \"brain fog\" or memory problems include:  · Mental exercises (e.g., word games, crossword puzzles)   · Setting up and following routines  · Repeating information  · Keeping a memory journal  · Avoiding multitasking  · Exercising  · Maintaining a healthy diet.   · There is some evidence that Group Cognitive Training is also effective  · Rule out depression, sleep disturbance      Prevention and Wellness:  · Achieve and maintain a healthy body weight as weight gain is a risk factor for development or recurrence of some cancers (BMI between 20-25m2).  · Current Body mass index is 23.26 kg/m².  · Regular physical activity (preferably daily). Strive for at least 150 minutes of moderate or 75 minutes of vigorous activity per week.  · Maintain a diet high in vegetables, fruits and whole grains and low in sugars and fats. Limit red meat and avoid processed foods.  · Avoid all tobacco and second hand smoke.  · Avoid all alcohol intake.  · If you do drink, minimize alcohol exposure - no more than one drink in a day for women and two drinks in a day for men.  · Continue all standard non-cancer related healthcare with your primary care provider. Any new, unusual, and/or persistent symptoms should be brought to the attention of your provider.          Surveillance    How Frequent?    Medical Oncology visits 1 - 4 times per year as clinically appropriate for 5 years, then annually      Lab tests As directed    Imaging exams      Mammography Annual clinical breast exam needed    Schedule a mammogram one year after the first mammogram that led to diagnosis, but no earlier than six months after radiation therapy. Obtain a mammogram every six to 12 months thereafter.      Lymphedema Monitor for lymphedema    Genetic Counseling Periodic screening for changes in family " history and referral to genetic counseling as indicated      Gynecologic assessment Pap smear: Beginning at age 30, every three years if the last three PAP tests in a row were normal. Every year if the last PAP was abnormal. every 3 years as long as the last 3 were normal, every year if abnormal.     Women age 70 and older who have had 3 or more normal PAP tests in a row, and no abnormal PAP tests results in the last 10 years, may choose to stop having PAP tests.    Pelvic exam:  Continue to visit a gynecologist annually.      Bone Density study Bone densitometry (DEXA scan) every 1 to 2 years after initiation of aromatase inhibitor (if applicable) and/or at age 65 or older.    Calcium and Vitamin D.    Weight-bearing exercise most days of the week e.g. moderate intensity walking (can have a conversation but cannot sing).    Avoid all smoke and second hand smoke.     Bisphosphonate, if indicated by your doctor based on bone densitometry (DEXA scan) results.      Immunizations       Influenza       Herpes Zoster       Pneumococcal Yearly  Once  As appropriate    Tobacco Cessation The patient is not currently a tobacco user.  Counseling given: Not Answered

## 2020-08-04 ENCOUNTER — LAB (OUTPATIENT)
Dept: LAB | Facility: HOSPITAL | Age: 85
End: 2020-08-04

## 2020-08-04 ENCOUNTER — OFFICE VISIT (OUTPATIENT)
Dept: ONCOLOGY | Facility: CLINIC | Age: 85
End: 2020-08-04

## 2020-08-04 VITALS
HEIGHT: 65 IN | BODY MASS INDEX: 23.53 KG/M2 | DIASTOLIC BLOOD PRESSURE: 67 MMHG | SYSTOLIC BLOOD PRESSURE: 155 MMHG | HEART RATE: 65 BPM | OXYGEN SATURATION: 98 % | TEMPERATURE: 96.9 F | RESPIRATION RATE: 16 BRPM | WEIGHT: 141.2 LBS

## 2020-08-04 DIAGNOSIS — Z17.0 MALIGNANT NEOPLASM OF BREAST IN FEMALE, ESTROGEN RECEPTOR POSITIVE, UNSPECIFIED LATERALITY, UNSPECIFIED SITE OF BREAST (HCC): Primary | ICD-10-CM

## 2020-08-04 DIAGNOSIS — C50.919 MALIGNANT NEOPLASM OF BREAST IN FEMALE, ESTROGEN RECEPTOR POSITIVE, UNSPECIFIED LATERALITY, UNSPECIFIED SITE OF BREAST (HCC): Primary | ICD-10-CM

## 2020-08-04 DIAGNOSIS — Z17.0 MALIGNANT NEOPLASM OF BREAST IN FEMALE, ESTROGEN RECEPTOR POSITIVE, UNSPECIFIED LATERALITY, UNSPECIFIED SITE OF BREAST (HCC): ICD-10-CM

## 2020-08-04 DIAGNOSIS — R53.83 OTHER FATIGUE: ICD-10-CM

## 2020-08-04 DIAGNOSIS — M85.80 OSTEOPENIA, UNSPECIFIED LOCATION: ICD-10-CM

## 2020-08-04 DIAGNOSIS — C50.919 MALIGNANT NEOPLASM OF BREAST IN FEMALE, ESTROGEN RECEPTOR POSITIVE, UNSPECIFIED LATERALITY, UNSPECIFIED SITE OF BREAST (HCC): ICD-10-CM

## 2020-08-04 DIAGNOSIS — M85.859 OSTEOPENIA OF HIP, UNSPECIFIED LATERALITY: ICD-10-CM

## 2020-08-04 LAB
ALBUMIN SERPL-MCNC: 4 G/DL (ref 3.5–5.2)
ALBUMIN/GLOB SERPL: 1.4 G/DL (ref 1.1–2.4)
ALP SERPL-CCNC: 56 U/L (ref 38–116)
ALT SERPL W P-5'-P-CCNC: 5 U/L (ref 0–33)
ANION GAP SERPL CALCULATED.3IONS-SCNC: 11 MMOL/L (ref 5–15)
AST SERPL-CCNC: 14 U/L (ref 0–32)
BASOPHILS # BLD AUTO: 0.07 10*3/MM3 (ref 0–0.2)
BASOPHILS NFR BLD AUTO: 1.2 % (ref 0–1.5)
BILIRUB SERPL-MCNC: 0.4 MG/DL (ref 0.2–1.2)
BUN SERPL-MCNC: 19 MG/DL (ref 6–20)
BUN/CREAT SERPL: 20.4 (ref 7.3–30)
CALCIUM SPEC-SCNC: 9.7 MG/DL (ref 8.5–10.2)
CHLORIDE SERPL-SCNC: 104 MMOL/L (ref 98–107)
CO2 SERPL-SCNC: 26 MMOL/L (ref 22–29)
CREAT SERPL-MCNC: 0.93 MG/DL (ref 0.6–1.1)
DEPRECATED RDW RBC AUTO: 46.1 FL (ref 37–54)
EOSINOPHIL # BLD AUTO: 0.19 10*3/MM3 (ref 0–0.4)
EOSINOPHIL NFR BLD AUTO: 3.4 % (ref 0.3–6.2)
ERYTHROCYTE [DISTWIDTH] IN BLOOD BY AUTOMATED COUNT: 12.7 % (ref 12.3–15.4)
GFR SERPL CREATININE-BSD FRML MDRD: 57 ML/MIN/1.73
GLOBULIN UR ELPH-MCNC: 2.8 GM/DL (ref 1.8–3.5)
GLUCOSE SERPL-MCNC: 102 MG/DL (ref 74–124)
HCT VFR BLD AUTO: 42.1 % (ref 34–46.6)
HGB BLD-MCNC: 13.4 G/DL (ref 12–15.9)
IMM GRANULOCYTES # BLD AUTO: 0.01 10*3/MM3 (ref 0–0.05)
IMM GRANULOCYTES NFR BLD AUTO: 0.2 % (ref 0–0.5)
LYMPHOCYTES # BLD AUTO: 1.41 10*3/MM3 (ref 0.7–3.1)
LYMPHOCYTES NFR BLD AUTO: 25.1 % (ref 19.6–45.3)
MCH RBC QN AUTO: 31.1 PG (ref 26.6–33)
MCHC RBC AUTO-ENTMCNC: 31.8 G/DL (ref 31.5–35.7)
MCV RBC AUTO: 97.7 FL (ref 79–97)
MONOCYTES # BLD AUTO: 0.52 10*3/MM3 (ref 0.1–0.9)
MONOCYTES NFR BLD AUTO: 9.3 % (ref 5–12)
NEUTROPHILS NFR BLD AUTO: 3.41 10*3/MM3 (ref 1.7–7)
NEUTROPHILS NFR BLD AUTO: 60.8 % (ref 42.7–76)
NRBC BLD AUTO-RTO: 0 /100 WBC (ref 0–0.2)
PLATELET # BLD AUTO: 270 10*3/MM3 (ref 140–450)
PMV BLD AUTO: 9.6 FL (ref 6–12)
POTASSIUM SERPL-SCNC: 4.3 MMOL/L (ref 3.5–4.7)
PROT SERPL-MCNC: 6.8 G/DL (ref 6.3–8)
RBC # BLD AUTO: 4.31 10*6/MM3 (ref 3.77–5.28)
SODIUM SERPL-SCNC: 141 MMOL/L (ref 134–145)
WBC # BLD AUTO: 5.61 10*3/MM3 (ref 3.4–10.8)

## 2020-08-04 PROCEDURE — 99214 OFFICE O/P EST MOD 30 MIN: CPT | Performed by: INTERNAL MEDICINE

## 2020-08-04 PROCEDURE — 80053 COMPREHEN METABOLIC PANEL: CPT

## 2020-08-04 PROCEDURE — 36415 COLL VENOUS BLD VENIPUNCTURE: CPT

## 2020-08-04 PROCEDURE — 85025 COMPLETE CBC W/AUTO DIFF WBC: CPT

## 2020-08-04 RX ORDER — PHENOL 1.4 %
600 AEROSOL, SPRAY (ML) MUCOUS MEMBRANE DAILY
COMMUNITY

## 2020-08-04 RX ORDER — MELATONIN
1000 DAILY
COMMUNITY

## 2020-08-04 NOTE — PROGRESS NOTES
Subjective       REASON FOR FOLLOW UP:    1. pT1c pNX Invasive Lobular Carcinoma of the Left Breast measuring 20 mm, grade II.  Her tumor was ER+ 96%, MT+ 96%, and HER2/clayton Negative (0) with Ki67 of 3.34%.      2.  Patient was to start Arimidex February 2020 but has not started.  On further discussion she wants to avoid any medications including tamoxifen.    3.  Currently receiving radiation     4.  Osteopenia, had a bone density which I have reviewed with the patient.                               HISTORY OF PRESENT ILLNESS:      Patient is a 87-year-old female with history of T1c NX invasive lobular carcinoma of the left breast, 20 mm, ER MT positive HER-2/clayton negative who was to start Arimidex when I saw her last appointment but patient now refuses to take any medications..  Patient had a bone density as of March 5, 2020 and it is consistent with osteopenia.  She is taking calcium and vitamin D supplements.      She had appointment with Dr. Plasencia today.  She underwent a breast exam in his office and it was negative.  She is currently receiving radiation through Dr. Joiner.    We discussed about aromatase inhibitor and tamoxifen and she refuses any treatments for her breast cancer.    Interval history:    Patient is here for follow-up today.  She denies any complaints.  We again discussed about endocrine therapy with either tamoxifen or aromatase inhibitor.  She refuses any treatment.  She says given her age she would rather hold off any treatment.  Her bone density from March 5, 2020 does show evidence of osteopenia but patient does not want to go on Fosamax or Prolia.  We discussed about it.  Patient is due for screening mammogram in November 2020.      Oncologic history:  The patient is a 87 y.o. year old female who is here for an opinion about the above issue.    She is currently followed by PCP Dr. Yuniel Bonilla.  Patient had never had mammograms since 2013.  In October 2019 she noted indentation of  the skin in the breast.  She did not have pain but she did feel a marble size lump at the time.  She had a screening mammogram in November 2019 which had revealed a 12 mm mass in the middle one-third region of the left breast at 1:00 position 4 cmfn.  She, then, went for a diagnostic mammogram and ultrasound in December.  The mammogram revealed an irregular 15 mm mass in the left breast at the 12:00 position and the ultrasound revealed an irregular [id number] mm mass in the left breast at the 12:00 position 4 cmfn and an oval elongated mass measuring 7x3x5 mm in the left breast at the 1:00 position.  She was referred to Dr. Plasencia who performed an US-guided biopsy which showed Invasive Carcinoma with Ductal and Lobular features, low grade, measuring 15 mm, ER+ 96%, MI+ 96%, HER2/clayton negative (0), Ki67 3%.  The mass at the 1:00 position was a fibroadenoma.  She underwent a left breast lumpectomy on February 5, 2020 which confirmed Invasive Lobular Carcinoma of the Left Breast measuring 20 mm, grade II.  Her tumor was ER+ 96%, MI+ 96%, and HER2/clayton Negative (0).    Her personal history consists of vertigo, skin cancer of right leg in 2008 and of left nose in 2015.  She had a melanoma in 2017.    Her family history consists of her sister who had breast cancer at age 46.  Her maternal grandmother had breast cancer in her 70s.  Her daughter has chronic lymphocytic leukemia.        Details of the diagnostics scans and procedures are as follows:    11/27/19 - Screening Mammogram  Finding 1: There is a new irregular mass measuring 12 mm with spiculated margins seen inthe middle one-third region of the left beast at the 1:00 position located 4 cmfn.    Finding 2: There are vascular calcifications seen in both breasts.  No suspicious masses in the right breast.    12/26/19 - Diagnostic Mammogram  Finding 1: There is an irregular mass measuring 15 mm with spiculated margins in the upper outer region of the left breast 4 cmfn.       Ultrasound:  Finding 1: Irregular hypoechoic mass measuring [id number] mm at 12:00 4cmfn.    Finding 2: There is an oval elongated mass with indistinct margins measuring 7x3x5 mm in the left breast at 1:00, 1.7 cm from the mass at 12:00.    01/09/20 - US-Guided Biopsy  Left Breast 12:00 Position   Invasive Carcinoma with Ductal and Lobular features, low grade, measuring 15 mm, ER+ 96%, WA+ 96%, HER2/clayton negative (0), Ki67 3%    Left Breast 1:00 Position  Fibroadenoma    02/05/20 - Left Breast Lumpectomy   A. Invasive lobular carcinoma:                            1. Invasive carcinoma measures 20 mm x  14 mm x 10 mm.                            2. Overall Bekah grade II (tubular score = 3, nuclear score = 2,                                mitotic score = 1).                            3. No lymphovascular invasion identified.                            4. Microcalcifications are present in the invasive component.               B. No carcinoma in situ component identified.               C. All margins are negative for invasive carcinoma.                    Carcinoma measures 2.0 mm from the closest (Medial) margin of excision.                     All other margins measure at least 4.0 mm from invasive carcinoma including:                            Anterior margin = 5 mm                            Posterior margin = 10 mm                            Superior margin = 20 mm                            Inferior margin =15mm                             Lateral margin = 4 mm                            Medial margin = 2 mm                  D. Rare focus of atypical lobular hyperplasia (ALH) noted, Not present at the margin. Focal fibroadenoma                    involved by invasive lobular carcinoma noted, Not present at the margin.               E. Focal biopsy site changes are identified.               F. Focal atypical ductal hyperplasia noted, not present at the margin.               G. Non-neoplastic breast tissue  with intraductal papilloma, adenosis and fibrocystic change noted.                    Microcalcification noted in benign breast tissue.               H. Previous Biomarkers: Estrogen receptors: positive (95.53%), Progesterone receptors: positive (96.15%),                    HER/2-clayton: negative (0), Ki-67 = 3.34%    TUMOR   Tumor Site  Upper outer quadrant    Histologic Type  Invasive lobular carcinoma    Glandular (Acinar) / Tubular Differentiation  Score 3    Nuclear Pleomorphism  Score 2    Mitotic Rate  Score 1    Overall Grade  Grade 2 (scores of 6 or 7)    Tumor Size  Greatest dimension of largest invasive focus (Millimeters): 20 mm   Additional Dimension (Millimeters)  14 mm     10 mm   Tumor Focality  Single focus of invasive carcinoma    Ductal Carcinoma In Situ (DCIS)  Not identified    Lobular Carcinoma In Situ (LCIS)  No LCIS in specimen    Tumor Extent     Lymphovascular Invasion  Not identified    Dermal Lymphovascular Invasion  No skin present    Microcalcifications  Present in invasive carcinoma      Present in non-neoplastic tissue    Treatment Effect  No known presurgical therapy    MARGINS   Invasive Carcinoma Margins  Uninvolved by invasive carcinoma    Distance from Closest Margin (Millimeters)  5 mm   Closest Margin  Anterior    Distance from Other Margins     Anterior Margin (Millimeters)  5 mm   Posterior Margin (Millimeters)  10 mm   Superior Margin (Millimeters)  20 mm   Inferior Margin (Millimeters)  15 mm   Medial Margin (Millimeters)  8 mm   Lateral Margin (Millimeters)  10 mm   LYMPH NODES   Regional Lymph Nodes  No lymph nodes submitted or found    PATHOLOGIC STAGE CLASSIFICATION (pTNM, AJCC 8th Edition)   Primary Tumor (pT)  pT1c    Regional Lymph Nodes (pN)     Category (pN)  pNX    SPECIAL STUDIES   Breast Biomarker Testing Performed on Previous Biopsy     Estrogen Receptor (ER)  Positive (percentage): 95.5 %   Breast Biomarker Testing Performed on Previous Biopsy     Progesterone  "Receptor (PgR)  Positive (percentage): 96.2 %   Breast Biomarker Testing Performed on Previous Biopsy     HER2 (by immunohistochemistry)  Negative (Score 0)    Testing Performed on Case Number  [id number] (Not reviewed)    Comment(s)   Comment(s)  Ki67= 3.34%      Patient refused endocrine therapy    Currently on radiation      OB-GYN:  Menarche- 13  Menopause- 50  Pregnancies-  A0  Post menopausal HRT- Premarin for two years.  Hx of birth control pills- None    SOCIAL HISTORY:  Patient is retired and is .  She is a former smoker and she quit in .  She doesn't drink alcohol.    Past Medical History:   Diagnosis Date   • Anxiety    • Arthritis    • Cancer (CMS/HCC) 2020    left breast   • Coronary artery disease     \"BLOCKAGE IN MY LEGS\"   • Hemorrhoids    • History of basal cell cancer    • History of headaches    • Hypertension    • Hypothyroidism    • Melanoma (CMS/HCC)     LEFT LEG   • PAD (peripheral artery disease) (CMS/HCC)    • Skin cancer     left nose   • Skin cancer     right leg   • Vertigo         Past Surgical History:   Procedure Laterality Date   • BREAST LUMPECTOMY WITH SENTINEL NODE BIOPSY Left 2020    Procedure: BREAST LUMPECTOMY WITH NEEDLE LOCALIZATION;  Surgeon: Kush Plasencia MD;  Location: [facility];  Service: General;  Laterality: Left;   • BREAST SURGERY Left     BIOPSY   • COLONOSCOPY      ALL OK   • COSMETIC SURGERY      AFTER SKIN EXCISION FROM NOSE   • ENDOSCOPY     • EYE SURGERY Bilateral     CATARACTS   • LEG SURGERY Left     MELANOMA EXCISION-WITH SKIN GRAFT   • SKIN BIOPSY     • SKIN CANCER EXCISION Bilateral     NOSE, UPPER LIP   • SKIN GRAFT Bilateral    • VASCULAR SURGERY Bilateral     STENTS IN BOTH UPPER LEGS        Current Outpatient Medications on File Prior to Visit   Medication Sig Dispense Refill   • acetaminophen (TYLENOL) 500 MG tablet Take 1,000 mg by mouth Every 6 (Six) Hours As Needed for Mild Pain .     • " amLODIPine (NORVASC) 2.5 MG tablet Take 1 tablet by mouth Daily. 90 tablet 1   • calcium carbonate (OS-PAUL) 600 MG tablet Take 600 mg by mouth Daily.     • cholecalciferol (VITAMIN D3) 25 MCG (1000 UT) tablet Take 1,000 Units by mouth Daily.     • levothyroxine (SYNTHROID, LEVOTHROID) 75 MCG tablet Take 1 tablet by mouth Daily. 90 tablet 3   • meclizine (ANTIVERT) 12.5 MG tablet Take 12.5 mg by mouth As Needed for Dizziness.       No current facility-administered medications on file prior to visit.         ALLERGIES:    Allergies   Allergen Reactions   • Codeine Mental Status Change        Social History     Socioeconomic History   • Marital status:      Spouse name: Vladimir   • Number of children: 4   • Years of education: Not on file   • Highest education level: Not on file   Occupational History     Employer: RETIRED   Tobacco Use   • Smoking status: Former Smoker     Last attempt to quit: 1970     Years since quittin.6   • Smokeless tobacco: Never Used   Substance and Sexual Activity   • Alcohol use: Never     Frequency: Never   • Drug use: Never   • Sexual activity: Defer        Family History   Problem Relation Age of Onset   • Heart failure Father    • Skin cancer Father    • Heart disease Father    • Breast cancer Sister 46   • Heart disease Sister    • Esophageal cancer Brother    • Heart disease Brother    • Leukemia Daughter    • Breast cancer Maternal Grandmother         70s   • Colon cancer Maternal Grandmother         Review of Systems   Constitutional: Negative.  Negative for appetite change, chills, diaphoresis, fatigue, fever and unexpected weight change.   HENT: Negative for hearing loss, sore throat and trouble swallowing.    Eyes: Negative.    Respiratory: Negative.  Negative for cough, chest tightness, shortness of breath and wheezing.    Cardiovascular: Negative.  Negative for chest pain, palpitations and leg swelling.   Gastrointestinal: Negative.  Negative for abdominal distention,  "abdominal pain, constipation, diarrhea, nausea and vomiting.   Endocrine: Negative.    Genitourinary: Negative.  Negative for dysuria, frequency, hematuria and urgency.   Musculoskeletal: Negative.  Negative for joint swelling.        No muscle weakness.   Skin: Negative.  Negative for rash and wound.   Allergic/Immunologic: Negative.    Neurological: Positive for dizziness (08/04/20-Unchanged) and headaches (08/04/20-Unchanged). Negative for seizures, syncope, speech difficulty, weakness and numbness.   Hematological: Negative.  Negative for adenopathy. Does not bruise/bleed easily.   Psychiatric/Behavioral: Negative.  Negative for behavioral problems, confusion and suicidal ideas.   Patient has vertigo stable  I have reviewed and confirmed the accuracy of the ROS as documented by the MA/LPN/RN Sowmya Faulkner MD        Objective     Vitals:    08/04/20 1536   BP: 155/67   Pulse: 65   Resp: 16   Temp: 96.9 °F (36.1 °C)   TempSrc: Skin   SpO2: 98%   Weight: 64 kg (141 lb 3.2 oz)   Height: 165.1 cm (65\")   PainSc: 0-No pain     Current Status 8/4/2020   ECOG score 0       Physical Exam   Constitutional: She is oriented to person, place, and time. She appears well-developed and well-nourished.   HENT:   Head: Normocephalic and atraumatic.   Mouth/Throat: Mucous membranes are normal. Normal dentition.   Eyes: Pupils are equal, round, and reactive to light. EOM and lids are normal.   Neck: Trachea normal and normal range of motion. Neck supple. No tracheal deviation present. No thyroid mass and no thyromegaly present.   Cardiovascular: Normal rate and regular rhythm. Exam reveals no gallop and no friction rub.   No murmur heard.  Pulmonary/Chest: Effort normal and breath sounds normal. No respiratory distress.   Abdominal: Soft. Normal appearance and bowel sounds are normal. She exhibits no distension and no mass. There is no tenderness. There is no rebound and no guarding. No hernia.   Musculoskeletal: Normal range of " motion.    Normal gait and station.  FROMx4.  No digital cyanosis.   Lymphadenopathy:     She has no cervical adenopathy.        Right: No inguinal adenopathy present.        Left: No inguinal adenopathy present.   Neurological: She is alert and oriented to person, place, and time.   Skin: Skin is warm, dry and intact. No lesion and no rash noted.   Psychiatric: She has a normal mood and affect. Thought content normal. Cognition and memory are normal.   Nursing note and vitals reviewed.    BREAST: Right breast: No skin changes, no evidence of breast mass, no nipple discharge, no evidence of any right axillary adenopathy or right supraclavicular adenopathy  Left breast: No evidence of any skin changes, no evidence of any left breast mass and no evidence of left nipple discharge as well as no left axillary adenopathy or left supraclavicular adenopathy.      RECENT LABS:  Hematology WBC   Date Value Ref Range Status   08/04/2020 5.61 3.40 - 10.80 10*3/mm3 Final   09/17/2018 6.22 4.5 - 11.0 10*3/uL Final     RBC   Date Value Ref Range Status   08/04/2020 4.31 3.77 - 5.28 10*6/mm3 Final   09/17/2018 4.34 4.0 - 5.2 10*6/uL Final     Hemoglobin   Date Value Ref Range Status   08/04/2020 13.4 12.0 - 15.9 g/dL Final   09/17/2018 13.2 12.0 - 16.0 g/dL Final     Hematocrit   Date Value Ref Range Status   08/04/2020 42.1 34.0 - 46.6 % Final   09/17/2018 41.3 36.0 - 46.0 % Final     Platelets   Date Value Ref Range Status   08/04/2020 270 140 - 450 10*3/mm3 Final   09/17/2018 457 (H) 140 - 440 10*3/uL Final          Assessment/Plan     1. pT1c pNX Invasive Lobular Carcinoma of the Left Breast measuring 20 mm, grade II.  Her tumor was ER+ 96%, CO+ 96%, and HER2/clayton Negative (0) with Ki67 of 3.34%.    -Screening mammogram, 12 mm mass, spiculated margins left breast, 1 o'clock position, 4 cm from the nipple  -Diagnostic mammogram, irregular 15 mm mass left breast , 4 cm from the nipple  -Ultrasound showed irregular mass 15 x 13 x  13 at 12:00, oval mass with indistinct margin 7 x 3 x 5 mm left breast at 1:00  -Left breast ultrasound-guided biopsy, 12 o'clock position, invasive carcinoma with ductal and lobular features  -left breast C, 1 o'clock position, fibroadenoma  -February 5, 2020, left breast lumpectomy, invasive lobular carcinoma, 20 x 14 x 10 mm, grade 2, Bekah score of 5 no lymphovascular space invasion, no evidence of DCIS, margins are all negative, rare focus of atypical lobular hyperplasia, not present at margin,    · I had a lengthy discussion with the patient and family that at the age of 86 years given that this is highly ER KS positive tumor, HER-2 negative, she is not a candidate for any chemotherapy given her age.  I do not see any need to obtain an Oncotype DX in the setting.  She is certainly a candidate for endocrine therapy.  My question would be if sentinel lymph node has to be considered given that she had a 20 mm invasive lobular carcinoma.  However patient is being seen by radiation oncology and will get input from them as well to see if axilla would be included.  Will discuss with Dr. Plasencia.  · Patient to consult radiation oncology for opinion on 03/03/20.  Given patient's cancer was ER/KS+, she will certainly benefit from endocrine therapy.  Given her past medical history and current health, I think Arimidex would be the best choice of treatment.  Patient has refused to undergo any endocrine therapy after explaining the details of the side effects.  She is to follow-up with radiation oncology and so far 17 out of 21 treatments given  Patient has refused endocrine therapy even after discussing in length.    2. Family history of malignancy.  Her family history consists of her sister who had breast cancer at age 46.  Her maternal grandmother had breast cancer in her 70s.  Her daughter has chronic lymphocytic leukemia.  · Genetic testing could be considered to see if that information benefits the rest of the  family.        3. History of skin cancer in the right leg (2008) and left nose (2015).    4. History of Melanoma of the left leg (2017).    5. Vertigo.  Patient reports feeling dizzy constantly.    6. Osteopenia: Patient to take calcium and vitamin D supplements.  Discussed either oral bisphosphonates like Actonel or Fosamax or Prolia injectable but patient refuses  7. Fatigue since radiation likely related to radiation        PLAN:  1. Patient refuses any endocrine therapy  2. Given osteopenia we discussed about consideration of oral bisphosphonates like fast Fosamax and patient refuses  3. Continue calcium and vitamin D supplements  4. Follow-up in 4 months  5. Patient is due for mammogram November 27, 2020  6. DEXA scan due March 2022    Sowmya Faulkner MD  August 4, 2000     Cc:  MD Sima Costa MD Stephen Self, MD Stuart Spalding, MD

## 2020-08-24 ENCOUNTER — NURSE NAVIGATOR (OUTPATIENT)
Dept: OTHER | Facility: HOSPITAL | Age: 85
End: 2020-08-24

## 2020-08-24 NOTE — PROGRESS NOTES
Chart reviewed. Patient saw survivorship APRN July 30th. No further follow up needed from navigational services.

## 2020-11-22 RX ORDER — AMLODIPINE BESYLATE 2.5 MG/1
TABLET ORAL
Qty: 90 TABLET | Refills: 0 | Status: SHIPPED | OUTPATIENT
Start: 2020-11-22 | End: 2021-03-01 | Stop reason: SDUPTHER

## 2020-11-24 ENCOUNTER — LAB (OUTPATIENT)
Dept: LAB | Facility: HOSPITAL | Age: 85
End: 2020-11-24

## 2020-11-24 ENCOUNTER — OFFICE VISIT (OUTPATIENT)
Dept: ONCOLOGY | Facility: CLINIC | Age: 85
End: 2020-11-24

## 2020-11-24 VITALS
DIASTOLIC BLOOD PRESSURE: 68 MMHG | TEMPERATURE: 97.3 F | RESPIRATION RATE: 16 BRPM | OXYGEN SATURATION: 96 % | SYSTOLIC BLOOD PRESSURE: 149 MMHG | HEART RATE: 75 BPM | BODY MASS INDEX: 23.04 KG/M2 | HEIGHT: 65 IN | WEIGHT: 138.3 LBS

## 2020-11-24 DIAGNOSIS — C50.919 MALIGNANT NEOPLASM OF BREAST IN FEMALE, ESTROGEN RECEPTOR POSITIVE, UNSPECIFIED LATERALITY, UNSPECIFIED SITE OF BREAST (HCC): Primary | ICD-10-CM

## 2020-11-24 DIAGNOSIS — Z12.39 ENCOUNTER FOR SCREENING BREAST EXAMINATION: ICD-10-CM

## 2020-11-24 DIAGNOSIS — C50.412 MALIGNANT NEOPLASM OF UPPER-OUTER QUADRANT OF LEFT BREAST IN FEMALE, ESTROGEN RECEPTOR POSITIVE (HCC): ICD-10-CM

## 2020-11-24 DIAGNOSIS — Z17.0 MALIGNANT NEOPLASM OF UPPER-OUTER QUADRANT OF LEFT BREAST IN FEMALE, ESTROGEN RECEPTOR POSITIVE (HCC): ICD-10-CM

## 2020-11-24 DIAGNOSIS — Z12.31 ENCOUNTER FOR SCREENING MAMMOGRAM FOR MALIGNANT NEOPLASM OF BREAST: ICD-10-CM

## 2020-11-24 DIAGNOSIS — Z17.0 MALIGNANT NEOPLASM OF BREAST IN FEMALE, ESTROGEN RECEPTOR POSITIVE, UNSPECIFIED LATERALITY, UNSPECIFIED SITE OF BREAST (HCC): Primary | ICD-10-CM

## 2020-11-24 LAB
ALBUMIN SERPL-MCNC: 4.2 G/DL (ref 3.5–5.2)
ALBUMIN/GLOB SERPL: 1.7 G/DL (ref 1.1–2.4)
ALP SERPL-CCNC: 59 U/L (ref 38–116)
ALT SERPL W P-5'-P-CCNC: 9 U/L (ref 0–33)
ANION GAP SERPL CALCULATED.3IONS-SCNC: 9.8 MMOL/L (ref 5–15)
AST SERPL-CCNC: 19 U/L (ref 0–32)
BASOPHILS # BLD AUTO: 0.08 10*3/MM3 (ref 0–0.2)
BASOPHILS NFR BLD AUTO: 1.6 % (ref 0–1.5)
BILIRUB SERPL-MCNC: 0.5 MG/DL (ref 0.2–1.2)
BUN SERPL-MCNC: 21 MG/DL (ref 6–20)
BUN/CREAT SERPL: 20.4 (ref 7.3–30)
CALCIUM SPEC-SCNC: 9.8 MG/DL (ref 8.5–10.2)
CHLORIDE SERPL-SCNC: 104 MMOL/L (ref 98–107)
CO2 SERPL-SCNC: 28.2 MMOL/L (ref 22–29)
CREAT SERPL-MCNC: 1.03 MG/DL (ref 0.6–1.1)
DEPRECATED RDW RBC AUTO: 45.2 FL (ref 37–54)
EOSINOPHIL # BLD AUTO: 0.16 10*3/MM3 (ref 0–0.4)
EOSINOPHIL NFR BLD AUTO: 3.1 % (ref 0.3–6.2)
ERYTHROCYTE [DISTWIDTH] IN BLOOD BY AUTOMATED COUNT: 13.1 % (ref 12.3–15.4)
GFR SERPL CREATININE-BSD FRML MDRD: 51 ML/MIN/1.73
GLOBULIN UR ELPH-MCNC: 2.5 GM/DL (ref 1.8–3.5)
GLUCOSE SERPL-MCNC: 105 MG/DL (ref 74–124)
HCT VFR BLD AUTO: 41.7 % (ref 34–46.6)
HGB BLD-MCNC: 13.6 G/DL (ref 12–15.9)
IMM GRANULOCYTES # BLD AUTO: 0 10*3/MM3 (ref 0–0.05)
IMM GRANULOCYTES NFR BLD AUTO: 0 % (ref 0–0.5)
LYMPHOCYTES # BLD AUTO: 1.46 10*3/MM3 (ref 0.7–3.1)
LYMPHOCYTES NFR BLD AUTO: 28.6 % (ref 19.6–45.3)
MCH RBC QN AUTO: 30.8 PG (ref 26.6–33)
MCHC RBC AUTO-ENTMCNC: 32.6 G/DL (ref 31.5–35.7)
MCV RBC AUTO: 94.6 FL (ref 79–97)
MONOCYTES # BLD AUTO: 0.48 10*3/MM3 (ref 0.1–0.9)
MONOCYTES NFR BLD AUTO: 9.4 % (ref 5–12)
NEUTROPHILS NFR BLD AUTO: 2.92 10*3/MM3 (ref 1.7–7)
NEUTROPHILS NFR BLD AUTO: 57.3 % (ref 42.7–76)
NRBC BLD AUTO-RTO: 0 /100 WBC (ref 0–0.2)
PLATELET # BLD AUTO: 270 10*3/MM3 (ref 140–450)
PMV BLD AUTO: 9.2 FL (ref 6–12)
POTASSIUM SERPL-SCNC: 4.3 MMOL/L (ref 3.5–4.7)
PROT SERPL-MCNC: 6.7 G/DL (ref 6.3–8)
RBC # BLD AUTO: 4.41 10*6/MM3 (ref 3.77–5.28)
SODIUM SERPL-SCNC: 142 MMOL/L (ref 134–145)
WBC # BLD AUTO: 5.1 10*3/MM3 (ref 3.4–10.8)

## 2020-11-24 PROCEDURE — 80053 COMPREHEN METABOLIC PANEL: CPT

## 2020-11-24 PROCEDURE — 99213 OFFICE O/P EST LOW 20 MIN: CPT | Performed by: INTERNAL MEDICINE

## 2020-11-24 PROCEDURE — 85025 COMPLETE CBC W/AUTO DIFF WBC: CPT

## 2020-11-24 PROCEDURE — 36415 COLL VENOUS BLD VENIPUNCTURE: CPT

## 2020-11-24 NOTE — PROGRESS NOTES
Subjective       REASON FOR FOLLOW UP:    1. pT1c pNX Invasive Lobular Carcinoma of the Left Breast measuring 20 mm, grade II.  Her tumor was ER+ 96%, CT+ 96%, and HER2/clayton Negative (0) with Ki67 of 3.34%.      2.  Patient was to start Arimidex February 2020 but has not started.  On further discussion she wants to avoid any medications including tamoxifen.    3.  Currently receiving radiation     4.  Osteopenia, had a bone density which I have reviewed with the patient.                               HISTORY OF PRESENT ILLNESS:      Patient is a 87-year-old female with history of T1c NX invasive lobular carcinoma of the left breast, 20 mm, ER CT positive HER-2/clayton negative who was to start Arimidex when I saw her last appointment but patient now refuses to take any medications..  Patient had a bone density as of March 5, 2020 and it is consistent with osteopenia.  She is taking calcium and vitamin D supplements.      She had appointment with Dr. Plasencia today.  She underwent a breast exam in his office and it was negative.  She is currently receiving radiation through Dr. Joiner.    We discussed about aromatase inhibitor and tamoxifen and she refuses any treatments for her breast cancer.    Interval history:    Patient denies any complaints.  She was offered endocrine therapy but refused all endocrine therapy.  Currently she is due for mammogram next week.    Oncologic history:  The patient is a 87 y.o. year old female who is here for an opinion about the above issue.    She is currently followed by PCP Dr. Yuniel Bonilla.  Patient had never had mammograms since 2013.  In October 2019 she noted indentation of the skin in the breast.  She did not have pain but she did feel a marble size lump at the time.  She had a screening mammogram in November 2019 which had revealed a 12 mm mass in the middle one-third region of the left breast at 1:00 position 4 cmfn.  She, then, went for a diagnostic mammogram and ultrasound  in December.  The mammogram revealed an irregular 15 mm mass in the left breast at the 12:00 position and the ultrasound revealed an irregular [id number] mm mass in the left breast at the 12:00 position 4 cmfn and an oval elongated mass measuring 7x3x5 mm in the left breast at the 1:00 position.  She was referred to Dr. Plasencia who performed an US-guided biopsy which showed Invasive Carcinoma with Ductal and Lobular features, low grade, measuring 15 mm, ER+ 96%, WV+ 96%, HER2/clayton negative (0), Ki67 3%.  The mass at the 1:00 position was a fibroadenoma.  She underwent a left breast lumpectomy on February 5, 2020 which confirmed Invasive Lobular Carcinoma of the Left Breast measuring 20 mm, grade II.  Her tumor was ER+ 96%, WV+ 96%, and HER2/clayton Negative (0).    Her personal history consists of vertigo, skin cancer of right leg in 2008 and of left nose in 2015.  She had a melanoma in 2017.    Her family history consists of her sister who had breast cancer at age 46.  Her maternal grandmother had breast cancer in her 70s.  Her daughter has chronic lymphocytic leukemia.        Details of the diagnostics scans and procedures are as follows:    11/27/19 - Screening Mammogram  Finding 1: There is a new irregular mass measuring 12 mm with spiculated margins seen inthe middle one-third region of the left beast at the 1:00 position located 4 cmfn.    Finding 2: There are vascular calcifications seen in both breasts.  No suspicious masses in the right breast.    12/26/19 - Diagnostic Mammogram  Finding 1: There is an irregular mass measuring 15 mm with spiculated margins in the upper outer region of the left breast 4 cmfn.      Ultrasound:  Finding 1: Irregular hypoechoic mass measuring [id number] mm at 12:00 4cmfn.    Finding 2: There is an oval elongated mass with indistinct margins measuring 7x3x5 mm in the left breast at 1:00, 1.7 cm from the mass at 12:00.    01/09/20 - US-Guided Biopsy  Left Breast 12:00 Position   Invasive  Carcinoma with Ductal and Lobular features, low grade, measuring 15 mm, ER+ 96%, UT+ 96%, HER2/clayton negative (0), Ki67 3%    Left Breast 1:00 Position  Fibroadenoma    02/05/20 - Left Breast Lumpectomy   A. Invasive lobular carcinoma:                            1. Invasive carcinoma measures 20 mm x  14 mm x 10 mm.                            2. Overall Wilsey grade II (tubular score = 3, nuclear score = 2,                                mitotic score = 1).                            3. No lymphovascular invasion identified.                            4. Microcalcifications are present in the invasive component.               B. No carcinoma in situ component identified.               C. All margins are negative for invasive carcinoma.                    Carcinoma measures 2.0 mm from the closest (Medial) margin of excision.                     All other margins measure at least 4.0 mm from invasive carcinoma including:                            Anterior margin = 5 mm                            Posterior margin = 10 mm                            Superior margin = 20 mm                            Inferior margin =15mm                             Lateral margin = 4 mm                            Medial margin = 2 mm                  D. Rare focus of atypical lobular hyperplasia (ALH) noted, Not present at the margin. Focal fibroadenoma                    involved by invasive lobular carcinoma noted, Not present at the margin.               E. Focal biopsy site changes are identified.               F. Focal atypical ductal hyperplasia noted, not present at the margin.               G. Non-neoplastic breast tissue with intraductal papilloma, adenosis and fibrocystic change noted.                    Microcalcification noted in benign breast tissue.               H. Previous Biomarkers: Estrogen receptors: positive (95.53%), Progesterone receptors: positive (96.15%),                    HER/2-clayton: negative (0), Ki-67 =  3.34%    TUMOR   Tumor Site  Upper outer quadrant    Histologic Type  Invasive lobular carcinoma    Glandular (Acinar) / Tubular Differentiation  Score 3    Nuclear Pleomorphism  Score 2    Mitotic Rate  Score 1    Overall Grade  Grade 2 (scores of 6 or 7)    Tumor Size  Greatest dimension of largest invasive focus (Millimeters): 20 mm   Additional Dimension (Millimeters)  14 mm     10 mm   Tumor Focality  Single focus of invasive carcinoma    Ductal Carcinoma In Situ (DCIS)  Not identified    Lobular Carcinoma In Situ (LCIS)  No LCIS in specimen    Tumor Extent     Lymphovascular Invasion  Not identified    Dermal Lymphovascular Invasion  No skin present    Microcalcifications  Present in invasive carcinoma      Present in non-neoplastic tissue    Treatment Effect  No known presurgical therapy    MARGINS   Invasive Carcinoma Margins  Uninvolved by invasive carcinoma    Distance from Closest Margin (Millimeters)  5 mm   Closest Margin  Anterior    Distance from Other Margins     Anterior Margin (Millimeters)  5 mm   Posterior Margin (Millimeters)  10 mm   Superior Margin (Millimeters)  20 mm   Inferior Margin (Millimeters)  15 mm   Medial Margin (Millimeters)  8 mm   Lateral Margin (Millimeters)  10 mm   LYMPH NODES   Regional Lymph Nodes  No lymph nodes submitted or found    PATHOLOGIC STAGE CLASSIFICATION (pTNM, AJCC 8th Edition)   Primary Tumor (pT)  pT1c    Regional Lymph Nodes (pN)     Category (pN)  pNX    SPECIAL STUDIES   Breast Biomarker Testing Performed on Previous Biopsy     Estrogen Receptor (ER)  Positive (percentage): 95.5 %   Breast Biomarker Testing Performed on Previous Biopsy     Progesterone Receptor (PgR)  Positive (percentage): 96.2 %   Breast Biomarker Testing Performed on Previous Biopsy     HER2 (by immunohistochemistry)  Negative (Score 0)    Testing Performed on Case Number  [id number] (Not reviewed)    Comment(s)   Comment(s)  Ki67= 3.34%      Patient refused endocrine therapy    Currently  "on radiation      OB-GYN:  Menarche- 13  Menopause- 50  Pregnancies-  A0  Post menopausal HRT- Premarin for two years.  Hx of birth control pills- None    SOCIAL HISTORY:  Patient is retired and is .  She is a former smoker and she quit in .  She doesn't drink alcohol.    Past Medical History:   Diagnosis Date   • Anxiety    • Arthritis    • Cancer (CMS/HCC) 2020    left breast   • Coronary artery disease     \"BLOCKAGE IN MY LEGS\"   • Hemorrhoids    • History of basal cell cancer    • History of headaches    • Hypertension    • Hypothyroidism    • Melanoma (CMS/HCC) 2017    LEFT LEG   • PAD (peripheral artery disease) (CMS/HCC)    • Skin cancer     left nose   • Skin cancer     right leg   • Vertigo         Past Surgical History:   Procedure Laterality Date   • BREAST LUMPECTOMY WITH SENTINEL NODE BIOPSY Left 2020    Procedure: BREAST LUMPECTOMY WITH NEEDLE LOCALIZATION;  Surgeon: Kush Plasencia MD;  Location: [facility];  Service: General;  Laterality: Left;   • BREAST SURGERY Left     BIOPSY   • COLONOSCOPY      ALL OK   • COSMETIC SURGERY      AFTER SKIN EXCISION FROM NOSE   • ENDOSCOPY     • EYE SURGERY Bilateral     CATARACTS   • LEG SURGERY Left 2017    MELANOMA EXCISION-WITH SKIN GRAFT   • SKIN BIOPSY     • SKIN CANCER EXCISION Bilateral     NOSE, UPPER LIP   • SKIN GRAFT Bilateral    • VASCULAR SURGERY Bilateral 2018    STENTS IN BOTH UPPER LEGS        Current Outpatient Medications on File Prior to Visit   Medication Sig Dispense Refill   • acetaminophen (TYLENOL) 500 MG tablet Take 1,000 mg by mouth Every 6 (Six) Hours As Needed for Mild Pain .     • amLODIPine (NORVASC) 2.5 MG tablet TAKE ONE TABLET BY MOUTH DAILY 90 tablet 0   • calcium carbonate (OS-PAUL) 600 MG tablet Take 600 mg by mouth Daily.     • cholecalciferol (VITAMIN D3) 25 MCG (1000 UT) tablet Take 1,000 Units by mouth Daily.     • levothyroxine (SYNTHROID, LEVOTHROID) 75 MCG tablet Take 1 " tablet by mouth Daily. 90 tablet 3   • meclizine (ANTIVERT) 12.5 MG tablet Take 12.5 mg by mouth As Needed for Dizziness.       No current facility-administered medications on file prior to visit.         ALLERGIES:    Allergies   Allergen Reactions   • Codeine Mental Status Change        Social History     Socioeconomic History   • Marital status:      Spouse name: Vladimir   • Number of children: 4   • Years of education: Not on file   • Highest education level: Not on file   Occupational History     Employer: RETIRED   Tobacco Use   • Smoking status: Former Smoker     Quit date:      Years since quittin.9   • Smokeless tobacco: Never Used   Substance and Sexual Activity   • Alcohol use: Never     Frequency: Never   • Drug use: Never   • Sexual activity: Defer        Family History   Problem Relation Age of Onset   • Heart failure Father    • Skin cancer Father    • Heart disease Father    • Breast cancer Sister 46   • Heart disease Sister    • Esophageal cancer Brother    • Heart disease Brother    • Leukemia Daughter    • Breast cancer Maternal Grandmother         70s   • Colon cancer Maternal Grandmother         Review of Systems   Constitutional: Negative.  Negative for appetite change, chills, diaphoresis, fatigue, fever and unexpected weight change.   HENT: Negative for hearing loss, sore throat and trouble swallowing.    Eyes: Negative.    Respiratory: Negative.  Negative for cough, chest tightness, shortness of breath and wheezing.    Cardiovascular: Negative.  Negative for chest pain, palpitations and leg swelling.   Gastrointestinal: Negative.  Negative for abdominal distention, abdominal pain, constipation, diarrhea, nausea and vomiting.   Endocrine: Negative.    Genitourinary: Negative.  Negative for dysuria, frequency, hematuria and urgency.   Musculoskeletal: Negative.  Negative for joint swelling.        No muscle weakness.   Skin: Negative.  Negative for rash and wound.  "  Allergic/Immunologic: Negative.    Neurological: Positive for dizziness (11/24/20-Unchanged) and headaches (11/24/20-Unchanged). Negative for seizures, syncope, speech difficulty, weakness and numbness.   Hematological: Negative.  Negative for adenopathy. Does not bruise/bleed easily.   Psychiatric/Behavioral: Negative.  Negative for behavioral problems, confusion and suicidal ideas.   All other systems reviewed and are negative.  Patient has vertigo stable  I have reviewed and confirmed the accuracy of the ROS as documented by the MA/PHANI/MARGOTH Faulkner MD    I have reviewed and confirmed the accuracy of the ROS as documented by the MA/PHANI/MARGOTH Faulkner MD        Objective     Vitals:    11/24/20 1410   BP: 149/68   Pulse: 75   Resp: 16   Temp: 97.3 °F (36.3 °C)   TempSrc: Skin   SpO2: 96%   Weight: 62.7 kg (138 lb 4.8 oz)   Height: 165.1 cm (65\")   PainSc: 0-No pain     Current Status 11/24/2020   ECOG score 0       Physical Exam   Constitutional: She is oriented to person, place, and time. She appears well-developed.   HENT:   Head: Normocephalic and atraumatic.   Mouth/Throat: Normal dentition.   Eyes: Pupils are equal, round, and reactive to light. Lids are normal.   Neck: Trachea normal and normal range of motion. Neck supple. No tracheal deviation present. No thyroid mass and no thyromegaly present.   Cardiovascular: Normal rate and regular rhythm. Exam reveals no gallop and no friction rub.   No murmur heard.  Pulmonary/Chest: Effort normal and breath sounds normal. No respiratory distress.   Abdominal: Soft. Normal appearance and bowel sounds are normal. She exhibits no distension and no mass. There is no abdominal tenderness. There is no rebound and no guarding. No hernia.   Musculoskeletal: Normal range of motion.      Comments:  Normal gait and station.  FROMx4.  No digital cyanosis.   Lymphadenopathy:     She has no cervical adenopathy. No inguinal adenopathy noted on the right or left side. "   Neurological: She is alert and oriented to person, place, and time.   Skin: Skin is warm and dry. No lesion and no rash noted.   Psychiatric: Thought content normal.   Nursing note and vitals reviewed.    BREAST: Right breast: No skin changes, no evidence of breast mass, no nipple discharge, no evidence of any right axillary adenopathy or right supraclavicular adenopathy  Left breast: No evidence of any skin changes, no evidence of any left breast mass and no evidence of left nipple discharge as well as no left axillary adenopathy or left supraclavicular adenopathy.  I have reexamined the patient and the results are consistent with the previously documented exam. Sowmya Faulkner MD       RECENT LABS:  Hematology WBC   Date Value Ref Range Status   11/24/2020 5.10 3.40 - 10.80 10*3/mm3 Final   09/17/2018 6.22 4.5 - 11.0 10*3/uL Final     RBC   Date Value Ref Range Status   11/24/2020 4.41 3.77 - 5.28 10*6/mm3 Final   09/17/2018 4.34 4.0 - 5.2 10*6/uL Final     Hemoglobin   Date Value Ref Range Status   11/24/2020 13.6 12.0 - 15.9 g/dL Final   09/17/2018 13.2 12.0 - 16.0 g/dL Final     Hematocrit   Date Value Ref Range Status   11/24/2020 41.7 34.0 - 46.6 % Final   09/17/2018 41.3 36.0 - 46.0 % Final     Platelets   Date Value Ref Range Status   11/24/2020 270 140 - 450 10*3/mm3 Final   09/17/2018 457 (H) 140 - 440 10*3/uL Final          Assessment/Plan     1. pT1c pNX Invasive Lobular Carcinoma of the Left Breast measuring 20 mm, grade II.  Her tumor was ER+ 96%, MA+ 96%, and HER2/clayton Negative (0) with Ki67 of 3.34%.    -Screening mammogram, 12 mm mass, spiculated margins left breast, 1 o'clock position, 4 cm from the nipple  -Diagnostic mammogram, irregular 15 mm mass left breast , 4 cm from the nipple  -Ultrasound showed irregular mass 15 x 13 x 13 at 12:00, oval mass with indistinct margin 7 x 3 x 5 mm left breast at 1:00  -Left breast ultrasound-guided biopsy, 12 o'clock position, invasive carcinoma with ductal  and lobular features  -left breast C, 1 o'clock position, fibroadenoma  -February 5, 2020, left breast lumpectomy, invasive lobular carcinoma, 20 x 14 x 10 mm, grade 2, Bekah score of 5 no lymphovascular space invasion, no evidence of DCIS, margins are all negative, rare focus of atypical lobular hyperplasia, not present at margin,    · I had a lengthy discussion with the patient and family that at the age of 86 years given that this is highly ER WY positive tumor, HER-2 negative, she is not a candidate for any chemotherapy given her age.  I do not see any need to obtain an Oncotype DX in the setting.  She is certainly a candidate for endocrine therapy.  My question would be if sentinel lymph node has to be considered given that she had a 20 mm invasive lobular carcinoma.  However patient is being seen by radiation oncology and will get input from them as well to see if axilla would be included.  Will discuss with Dr. Plasencia.  · Patient to consult radiation oncology for opinion on 03/03/20.  Given patient's cancer was ER/WY+, she will certainly benefit from endocrine therapy.  Given her past medical history and current health, I think Arimidex would be the best choice of treatment.  Patient has refused to undergo any endocrine therapy after explaining the details of the side effects.  She is to follow-up with radiation oncology and so far 17 out of 21 treatments given  Patient has refused endocrine therapy even after discussing in length.    2. Family history of malignancy.  Her family history consists of her sister who had breast cancer at age 46.  Her maternal grandmother had breast cancer in her 70s.  Her daughter has chronic lymphocytic leukemia.  · Genetic testing could be considered to see if that information benefits the rest of the family.        3. History of skin cancer in the right leg (2008) and left nose (2015).    4. History of Melanoma of the left leg (2017).    5. Vertigo.  Patient reports  feeling dizzy constantly.    6. Osteopenia: Patient to take calcium and vitamin D supplements.  Discussed either oral bisphosphonates like Actonel or Fosamax or Prolia injectable but patient refuses  7. Fatigue since radiation likely related to radiation        PLAN:  1. Patient refuses any endocrine therapy  2. Given osteopenia we discussed about consideration of oral bisphosphonates like fast Fosamax and patient refuses  3. Continue calcium and vitamin D supplements  4. Schedule screening mammogram next week, will communicate results on the phone  5. DEXA scan is due March 2022  6. Follow-up in 6 months with labs    Sowmya Faulkner MD       Cc:  MD Sima Costa MD Stephen Self, MD Stuart Spalding, MD

## 2020-12-02 ENCOUNTER — APPOINTMENT (OUTPATIENT)
Dept: WOMENS IMAGING | Facility: HOSPITAL | Age: 85
End: 2020-12-02

## 2020-12-02 PROCEDURE — 77067 SCR MAMMO BI INCL CAD: CPT | Performed by: RADIOLOGY

## 2020-12-02 PROCEDURE — 77063 BREAST TOMOSYNTHESIS BI: CPT | Performed by: RADIOLOGY

## 2021-02-22 RX ORDER — AMLODIPINE BESYLATE 2.5 MG/1
TABLET ORAL
Qty: 90 TABLET | Refills: 0 | OUTPATIENT
Start: 2021-02-22

## 2021-02-25 RX ORDER — AMLODIPINE BESYLATE 2.5 MG/1
TABLET ORAL
Qty: 90 TABLET | Refills: 0 | OUTPATIENT
Start: 2021-02-25

## 2021-03-01 RX ORDER — AMLODIPINE BESYLATE 2.5 MG/1
2.5 TABLET ORAL DAILY
Qty: 30 TABLET | Refills: 5 | Status: SHIPPED | OUTPATIENT
Start: 2021-03-01 | End: 2021-04-08 | Stop reason: SDUPTHER

## 2021-03-01 RX ORDER — AMLODIPINE BESYLATE 2.5 MG/1
TABLET ORAL
Qty: 90 TABLET | Refills: 0 | OUTPATIENT
Start: 2021-03-01

## 2021-03-01 NOTE — TELEPHONE ENCOUNTER
Caller: Mela Muse    Relationship: Self    Best call back number: [phone]     Medication needed:   Requested Prescriptions     Pending Prescriptions Disp Refills   • amLODIPine (NORVASC) 2.5 MG tablet 90 tablet 0     Sig: Take 1 tablet by mouth Daily.       When do you need the refill by: TODAY    What details did the patient provide when requesting the medication:   PATIENT WAS SCHEDULED FOR FIRST AVAILABLE APTT WITH DR FAYE (04/08/21)..   CAN WE PLEASE REFILL THIS FOR THE PATIENT TODAY SHES COMPLETELY OUT.    Does the patient have less than a 3 day supply:  [x] Yes  [] No    What is the patient's preferred pharmacy:          [address] [facility] [address]  - [phone] [facility] [phone]   [phone]

## 2021-03-09 DIAGNOSIS — Z23 IMMUNIZATION DUE: ICD-10-CM

## 2021-04-08 ENCOUNTER — OFFICE VISIT (OUTPATIENT)
Dept: FAMILY MEDICINE CLINIC | Facility: CLINIC | Age: 86
End: 2021-04-08

## 2021-04-08 VITALS
HEART RATE: 81 BPM | TEMPERATURE: 97.3 F | HEIGHT: 65 IN | SYSTOLIC BLOOD PRESSURE: 156 MMHG | BODY MASS INDEX: 23.16 KG/M2 | WEIGHT: 139 LBS | DIASTOLIC BLOOD PRESSURE: 78 MMHG | OXYGEN SATURATION: 96 %

## 2021-04-08 DIAGNOSIS — Z00.00 MEDICARE ANNUAL WELLNESS VISIT, SUBSEQUENT: Primary | ICD-10-CM

## 2021-04-08 DIAGNOSIS — I10 ESSENTIAL HYPERTENSION: ICD-10-CM

## 2021-04-08 DIAGNOSIS — R07.9 LEFT-SIDED CHEST PAIN: ICD-10-CM

## 2021-04-08 DIAGNOSIS — E03.9 ACQUIRED HYPOTHYROIDISM: ICD-10-CM

## 2021-04-08 PROCEDURE — G0009 ADMIN PNEUMOCOCCAL VACCINE: HCPCS | Performed by: FAMILY MEDICINE

## 2021-04-08 PROCEDURE — G0439 PPPS, SUBSEQ VISIT: HCPCS | Performed by: FAMILY MEDICINE

## 2021-04-08 PROCEDURE — 99214 OFFICE O/P EST MOD 30 MIN: CPT | Performed by: FAMILY MEDICINE

## 2021-04-08 PROCEDURE — 90732 PPSV23 VACC 2 YRS+ SUBQ/IM: CPT | Performed by: FAMILY MEDICINE

## 2021-04-08 RX ORDER — AMLODIPINE BESYLATE 5 MG/1
TABLET ORAL
Qty: 90 TABLET | Refills: 3 | Status: SHIPPED | OUTPATIENT
Start: 2021-04-08 | End: 2022-03-10 | Stop reason: SDUPTHER

## 2021-04-08 RX ORDER — LEVOTHYROXINE SODIUM 0.07 MG/1
TABLET ORAL
Qty: 78 TABLET | Refills: 3 | Status: SHIPPED | OUTPATIENT
Start: 2021-04-08 | End: 2021-09-02 | Stop reason: SDUPTHER

## 2021-04-08 NOTE — PROGRESS NOTES
The ABCs of the Annual Wellness Visit  Subsequent Medicare Wellness Visit    Chief Complaint   Patient presents with   • Hypertension       Subjective   History of Present Illness:  Mela Muse is a 88 y.o. female who presents for a Subsequent Medicare Wellness Visit.  F/U HTN.  No orthostasis.  Doing well with meds.  NO SE.    F/U PVD.  Pain in calves with water aerobics or walking more than 0.5 miles.    F/u hypothyroidism.  On levothyroxine 75 6 days a week.     C/o felling on L side of chest.  There all the time.  Had radiation in the past.   Not worse with exercise.  Sometimes worse with breathing.  Going on months.    HEALTH RISK ASSESSMENT    Recent Hospitalizations:  No hospitalization(s) within the last year.    Current Medical Providers:  Patient Care Team:  Yuniel Bonilla MD as PCP - General  Yuniel Bonilla MD as PCP - Family Medicine  Self, Khoa Quick MD (Vascular Surgery)  Sima Joiner MD as Consulting Physician (Radiation Oncology)  Sowmya Faulkner MD as Consulting Physician (Hematology and Oncology)  Kush Plasencia MD as Referring Physician (Breast Surgery)    Smoking Status:  Social History     Tobacco Use   Smoking Status Former Smoker   • Quit date:    • Years since quittin.3   Smokeless Tobacco Never Used       Alcohol Consumption:  Social History     Substance and Sexual Activity   Alcohol Use Never       Depression Screen:   PHQ-2/PHQ-9 Depression Screening 2020   Little interest or pleasure in doing things 0   Feeling down, depressed, or hopeless 0   Trouble falling or staying asleep, or sleeping too much 0   Feeling tired or having little energy 2   Poor appetite or overeating 0   Feeling bad about yourself - or that you are a failure or have let yourself or your family down 0   Trouble concentrating on things, such as reading the newspaper or watching television 0   Moving or speaking so slowly that other people could have noticed. Or the  opposite - being so fidgety or restless that you have been moving around a lot more than usual 2   Thoughts that you would be better off dead, or of hurting yourself in some way 0   Total Score 4   If you checked off any problems, how difficult have these problems made it for you to do your work, take care of things at home, or get along with other people? -       Fall Risk Screen:  NATANAEL Fall Risk Assessment has not been completed.    Health Habits and Functional and Cognitive Screening:  Functional & Cognitive Status 4/8/2021   Do you have difficulty preparing food and eating? No   Do you have difficulty bathing yourself, getting dressed or grooming yourself? No   Do you have difficulty using the toilet? No   Do you have difficulty moving around from place to place? No   Do you have trouble with steps or getting out of a bed or a chair? No   Current Diet Well Balanced Diet   Dental Exam Up to date   Eye Exam Up to date   Exercise (times per week) 5 times per week   Current Exercises Include Weightlifting   Current Exercise Activities Include -   Do you need help using the phone?  No   Are you deaf or do you have serious difficulty hearing?  No   Do you need help with transportation? No   Do you need help shopping? No   Do you need help preparing meals?  No   Do you need help with housework?  No   Do you need help with laundry? No   Do you need help taking your medications? No   Do you need help managing money? No   Do you ever drive or ride in a car without wearing a seat belt? No   Have you felt unusual stress, anger or loneliness in the last month? No   Who do you live with? Spouse   If you need help, do you have trouble finding someone available to you? No   Have you been bothered in the last four weeks by sexual problems? No   Do you have difficulty concentrating, remembering or making decisions? No         Does the patient have evidence of cognitive impairment? No    Asprin use counseling:Does not need ASA  (and currently is not on it)    Age-appropriate Screening Schedule:  Refer to the list below for future screening recommendations based on patient's age, sex and/or medical conditions. Orders for these recommended tests are listed in the plan section. The patient has been provided with a written plan.    Health Maintenance   Topic Date Due   • TDAP/TD VACCINES (1 - Tdap) Never done   • ZOSTER VACCINE (1 of 2) Never done   • INFLUENZA VACCINE  08/01/2021   • MAMMOGRAM  12/02/2021   • DXA SCAN  03/05/2022          The following portions of the patient's history were reviewed and updated as appropriate: allergies, current medications, past family history, past medical history, past social history, past surgical history and problem list.    Outpatient Medications Prior to Visit   Medication Sig Dispense Refill   • acetaminophen (TYLENOL) 500 MG tablet Take 1,000 mg by mouth Every 6 (Six) Hours As Needed for Mild Pain .     • calcium carbonate (OS-PAUL) 600 MG tablet Take 600 mg by mouth Daily.     • cholecalciferol (VITAMIN D3) 25 MCG (1000 UT) tablet Take 1,000 Units by mouth Daily.     • meclizine (ANTIVERT) 12.5 MG tablet Take 12.5 mg by mouth As Needed for Dizziness.     • amLODIPine (NORVASC) 2.5 MG tablet Take 1 tablet by mouth Daily. 30 tablet 5   • levothyroxine (SYNTHROID, LEVOTHROID) 75 MCG tablet Take 1 tablet by mouth Daily. 90 tablet 3     No facility-administered medications prior to visit.       Patient Active Problem List   Diagnosis   • Malignant neoplasm of breast in female, estrogen receptor positive (CMS/HCC)   • Vertigo   • History of skin cancer   • History of melanoma   • Osteoporosis   • Post-menopausal   • Dizziness   • Essential hypertension   • PAD (peripheral artery disease) (CMS/HCC)   • Claudication of both lower extremities (CMS/HCC)   • Asymptomatic bilateral carotid artery stenosis   • Osteopenia of hip   • Other fatigue   • Medicare annual wellness visit, subsequent   • Acquired  "hypothyroidism   • Left-sided chest pain       Advanced Care Planning:  ACP discussion was held with the patient during this visit. Patient has an advance directive in EMR which is still valid.     Review of Systems   Constitutional: Negative for activity change, appetite change and fatigue.   HENT: Negative for hearing loss and postnasal drip.    Eyes: Negative for discharge and itching.   Respiratory: Negative for cough and shortness of breath.    Cardiovascular: Negative for chest pain and leg swelling.   Gastrointestinal: Negative for abdominal distention and abdominal pain.   Endocrine: Negative for cold intolerance and heat intolerance.   Genitourinary: Negative for difficulty urinating and flank pain.   Musculoskeletal: Negative for arthralgias and myalgias.   Skin: Negative for color change.   Neurological: Negative for dizziness and facial asymmetry.   Hematological: Negative for adenopathy.   Psychiatric/Behavioral: Negative for agitation and confusion.       Compared to one year ago, the patient feels her physical health is the same.  Compared to one year ago, the patient feels her mental health is the same.    Reviewed chart for potential of high risk medication in the elderly: yes  Reviewed chart for potential of harmful drug interactions in the elderly:yes    Objective         Vitals:    04/08/21 1443   BP: 156/78   BP Location: Right arm   Patient Position: Sitting   Pulse: 81   Temp: 97.3 °F (36.3 °C)   TempSrc: Temporal   SpO2: 96%   Weight: 63 kg (139 lb)   Height: 165.1 cm (65\")       Body mass index is 23.13 kg/m².  Discussed the patient's BMI with her. The BMI is in the acceptable range.    Physical Exam  Vitals reviewed.   Constitutional:       Appearance: She is well-developed. She is not diaphoretic.   HENT:      Head: Normocephalic and atraumatic.   Eyes:      General: No scleral icterus.     Pupils: Pupils are equal, round, and reactive to light.   Neck:      Thyroid: No thyromegaly. "   Cardiovascular:      Rate and Rhythm: Normal rate and regular rhythm.      Heart sounds: No murmur heard.   No friction rub. No gallop.    Pulmonary:      Effort: Pulmonary effort is normal. No respiratory distress.      Breath sounds: No wheezing or rales.   Chest:      Chest wall: No tenderness.   Abdominal:      General: Bowel sounds are normal. There is no distension.      Palpations: Abdomen is soft.      Tenderness: There is no abdominal tenderness.   Musculoskeletal:         General: No deformity. Normal range of motion.   Lymphadenopathy:      Cervical: No cervical adenopathy.   Skin:     General: Skin is warm and dry.      Findings: No rash.   Neurological:      Cranial Nerves: No cranial nerve deficit.      Motor: No abnormal muscle tone.       CXR.  Indicated for L chest wall pain.   Findings.  NAD.          Assessment/Plan   Medicare Risks and Personalized Health Plan  CMS Preventative Services Quick Reference  Inactivity/Sedentary    The above risks/problems have been discussed with the patient.  Pertinent information has been shared with the patient in the After Visit Summary.  Follow up plans and orders are seen below in the Assessment/Plan Section.    Diagnoses and all orders for this visit:    1. Medicare annual wellness visit, subsequent (Primary)    2. Essential hypertension  -     amLODIPine (NORVASC) 5 MG tablet; 1/2 in am and 1/2 in pm.  Dispense: 90 tablet; Refill: 3    3. Acquired hypothyroidism  -     levothyroxine (SYNTHROID, LEVOTHROID) 75 MCG tablet; One a day except none on Sundays.  Dispense: 78 tablet; Refill: 3    4. Left-sided chest pain  -     XR Chest 2 View    Other orders  -     Pneumococcal Polysaccharide Vaccine 23-Valent Greater Than or Equal To 3yo Subcutaneous / IM      Follow Up:  No follow-ups on file.     An After Visit Summary and PPPS were given to the patient.       Preventive Counseling:  Counseled on staying active.  Covid received.  Pneumovax received today.   Mammo utd.

## 2021-05-18 ENCOUNTER — OFFICE VISIT (OUTPATIENT)
Dept: ONCOLOGY | Facility: CLINIC | Age: 86
End: 2021-05-18

## 2021-05-18 ENCOUNTER — LAB (OUTPATIENT)
Dept: LAB | Facility: HOSPITAL | Age: 86
End: 2021-05-18

## 2021-05-18 VITALS
RESPIRATION RATE: 16 BRPM | WEIGHT: 139.8 LBS | OXYGEN SATURATION: 96 % | HEART RATE: 77 BPM | HEIGHT: 65 IN | DIASTOLIC BLOOD PRESSURE: 75 MMHG | TEMPERATURE: 97.1 F | SYSTOLIC BLOOD PRESSURE: 154 MMHG | BODY MASS INDEX: 23.29 KG/M2

## 2021-05-18 DIAGNOSIS — Z17.0 MALIGNANT NEOPLASM OF BREAST IN FEMALE, ESTROGEN RECEPTOR POSITIVE, UNSPECIFIED LATERALITY, UNSPECIFIED SITE OF BREAST (HCC): Primary | ICD-10-CM

## 2021-05-18 DIAGNOSIS — Z17.0 MALIGNANT NEOPLASM OF BREAST IN FEMALE, ESTROGEN RECEPTOR POSITIVE, UNSPECIFIED LATERALITY, UNSPECIFIED SITE OF BREAST (HCC): ICD-10-CM

## 2021-05-18 DIAGNOSIS — C50.919 MALIGNANT NEOPLASM OF BREAST IN FEMALE, ESTROGEN RECEPTOR POSITIVE, UNSPECIFIED LATERALITY, UNSPECIFIED SITE OF BREAST (HCC): Primary | ICD-10-CM

## 2021-05-18 DIAGNOSIS — C50.919 MALIGNANT NEOPLASM OF BREAST IN FEMALE, ESTROGEN RECEPTOR POSITIVE, UNSPECIFIED LATERALITY, UNSPECIFIED SITE OF BREAST (HCC): ICD-10-CM

## 2021-05-18 DIAGNOSIS — Z12.31 SCREENING MAMMOGRAM, ENCOUNTER FOR: ICD-10-CM

## 2021-05-18 DIAGNOSIS — Z12.39 ENCOUNTER FOR SCREENING BREAST EXAMINATION: ICD-10-CM

## 2021-05-18 LAB
ALBUMIN SERPL-MCNC: 4.3 G/DL (ref 3.5–5.2)
ALBUMIN/GLOB SERPL: 1.5 G/DL (ref 1.1–2.4)
ALP SERPL-CCNC: 61 U/L (ref 38–116)
ALT SERPL W P-5'-P-CCNC: 8 U/L (ref 0–33)
ANION GAP SERPL CALCULATED.3IONS-SCNC: 10.5 MMOL/L (ref 5–15)
AST SERPL-CCNC: 15 U/L (ref 0–32)
BASOPHILS # BLD AUTO: 0.08 10*3/MM3 (ref 0–0.2)
BASOPHILS NFR BLD AUTO: 1.2 % (ref 0–1.5)
BILIRUB SERPL-MCNC: 0.5 MG/DL (ref 0.2–1.2)
BUN SERPL-MCNC: 24 MG/DL (ref 6–20)
BUN/CREAT SERPL: 23.1 (ref 7.3–30)
CALCIUM SPEC-SCNC: 9.8 MG/DL (ref 8.5–10.2)
CHLORIDE SERPL-SCNC: 104 MMOL/L (ref 98–107)
CO2 SERPL-SCNC: 27.5 MMOL/L (ref 22–29)
CREAT SERPL-MCNC: 1.04 MG/DL (ref 0.6–1.1)
DEPRECATED RDW RBC AUTO: 45.1 FL (ref 37–54)
EOSINOPHIL # BLD AUTO: 0.16 10*3/MM3 (ref 0–0.4)
EOSINOPHIL NFR BLD AUTO: 2.5 % (ref 0.3–6.2)
ERYTHROCYTE [DISTWIDTH] IN BLOOD BY AUTOMATED COUNT: 13.2 % (ref 12.3–15.4)
GFR SERPL CREATININE-BSD FRML MDRD: 50 ML/MIN/1.73
GLOBULIN UR ELPH-MCNC: 2.9 GM/DL (ref 1.8–3.5)
GLUCOSE SERPL-MCNC: 122 MG/DL (ref 74–124)
HCT VFR BLD AUTO: 41.8 % (ref 34–46.6)
HGB BLD-MCNC: 13.5 G/DL (ref 12–15.9)
IMM GRANULOCYTES # BLD AUTO: 0.01 10*3/MM3 (ref 0–0.05)
IMM GRANULOCYTES NFR BLD AUTO: 0.2 % (ref 0–0.5)
LYMPHOCYTES # BLD AUTO: 1.78 10*3/MM3 (ref 0.7–3.1)
LYMPHOCYTES NFR BLD AUTO: 27.7 % (ref 19.6–45.3)
MCH RBC QN AUTO: 30.3 PG (ref 26.6–33)
MCHC RBC AUTO-ENTMCNC: 32.3 G/DL (ref 31.5–35.7)
MCV RBC AUTO: 93.9 FL (ref 79–97)
MONOCYTES # BLD AUTO: 0.46 10*3/MM3 (ref 0.1–0.9)
MONOCYTES NFR BLD AUTO: 7.2 % (ref 5–12)
NEUTROPHILS NFR BLD AUTO: 3.94 10*3/MM3 (ref 1.7–7)
NEUTROPHILS NFR BLD AUTO: 61.2 % (ref 42.7–76)
NRBC BLD AUTO-RTO: 0 /100 WBC (ref 0–0.2)
PLATELET # BLD AUTO: 286 10*3/MM3 (ref 140–450)
PMV BLD AUTO: 9.4 FL (ref 6–12)
POTASSIUM SERPL-SCNC: 4.3 MMOL/L (ref 3.5–4.7)
PROT SERPL-MCNC: 7.2 G/DL (ref 6.3–8)
RBC # BLD AUTO: 4.45 10*6/MM3 (ref 3.77–5.28)
SODIUM SERPL-SCNC: 142 MMOL/L (ref 134–145)
WBC # BLD AUTO: 6.43 10*3/MM3 (ref 3.4–10.8)

## 2021-05-18 PROCEDURE — 80053 COMPREHEN METABOLIC PANEL: CPT

## 2021-05-18 PROCEDURE — 36415 COLL VENOUS BLD VENIPUNCTURE: CPT

## 2021-05-18 PROCEDURE — 85025 COMPLETE CBC W/AUTO DIFF WBC: CPT

## 2021-05-18 PROCEDURE — 99213 OFFICE O/P EST LOW 20 MIN: CPT | Performed by: INTERNAL MEDICINE

## 2021-05-18 NOTE — PROGRESS NOTES
Subjective       REASON FOR FOLLOW UP:    1. pT1c pNX Invasive Lobular Carcinoma of the Left Breast measuring 20 mm, grade II.  Her tumor was ER+ 96%, NJ+ 96%, and HER2/clayton Negative (0) with Ki67 of 3.34%.      2.  Patient was to start Arimidex February 2020 but has not started.  On further discussion she wants to avoid any medications including tamoxifen.    3.  Currently receiving radiation     4.  Osteopenia, had a bone density which I have reviewed with the patient.                               HISTORY OF PRESENT ILLNESS:      Patient is a 87-year-old female with history of T1c NX invasive lobular carcinoma of the left breast, 20 mm, ER NJ positive HER-2/clayton negative who was to start Arimidex when I saw her last appointment but patient now refuses to take any medications..  Patient had a bone density as of March 5, 2020 and it is consistent with osteopenia.  She is taking calcium and vitamin D supplements.      She had appointment with Dr. Plasencia today.  She underwent a breast exam in his office and it was negative.  She is currently receiving radiation through Dr. Joiner.    We discussed about aromatase inhibitor and tamoxifen and she refuses any treatments for her breast cancer.    Interval history:    Patient denies any complaints.. Patient states she has a cold feeling once a day and she does not know why.  She does not have any signs of fever or chills.  She does not have dysuria or frequency of urination.  Patient has not lost weight she has got a good appetite.  Her last screening mammogram was December 2020 which was negative.  Patient has refused endocrine therapy in the past    Oncologic history:  The patient is a 88 y.o. year old female who is here for an opinion about the above issue.    She is currently followed by PCP Dr. Yuniel Bonilla.  Patient had never had mammograms since 2013.  In October 2019 she noted indentation of the skin in the breast.  She did not have pain but she did feel a  marble size lump at the time.  She had a screening mammogram in November 2019 which had revealed a 12 mm mass in the middle one-third region of the left breast at 1:00 position 4 cmfn.  She, then, went for a diagnostic mammogram and ultrasound in December.  The mammogram revealed an irregular 15 mm mass in the left breast at the 12:00 position and the ultrasound revealed an irregular [id number] mm mass in the left breast at the 12:00 position 4 cmfn and an oval elongated mass measuring 7x3x5 mm in the left breast at the 1:00 position.  She was referred to Dr. Plasencia who performed an US-guided biopsy which showed Invasive Carcinoma with Ductal and Lobular features, low grade, measuring 15 mm, ER+ 96%, NY+ 96%, HER2/clayton negative (0), Ki67 3%.  The mass at the 1:00 position was a fibroadenoma.  She underwent a left breast lumpectomy on February 5, 2020 which confirmed Invasive Lobular Carcinoma of the Left Breast measuring 20 mm, grade II.  Her tumor was ER+ 96%, NY+ 96%, and HER2/clayton Negative (0).    Her personal history consists of vertigo, skin cancer of right leg in 2008 and of left nose in 2015.  She had a melanoma in 2017.    Her family history consists of her sister who had breast cancer at age 46.  Her maternal grandmother had breast cancer in her 70s.  Her daughter has chronic lymphocytic leukemia.        Details of the diagnostics scans and procedures are as follows:    11/27/19 - Screening Mammogram  Finding 1: There is a new irregular mass measuring 12 mm with spiculated margins seen inthe middle one-third region of the left beast at the 1:00 position located 4 cmfn.    Finding 2: There are vascular calcifications seen in both breasts.  No suspicious masses in the right breast.    12/26/19 - Diagnostic Mammogram  Finding 1: There is an irregular mass measuring 15 mm with spiculated margins in the upper outer region of the left breast 4 cmfn.      Ultrasound:  Finding 1: Irregular hypoechoic mass measuring  [id number] mm at 12:00 4cmfn.    Finding 2: There is an oval elongated mass with indistinct margins measuring 7x3x5 mm in the left breast at 1:00, 1.7 cm from the mass at 12:00.    01/09/20 - US-Guided Biopsy  Left Breast 12:00 Position   Invasive Carcinoma with Ductal and Lobular features, low grade, measuring 15 mm, ER+ 96%, NY+ 96%, HER2/clayton negative (0), Ki67 3%    Left Breast 1:00 Position  Fibroadenoma    02/05/20 - Left Breast Lumpectomy   A. Invasive lobular carcinoma:                            1. Invasive carcinoma measures 20 mm x  14 mm x 10 mm.                            2. Overall Shelley grade II (tubular score = 3, nuclear score = 2,                                mitotic score = 1).                            3. No lymphovascular invasion identified.                            4. Microcalcifications are present in the invasive component.               B. No carcinoma in situ component identified.               C. All margins are negative for invasive carcinoma.                    Carcinoma measures 2.0 mm from the closest (Medial) margin of excision.                     All other margins measure at least 4.0 mm from invasive carcinoma including:                            Anterior margin = 5 mm                            Posterior margin = 10 mm                            Superior margin = 20 mm                            Inferior margin =15mm                             Lateral margin = 4 mm                            Medial margin = 2 mm                  D. Rare focus of atypical lobular hyperplasia (ALH) noted, Not present at the margin. Focal fibroadenoma                    involved by invasive lobular carcinoma noted, Not present at the margin.               E. Focal biopsy site changes are identified.               F. Focal atypical ductal hyperplasia noted, not present at the margin.               G. Non-neoplastic breast tissue with intraductal papilloma, adenosis and fibrocystic change  noted.                    Microcalcification noted in benign breast tissue.               H. Previous Biomarkers: Estrogen receptors: positive (95.53%), Progesterone receptors: positive (96.15%),                    HER/2-clayton: negative (0), Ki-67 = 3.34%    TUMOR   Tumor Site  Upper outer quadrant    Histologic Type  Invasive lobular carcinoma    Glandular (Acinar) / Tubular Differentiation  Score 3    Nuclear Pleomorphism  Score 2    Mitotic Rate  Score 1    Overall Grade  Grade 2 (scores of 6 or 7)    Tumor Size  Greatest dimension of largest invasive focus (Millimeters): 20 mm   Additional Dimension (Millimeters)  14 mm     10 mm   Tumor Focality  Single focus of invasive carcinoma    Ductal Carcinoma In Situ (DCIS)  Not identified    Lobular Carcinoma In Situ (LCIS)  No LCIS in specimen    Tumor Extent     Lymphovascular Invasion  Not identified    Dermal Lymphovascular Invasion  No skin present    Microcalcifications  Present in invasive carcinoma      Present in non-neoplastic tissue    Treatment Effect  No known presurgical therapy    MARGINS   Invasive Carcinoma Margins  Uninvolved by invasive carcinoma    Distance from Closest Margin (Millimeters)  5 mm   Closest Margin  Anterior    Distance from Other Margins     Anterior Margin (Millimeters)  5 mm   Posterior Margin (Millimeters)  10 mm   Superior Margin (Millimeters)  20 mm   Inferior Margin (Millimeters)  15 mm   Medial Margin (Millimeters)  8 mm   Lateral Margin (Millimeters)  10 mm   LYMPH NODES   Regional Lymph Nodes  No lymph nodes submitted or found    PATHOLOGIC STAGE CLASSIFICATION (pTNM, AJCC 8th Edition)   Primary Tumor (pT)  pT1c    Regional Lymph Nodes (pN)     Category (pN)  pNX    SPECIAL STUDIES   Breast Biomarker Testing Performed on Previous Biopsy     Estrogen Receptor (ER)  Positive (percentage): 95.5 %   Breast Biomarker Testing Performed on Previous Biopsy     Progesterone Receptor (PgR)  Positive (percentage): 96.2 %   Breast  "Biomarker Testing Performed on Previous Biopsy     HER2 (by immunohistochemistry)  Negative (Score 0)    Testing Performed on Case Number  [id number] (Not reviewed)    Comment(s)   Comment(s)  Ki67= 3.34%      Patient refused endocrine therapy    Currently on radiation      OB-GYN:  Menarche- 13  Menopause- 50  Pregnancies-  A0  Post menopausal HRT- Premarin for two years.  Hx of birth control pills- None    SOCIAL HISTORY:  Patient is retired and is .  She is a former smoker and she quit in 1970.  She doesn't drink alcohol.    Past Medical History:   Diagnosis Date   • Anxiety    • Arthritis    • Cancer (CMS/HCC) 2020    left breast   • Coronary artery disease     \"BLOCKAGE IN MY LEGS\"   • Hemorrhoids    • History of basal cell cancer    • History of headaches    • Hypertension    • Hypothyroidism    • Melanoma (CMS/HCC)     LEFT LEG   • PAD (peripheral artery disease) (CMS/HCC)    • Skin cancer     left nose   • Skin cancer     right leg   • Vertigo         Past Surgical History:   Procedure Laterality Date   • BREAST LUMPECTOMY WITH SENTINEL NODE BIOPSY Left 2020    Procedure: BREAST LUMPECTOMY WITH NEEDLE LOCALIZATION;  Surgeon: Kush Plasencia MD;  Location: [facility];  Service: General;  Laterality: Left;   • BREAST SURGERY Left     BIOPSY   • COLONOSCOPY      ALL OK   • COSMETIC SURGERY      AFTER SKIN EXCISION FROM NOSE   • ENDOSCOPY     • EYE SURGERY Bilateral     CATARACTS   • LEG SURGERY Left     MELANOMA EXCISION-WITH SKIN GRAFT   • SKIN BIOPSY     • SKIN CANCER EXCISION Bilateral     NOSE, UPPER LIP   • SKIN GRAFT Bilateral    • VASCULAR SURGERY Bilateral     STENTS IN BOTH UPPER LEGS        Current Outpatient Medications on File Prior to Visit   Medication Sig Dispense Refill   • acetaminophen (TYLENOL) 500 MG tablet Take 1,000 mg by mouth Every 6 (Six) Hours As Needed for Mild Pain .     • amLODIPine (NORVASC) 5 MG tablet 1/2 in am and 1/2 in " pm. 90 tablet 3   • calcium carbonate (OS-PAUL) 600 MG tablet Take 600 mg by mouth Daily.     • cholecalciferol (VITAMIN D3) 25 MCG (1000 UT) tablet Take 1,000 Units by mouth Daily.     • levothyroxine (SYNTHROID, LEVOTHROID) 75 MCG tablet One a day except none on Sundays. 78 tablet 3   • meclizine (ANTIVERT) 12.5 MG tablet Take 12.5 mg by mouth As Needed for Dizziness.       No current facility-administered medications on file prior to visit.        ALLERGIES:    Allergies   Allergen Reactions   • Codeine Mental Status Change        Social History     Socioeconomic History   • Marital status:      Spouse name: Vladimir   • Number of children: 4   • Years of education: Not on file   • Highest education level: Not on file   Tobacco Use   • Smoking status: Former Smoker     Quit date:      Years since quittin.4   • Smokeless tobacco: Never Used   Substance and Sexual Activity   • Alcohol use: Never   • Drug use: Never   • Sexual activity: Defer        Family History   Problem Relation Age of Onset   • Heart failure Father    • Skin cancer Father    • Heart disease Father    • Breast cancer Sister 46   • Heart disease Sister    • Esophageal cancer Brother    • Heart disease Brother    • Leukemia Daughter    • Breast cancer Maternal Grandmother         70s   • Colon cancer Maternal Grandmother         Review of Systems   Constitutional: Negative.  Negative for appetite change, chills, diaphoresis, fatigue, fever and unexpected weight change.   HENT: Negative for hearing loss, sore throat and trouble swallowing.    Eyes: Negative.    Respiratory: Negative.  Negative for cough, chest tightness, shortness of breath and wheezing.    Cardiovascular: Negative.  Negative for chest pain, palpitations and leg swelling.   Gastrointestinal: Negative.  Negative for abdominal distention, abdominal pain, constipation, diarrhea, nausea and vomiting.   Endocrine: Negative.    Genitourinary: Negative.  Negative for dysuria,  "frequency, hematuria and urgency.   Musculoskeletal: Negative.  Negative for joint swelling.        No muscle weakness.   Skin: Negative.  Negative for rash and wound.   Allergic/Immunologic: Negative.    Neurological: Negative for seizures, syncope, speech difficulty, weakness and numbness. Dizziness: 11/24/20-Unchanged. Headaches: 11/24/20-Unchanged.   Hematological: Negative.  Negative for adenopathy. Does not bruise/bleed easily.   Psychiatric/Behavioral: Negative.  Negative for behavioral problems, confusion and suicidal ideas.   All other systems reviewed and are negative.  Patient has vertigo stable  I have reviewed and confirmed the accuracy of the ROS as documented by the MA/PHANI/MARGOTH Faulkner MD    I have reviewed and confirmed the accuracy of the ROS as documented by the MA/PHANI/MARGOTH Faulkner MD        Objective     Vitals:    05/18/21 1410   BP: 154/75  Comment: w/B/P Meds taken at 7:30AM   Pulse: 77   Resp: 16   Temp: 97.1 °F (36.2 °C)   TempSrc: Skin   SpO2: 96%   Weight: 63.4 kg (139 lb 12.8 oz)   Height: 165.1 cm (65\")   PainSc: 0-No pain     Current Status 5/18/2021   ECOG score 0     Physical examination        This patient's ACP documentation is up to date, and there's nothing further left to document.    CONSTITUTIONAL:  Vital signs reviewed.  Alert and oriented x3  No distress, looks comfortable.  EYES:  Conjunctiva and lids unremarkable.  Extraocular eye movements intact.  HEENT: No evidence of lymphadenopathy, no thyromegaly  RESPIRATORY:  Normal respiratory effort.  , no rales  or wheezing, clear   BREAST: Right breast: No skin changes, no evidence of breast mass, no nipple discharge, no evidence of any right axillary adenopathy or right supraclavicular adenopathy  Left breast: No evidence of any skin changes, no evidence of any left breast mass and no evidence of left nipple discharge as well as no left axillary adenopathy or left supraclavicular adenopathy.  CARDIOVASCULAR:  Regular " rate and rhythm, no murmur  No significant lower extremity edema.  Abdomen: Soft nontender positive bowel sounds no hepatosplenomegaly  SKIN: No wounds.  No rashes.  MUSCULOSKELETAL/EXTREMITIES: No clubbing or cyanosis.  No apparent unilateral weakness.  NEURO: CN 2-12 appear intact. No focal neurological deficits noted.  PSYCHIATRIC:  Normal judgment and insight.  Normal mood and affect.       RECENT LABS:  Hematology WBC   Date Value Ref Range Status   05/18/2021 6.43 3.40 - 10.80 10*3/mm3 Final   09/17/2018 6.22 4.5 - 11.0 10*3/uL Final     RBC   Date Value Ref Range Status   05/18/2021 4.45 3.77 - 5.28 10*6/mm3 Final   09/17/2018 4.34 4.0 - 5.2 10*6/uL Final     Hemoglobin   Date Value Ref Range Status   05/18/2021 13.5 12.0 - 15.9 g/dL Final   09/17/2018 13.2 12.0 - 16.0 g/dL Final     Hematocrit   Date Value Ref Range Status   05/18/2021 41.8 34.0 - 46.6 % Final   09/17/2018 41.3 36.0 - 46.0 % Final     Platelets   Date Value Ref Range Status   05/18/2021 286 140 - 450 10*3/mm3 Final   09/17/2018 457 (H) 140 - 440 10*3/uL Final          Assessment/Plan     1. pT1c pNX Invasive Lobular Carcinoma of the Left Breast measuring 20 mm, grade II.  Her tumor was ER+ 96%, VT+ 96%, and HER2/clayton Negative (0) with Ki67 of 3.34%.    -Screening mammogram, 12 mm mass, spiculated margins left breast, 1 o'clock position, 4 cm from the nipple  -Diagnostic mammogram, irregular 15 mm mass left breast , 4 cm from the nipple  -Ultrasound showed irregular mass 15 x 13 x 13 at 12:00, oval mass with indistinct margin 7 x 3 x 5 mm left breast at 1:00  -Left breast ultrasound-guided biopsy, 12 o'clock position, invasive carcinoma with ductal and lobular features  -left breast C, 1 o'clock position, fibroadenoma  -February 5, 2020, left breast lumpectomy, invasive lobular carcinoma, 20 x 14 x 10 mm, grade 2, [place] score of 5 no lymphovascular space invasion, no evidence of DCIS, margins are all negative, rare focus of atypical  lobular hyperplasia, not present at margin,    · I had a lengthy discussion with the patient and family that at the age of 86 years given that this is highly ER MN positive tumor, HER-2 negative, she is not a candidate for any chemotherapy given her age.  I do not see any need to obtain an Oncotype DX in the setting.  She is certainly a candidate for endocrine therapy.  My question would be if sentinel lymph node has to be considered given that she had a 20 mm invasive lobular carcinoma.  However patient is being seen by radiation oncology and will get input from them as well to see if axilla would be included.  Will discuss with Dr. Plasencia.  · Patient to consult radiation oncology for opinion on 03/03/20.  Given patient's cancer was ER/MN+, she will certainly benefit from endocrine therapy.  Given her past medical history and current health, I think Arimidex would be the best choice of treatment.  Patient has refused to undergo any endocrine therapy after explaining the details of the side effects.  She is to follow-up with radiation oncology and so far 17 out of 21 treatments given  Patient has refused endocrine therapy even after discussing in length.  Patient is doing well without any complaints and reviewed the mammogram from December 2, 2020 and its negative    2. Family history of malignancy.  Her family history consists of her sister who had breast cancer at age 46.  Her maternal grandmother had breast cancer in her 70s.  Her daughter has chronic lymphocytic leukemia.  · Genetic testing could be considered to see if that information benefits the rest of the family.        3. History of skin cancer in the right leg (2008) and left nose (2015).    4. History of Melanoma of the left leg (2017).  Patient to follow with dermatology    5. Vertigo.  Patient reports feeling dizzy constantly.  Stable  6. Osteopenia: Patient to take calcium and vitamin D supplements.  Discussed either oral bisphosphonates like  Actonel or Fosamax or Prolia injectable but patient refuses  7. Fatigue since radiation likely related to radiation        PLAN:  1. Patient refused endocrine therapy  2. She is taking calcium and vitamin D but refuses Prolia  3. Reviewed mammogram from December 2, 2020 and in my interpretation is negative  4. Follow-up with me in 6 months with labs currently no evidence of disease    Sowmya Faulkner MD       Cc:  MD Sima Costa MD Stephen Self, MD Stuart Spalding, MD

## 2021-09-02 ENCOUNTER — OFFICE VISIT (OUTPATIENT)
Dept: FAMILY MEDICINE CLINIC | Facility: CLINIC | Age: 86
End: 2021-09-02

## 2021-09-02 VITALS
HEIGHT: 65 IN | OXYGEN SATURATION: 99 % | WEIGHT: 136 LBS | DIASTOLIC BLOOD PRESSURE: 68 MMHG | TEMPERATURE: 97.8 F | HEART RATE: 62 BPM | BODY MASS INDEX: 22.66 KG/M2 | SYSTOLIC BLOOD PRESSURE: 140 MMHG

## 2021-09-02 DIAGNOSIS — E03.9 ACQUIRED HYPOTHYROIDISM: ICD-10-CM

## 2021-09-02 DIAGNOSIS — I10 ESSENTIAL HYPERTENSION: Primary | ICD-10-CM

## 2021-09-02 PROCEDURE — 99214 OFFICE O/P EST MOD 30 MIN: CPT | Performed by: FAMILY MEDICINE

## 2021-09-02 RX ORDER — LEVOTHYROXINE SODIUM 0.07 MG/1
TABLET ORAL
Qty: 78 TABLET | Refills: 3 | Status: SHIPPED | OUTPATIENT
Start: 2021-09-02 | End: 2021-10-23 | Stop reason: SDUPTHER

## 2021-09-02 NOTE — PROGRESS NOTES
Chief Complaint   Patient presents with   • Hypertension       Subjective   Mela Muse is a 88 y.o. female.     History of Present Illness   F/u HTN.  No orhtostasis.  Doing well with meds.    F/U hypothyroidism.    On levothyroxine daily except none on Sundays.     The following portions of the patient's history were reviewed and updated as appropriate: allergies, current medications, past family history, past medical history, past social history, past surgical history and problem list.    Review of Systems   Constitutional: Negative for appetite change and fatigue.   HENT: Negative for nosebleeds and sore throat.    Eyes: Negative for blurred vision and visual disturbance.   Respiratory: Negative for shortness of breath and wheezing.    Cardiovascular: Negative for chest pain and leg swelling.   Gastrointestinal: Negative for abdominal distention and abdominal pain.   Endocrine: Negative for cold intolerance and polyuria.   Genitourinary: Negative for dysuria and hematuria.   Musculoskeletal: Negative for arthralgias and myalgias.   Skin: Negative for color change and rash.   Neurological: Negative for weakness and confusion.   Psychiatric/Behavioral: Negative for agitation and depressed mood.       Patient Active Problem List   Diagnosis   • Malignant neoplasm of breast in female, estrogen receptor positive (CMS/HCC)   • Vertigo   • History of skin cancer   • History of melanoma   • Osteoporosis   • Post-menopausal   • Dizziness   • Essential hypertension   • PAD (peripheral artery disease) (CMS/HCC)   • Claudication of both lower extremities (CMS/HCC)   • Asymptomatic bilateral carotid artery stenosis   • Osteopenia of hip   • Other fatigue   • Medicare annual wellness visit, subsequent   • Acquired hypothyroidism   • Left-sided chest pain       Allergies   Allergen Reactions   • Codeine Mental Status Change         Current Outpatient Medications:   •  acetaminophen (TYLENOL) 500 MG tablet, Take 1,000 mg  "by mouth Every 6 (Six) Hours As Needed for Mild Pain ., Disp: , Rfl:   •  amLODIPine (NORVASC) 5 MG tablet, 1/2 in am and 1/2 in pm., Disp: 90 tablet, Rfl: 3  •  calcium carbonate (OS-PAUL) 600 MG tablet, Take 600 mg by mouth Daily., Disp: , Rfl:   •  cholecalciferol (VITAMIN D3) 25 MCG (1000 UT) tablet, Take 1,000 Units by mouth Daily., Disp: , Rfl:   •  levothyroxine (SYNTHROID, LEVOTHROID) 75 MCG tablet, One a day except none on Sundays., Disp: 78 tablet, Rfl: 3  •  magnesium oxide (MAGOX) 400 (241.3 Mg) MG tablet tablet, Take 400 mg by mouth Daily., Disp: , Rfl:   •  meclizine (ANTIVERT) 12.5 MG tablet, Take 12.5 mg by mouth As Needed for Dizziness., Disp: , Rfl:   •  Riboflavin (VITAMIN B2 PO), Take  by mouth Daily., Disp: , Rfl:     Past Medical History:   Diagnosis Date   • Anxiety    • Arthritis    • Cancer (CMS/HCC) 01/2020    left breast   • Cancer of skin of leg, right 2021   • Coronary artery disease     \"BLOCKAGE IN MY LEGS\"   • Hemorrhoids    • History of basal cell cancer    • History of headaches    • Hypertension    • Hypothyroidism    • Melanoma (CMS/HCC) 2017    LEFT LEG   • PAD (peripheral artery disease) (CMS/HCC)    • Skin cancer 2015    left nose   • Skin cancer 2008    right leg   • Vertigo    • Vestibular migraine 2021       Past Surgical History:   Procedure Laterality Date   • BREAST LUMPECTOMY WITH SENTINEL NODE BIOPSY Left 2/5/2020    Procedure: BREAST LUMPECTOMY WITH NEEDLE LOCALIZATION;  Surgeon: Kush Plasencia MD;  Location: [facility];  Service: General;  Laterality: Left;   • BREAST SURGERY Left 2020    BIOPSY   • COLONOSCOPY      ALL OK   • COSMETIC SURGERY      AFTER SKIN EXCISION FROM NOSE   • ENDOSCOPY     • EYE SURGERY Bilateral     CATARACTS   • LEG SURGERY Left 2017    MELANOMA EXCISION-WITH SKIN GRAFT   • SKIN BIOPSY  2008   • SKIN CANCER EXCISION Bilateral 2015    NOSE, UPPER LIP   • SKIN GRAFT Bilateral    • VASCULAR SURGERY Bilateral 2018    STENTS IN BOTH UPPER " LEGS       Family History   Problem Relation Age of Onset   • Heart failure Father    • Skin cancer Father    • Heart disease Father    • Breast cancer Sister 46   • Heart disease Sister    • Esophageal cancer Brother    • Heart disease Brother    • Leukemia Daughter    • Breast cancer Maternal Grandmother         70s   • Colon cancer Maternal Grandmother        Social History     Tobacco Use   • Smoking status: Former Smoker     Quit date: 1970     Years since quittin.7   • Smokeless tobacco: Never Used   Substance Use Topics   • Alcohol use: Never            Objective     Vitals:    21 0927   BP: 140/68   Pulse: 62   Temp: 97.8 °F (36.6 °C)   SpO2: 99%     Body mass index is 22.63 kg/m².    Physical Exam  Vitals reviewed.   Constitutional:       Appearance: She is well-developed. She is not diaphoretic.   HENT:      Head: Normocephalic and atraumatic.   Eyes:      General: No scleral icterus.     Pupils: Pupils are equal, round, and reactive to light.   Neck:      Thyroid: No thyromegaly.   Cardiovascular:      Rate and Rhythm: Normal rate and regular rhythm.      Heart sounds: No murmur heard.   No friction rub. No gallop.    Pulmonary:      Effort: Pulmonary effort is normal. No respiratory distress.      Breath sounds: No wheezing or rales.   Chest:      Chest wall: No tenderness.   Abdominal:      General: Bowel sounds are normal. There is no distension.      Palpations: Abdomen is soft.      Tenderness: There is no abdominal tenderness.   Musculoskeletal:         General: No deformity. Normal range of motion.   Lymphadenopathy:      Cervical: No cervical adenopathy.   Skin:     General: Skin is warm and dry.      Findings: No rash.   Neurological:      Cranial Nerves: No cranial nerve deficit.      Motor: No abnormal muscle tone.         Lab Results   Component Value Date    GLUCOSE 122 2021    BUN 24 (H) 2021    CREATININE 1.04 2021    EGFRIFNONA 50 (L) 2021    BCR 23.1  05/18/2021    K 4.3 05/18/2021    CO2 27.5 05/18/2021    CALCIUM 9.8 05/18/2021    ALBUMIN 4.30 05/18/2021    AST 15 05/18/2021    ALT 8 05/18/2021       WBC   Date Value Ref Range Status   05/18/2021 6.43 3.40 - 10.80 10*3/mm3 Final   09/17/2018 6.22 4.5 - 11.0 10*3/uL Final     RBC   Date Value Ref Range Status   05/18/2021 4.45 3.77 - 5.28 10*6/mm3 Final   09/17/2018 4.34 4.0 - 5.2 10*6/uL Final     Hemoglobin   Date Value Ref Range Status   05/18/2021 13.5 12.0 - 15.9 g/dL Final   09/17/2018 13.2 12.0 - 16.0 g/dL Final     Hematocrit   Date Value Ref Range Status   05/18/2021 41.8 34.0 - 46.6 % Final   09/17/2018 41.3 36.0 - 46.0 % Final     MCV   Date Value Ref Range Status   05/18/2021 93.9 79.0 - 97.0 fL Final   09/17/2018 95.2 80.0 - 100.0 fL Final     MCH   Date Value Ref Range Status   05/18/2021 30.3 26.6 - 33.0 pg Final   09/17/2018 30.4 26.0 - 34.0 pg Final     MCHC   Date Value Ref Range Status   05/18/2021 32.3 31.5 - 35.7 g/dL Final   09/17/2018 32.0 31.0 - 37.0 g/dL Final     RDW   Date Value Ref Range Status   05/18/2021 13.2 12.3 - 15.4 % Final   09/17/2018 13.7 12.0 - 16.8 % Final     RDW-SD   Date Value Ref Range Status   05/18/2021 45.1 37.0 - 54.0 fl Final     MPV   Date Value Ref Range Status   05/18/2021 9.4 6.0 - 12.0 fL Final   09/17/2018 9.6 6.7 - 10.8 fL Final     Platelets   Date Value Ref Range Status   05/18/2021 286 140 - 450 10*3/mm3 Final   09/17/2018 457 (H) 140 - 440 10*3/uL Final     Neutrophil Rel %   Date Value Ref Range Status   09/17/2018 62.9 45 - 80 % Final     Neutrophil %   Date Value Ref Range Status   05/18/2021 61.2 42.7 - 76.0 % Final     Lymphocyte Rel %   Date Value Ref Range Status   09/17/2018 23.0 15 - 50 % Final     Lymphocyte %   Date Value Ref Range Status   05/18/2021 27.7 19.6 - 45.3 % Final     Monocyte Rel %   Date Value Ref Range Status   09/17/2018 8.7 0 - 15 % Final     Monocyte %   Date Value Ref Range Status   05/18/2021 7.2 5.0 - 12.0 % Final      Eosinophil %   Date Value Ref Range Status   05/18/2021 2.5 0.3 - 6.2 % Final   09/17/2018 3.4 0 - 7 % Final     Basophil Rel %   Date Value Ref Range Status   09/17/2018 1.8 0 - 2 % Final     Basophil %   Date Value Ref Range Status   05/18/2021 1.2 0.0 - 1.5 % Final     Immature Grans %   Date Value Ref Range Status   05/18/2021 0.2 0.0 - 0.5 % Final   09/17/2018 0.2 (H) 0 % Final     Neutrophils Absolute   Date Value Ref Range Status   09/17/2018 3.92 2.0 - 8.8 10*3/uL Final     Neutrophils, Absolute   Date Value Ref Range Status   05/18/2021 3.94 1.70 - 7.00 10*3/mm3 Final     Lymphocytes Absolute   Date Value Ref Range Status   09/17/2018 1.43 0.7 - 5.5 10*3/uL Final     Lymphocytes, Absolute   Date Value Ref Range Status   05/18/2021 1.78 0.70 - 3.10 10*3/mm3 Final     Monocytes Absolute   Date Value Ref Range Status   09/17/2018 0.54 0.0 - 1.7 10*3/uL Final     Monocytes, Absolute   Date Value Ref Range Status   05/18/2021 0.46 0.10 - 0.90 10*3/mm3 Final     Eosinophils Absolute   Date Value Ref Range Status   09/17/2018 0.21 0.0 - 0.8 10*3/uL Final     Eosinophils, Absolute   Date Value Ref Range Status   05/18/2021 0.16 0.00 - 0.40 10*3/mm3 Final     Basophils Absolute   Date Value Ref Range Status   09/17/2018 0.11 0.0 - 0.2 10*3/uL Final     Basophils, Absolute   Date Value Ref Range Status   05/18/2021 0.08 0.00 - 0.20 10*3/mm3 Final     Immature Grans, Absolute   Date Value Ref Range Status   05/18/2021 0.01 0.00 - 0.05 10*3/mm3 Final   09/17/2018 0.01 <1 10*3/uL Final     nRBC   Date Value Ref Range Status   05/18/2021 0.0 0.0 - 0.2 /100 WBC Final       No results found for: HGBA1C    Lab Results   Component Value Date    MQOELAYX62 448 02/27/2020       TSH   Date Value Ref Range Status   02/27/2020 2.120 0.270 - 4.200 uIU/mL Final       No results found for: CHOL  No results found for: TRIG  No results found for: HDL  No results found for: LDL  No results found for: VLDL  No results found for:  [facility]      Procedures    Assessment/Plan   Problems Addressed this Visit     Acquired hypothyroidism    Relevant Medications    levothyroxine (SYNTHROID, LEVOTHROID) 75 MCG tablet    Other Relevant Orders    TSH    Essential hypertension - Primary      Diagnoses       Codes Comments    Essential hypertension    -  Primary ICD-10-CM: I10  ICD-9-CM: 401.9     Acquired hypothyroidism     ICD-10-CM: E03.9  ICD-9-CM: 244.9       HTN.  Controlled.  Continue amlo.   Hypothyroidism.  Check TSH.    PVD with L leg claudication . Noted progression of in stent stenosis from 3/21 u/s.  To Dr Mathis.     Orders Placed This Encounter   Procedures   • TSH     Order Specific Question:   Release to patient     Answer:   Immediate       Current Outpatient Medications   Medication Sig Dispense Refill   • acetaminophen (TYLENOL) 500 MG tablet Take 1,000 mg by mouth Every 6 (Six) Hours As Needed for Mild Pain .     • amLODIPine (NORVASC) 5 MG tablet 1/2 in am and 1/2 in pm. 90 tablet 3   • calcium carbonate (OS-PAUL) 600 MG tablet Take 600 mg by mouth Daily.     • cholecalciferol (VITAMIN D3) 25 MCG (1000 UT) tablet Take 1,000 Units by mouth Daily.     • levothyroxine (SYNTHROID, LEVOTHROID) 75 MCG tablet One a day except none on Sundays. 78 tablet 3   • magnesium oxide (MAGOX) 400 (241.3 Mg) MG tablet tablet Take 400 mg by mouth Daily.     • meclizine (ANTIVERT) 12.5 MG tablet Take 12.5 mg by mouth As Needed for Dizziness.     • Riboflavin (VITAMIN B2 PO) Take  by mouth Daily.       No current facility-administered medications for this visit.       Mela Muse had no medications administered during this visit.    Return in about 6 months (around 3/2/2022).    There are no Patient Instructions on file for this visit.

## 2021-09-03 LAB — TSH SERPL DL<=0.005 MIU/L-ACNC: 1.9 UIU/ML (ref 0.27–4.2)

## 2021-09-13 ENCOUNTER — APPOINTMENT (OUTPATIENT)
Dept: GENERAL RADIOLOGY | Facility: HOSPITAL | Age: 86
End: 2021-09-13

## 2021-09-13 ENCOUNTER — TELEPHONE (OUTPATIENT)
Dept: ORTHOPEDIC SURGERY | Facility: CLINIC | Age: 86
End: 2021-09-13

## 2021-09-13 ENCOUNTER — TRANSCRIBE ORDERS (OUTPATIENT)
Dept: ADMINISTRATIVE | Facility: HOSPITAL | Age: 86
End: 2021-09-13

## 2021-09-13 ENCOUNTER — HOSPITAL ENCOUNTER (EMERGENCY)
Facility: HOSPITAL | Age: 86
Discharge: HOME | End: 2021-09-13
Attending: EMERGENCY MEDICINE | Admitting: EMERGENCY MEDICINE

## 2021-09-13 ENCOUNTER — TELEPHONE (OUTPATIENT)
Dept: FAMILY MEDICINE CLINIC | Facility: CLINIC | Age: 86
End: 2021-09-13

## 2021-09-13 VITALS
RESPIRATION RATE: 16 BRPM | SYSTOLIC BLOOD PRESSURE: 138 MMHG | DIASTOLIC BLOOD PRESSURE: 61 MMHG | HEIGHT: 65 IN | TEMPERATURE: 97.6 F | BODY MASS INDEX: 22.63 KG/M2 | HEART RATE: 61 BPM | OXYGEN SATURATION: 98 %

## 2021-09-13 DIAGNOSIS — S42.292A OTHER CLOSED DISPLACED FRACTURE OF PROXIMAL END OF LEFT HUMERUS, INITIAL ENCOUNTER: Primary | ICD-10-CM

## 2021-09-13 DIAGNOSIS — Z20.822 EXPOSURE TO COVID-19 VIRUS: ICD-10-CM

## 2021-09-13 DIAGNOSIS — U07.1 CLINICAL DIAGNOSIS OF SEVERE ACUTE RESPIRATORY SYNDROME CORONAVIRUS 2 (SARS-COV-2) DISEASE: Primary | ICD-10-CM

## 2021-09-13 LAB — SARS-COV-2 ORF1AB RESP QL NAA+PROBE: DETECTED

## 2021-09-13 PROCEDURE — 25010000002 HYDROMORPHONE PER 4 MG: Performed by: NURSE PRACTITIONER

## 2021-09-13 PROCEDURE — 96372 THER/PROPH/DIAG INJ SC/IM: CPT

## 2021-09-13 PROCEDURE — 63710000001 ONDANSETRON ODT 4 MG TABLET DISPERSIBLE: Performed by: NURSE PRACTITIONER

## 2021-09-13 PROCEDURE — U0004 COV-19 TEST NON-CDC HGH THRU: HCPCS | Performed by: NURSE PRACTITIONER

## 2021-09-13 PROCEDURE — 99284 EMERGENCY DEPT VISIT MOD MDM: CPT

## 2021-09-13 PROCEDURE — U0005 INFEC AGEN DETEC AMPLI PROBE: HCPCS | Performed by: NURSE PRACTITIONER

## 2021-09-13 PROCEDURE — C9803 HOPD COVID-19 SPEC COLLECT: HCPCS | Performed by: NURSE PRACTITIONER

## 2021-09-13 PROCEDURE — 73060 X-RAY EXAM OF HUMERUS: CPT

## 2021-09-13 RX ORDER — HYDROCODONE BITARTRATE AND ACETAMINOPHEN 5; 325 MG/1; MG/1
1 TABLET ORAL EVERY 6 HOURS PRN
Qty: 12 TABLET | Refills: 0 | Status: SHIPPED | OUTPATIENT
Start: 2021-09-13 | End: 2021-09-23 | Stop reason: HOSPADM

## 2021-09-13 RX ORDER — ONDANSETRON 4 MG/1
4 TABLET, FILM COATED ORAL EVERY 8 HOURS PRN
Qty: 10 TABLET | Refills: 0 | Status: SHIPPED | OUTPATIENT
Start: 2021-09-13 | End: 2021-10-21

## 2021-09-13 RX ORDER — HYDROMORPHONE HYDROCHLORIDE 1 MG/ML
0.5 INJECTION, SOLUTION INTRAMUSCULAR; INTRAVENOUS; SUBCUTANEOUS ONCE
Status: COMPLETED | OUTPATIENT
Start: 2021-09-13 | End: 2021-09-13

## 2021-09-13 RX ORDER — HYDROCODONE BITARTRATE AND ACETAMINOPHEN 5; 325 MG/1; MG/1
1 TABLET ORAL ONCE
Status: COMPLETED | OUTPATIENT
Start: 2021-09-13 | End: 2021-09-13

## 2021-09-13 RX ORDER — DOCUSATE SODIUM 100 MG/1
100 CAPSULE, LIQUID FILLED ORAL 2 TIMES DAILY
Qty: 20 CAPSULE | Refills: 0 | Status: SHIPPED | OUTPATIENT
Start: 2021-09-13 | End: 2021-10-21

## 2021-09-13 RX ORDER — ONDANSETRON 4 MG/1
4 TABLET, ORALLY DISINTEGRATING ORAL ONCE
Status: COMPLETED | OUTPATIENT
Start: 2021-09-13 | End: 2021-09-13

## 2021-09-13 RX ADMIN — ONDANSETRON 4 MG: 4 TABLET, ORALLY DISINTEGRATING ORAL at 04:02

## 2021-09-13 RX ADMIN — HYDROCODONE BITARTRATE AND ACETAMINOPHEN 1 TABLET: 5; 325 TABLET ORAL at 05:00

## 2021-09-13 RX ADMIN — HYDROMORPHONE HYDROCHLORIDE 0.5 MG: 1 INJECTION, SOLUTION INTRAMUSCULAR; INTRAVENOUS; SUBCUTANEOUS at 04:02

## 2021-09-13 NOTE — ED TRIAGE NOTES
To ER via EMS from home.  Pt became dizzy and fell walking to the bathroom.  C/o left shoulder pain.  No obvious deformity.  Tenderness to humeral neck area.      Pt in mask at time of triage.  Triage staff in appropriate PPE.       is covid positive.  Pt had negative covid on Friday.

## 2021-09-13 NOTE — ED PROVIDER NOTES
EMERGENCY DEPARTMENT ENCOUNTER    Room Number:  01/01  Date seen:  9/13/2021  Time seen: 03:35 EDT  PCP: Yuniel Bonilla MD  Historian: patient, EMS    HPI:  Chief complaint:left shoulder pain  A complete HPI/ROS/PMH/PSH/SH/FH are unobtainable due to: n/a  Context:Mela Muse is a 88 y.o. female who presents to the ED with c/o moderate left shoulder/upper arm pain after fall at home PTA.  Pain is made worse by movement and better with sitting still.  She states she woke up to use bathroom got a bit lightheaded and fell down hitting her arm on the floor.  She did not hit her head, no LOC and is not on blood thinners.  She denies any loss of sensation to LUE and can minimally shrug her shoulder.  No pain to left elbow, forearm or wrist.     Patient was placed in face mask in first look. Patient was wearing facemask when I entered the room and throughout our encounter. I wore full protective equipment throughout this patient encounter including a face mask, eye shield and gloves. Hand hygiene/washing of hands was performed before donning protective equipment and after removal when leaving the room.    MEDICAL RECORD REVIEW    ALLERGIES  Codeine    PAST MEDICAL HISTORY  Active Ambulatory Problems     Diagnosis Date Noted   • Malignant neoplasm of breast in female, estrogen receptor positive (CMS/[facility]) 01/28/2020   • Vertigo 02/26/2020   • History of skin cancer 02/26/2020   • History of melanoma 02/26/2020   • Osteoporosis 02/26/2020   • Post-menopausal 02/26/2020   • Dizziness 02/27/2020   • Essential hypertension 02/27/2020   • PAD (peripheral artery disease) (CMS/[facility]) 08/10/2018   • Claudication of both lower extremities (CMS/[facility]) 08/10/2018   • Asymptomatic bilateral carotid artery stenosis 12/10/2019   • Osteopenia of hip 05/12/2020   • Other fatigue 08/04/2020   • Medicare annual wellness visit, subsequent 04/08/2021   • Acquired hypothyroidism 04/08/2021   • Left-sided chest pain 04/08/2021     Resolved  Ambulatory Problems     Diagnosis Date Noted   • No Resolved Ambulatory Problems     Past Medical History:   Diagnosis Date   • Anxiety    • Arthritis    • Cancer (CMS/HCC) 2020   • Cancer of skin of leg, right    • Coronary artery disease    • Hemorrhoids    • History of basal cell cancer    • History of headaches    • Hypertension    • Hypothyroidism    • Melanoma (CMS/HCC) 2017   • Skin cancer 2015   • Skin cancer    • Vestibular migraine        PAST SURGICAL HISTORY  Past Surgical History:   Procedure Laterality Date   • BREAST LUMPECTOMY WITH SENTINEL NODE BIOPSY Left 2020    Procedure: BREAST LUMPECTOMY WITH NEEDLE LOCALIZATION;  Surgeon: Kush Plasencia MD;  Location: [facility] OR;  Service: General;  Laterality: Left;   • BREAST SURGERY Left     BIOPSY   • COLONOSCOPY      ALL OK   • COSMETIC SURGERY      AFTER SKIN EXCISION FROM NOSE   • ENDOSCOPY     • EYE SURGERY Bilateral     CATARACTS   • LEG SURGERY Left     MELANOMA EXCISION-WITH SKIN GRAFT   • SKIN BIOPSY     • SKIN CANCER EXCISION Bilateral     NOSE, UPPER LIP   • SKIN GRAFT Bilateral    • VASCULAR SURGERY Bilateral     STENTS IN BOTH UPPER LEGS       FAMILY HISTORY  Family History   Problem Relation Age of Onset   • Heart failure Father    • Skin cancer Father    • Heart disease Father    • Breast cancer Sister 46   • Heart disease Sister    • Esophageal cancer Brother    • Heart disease Brother    • Leukemia Daughter    • Breast cancer Maternal Grandmother         70s   • Colon cancer Maternal Grandmother        SOCIAL HISTORY  Social History     Socioeconomic History   • Marital status:      Spouse name: Vladimir   • Number of children: 4   • Years of education: Not on file   • Highest education level: Not on file   Tobacco Use   • Smoking status: Former Smoker     Quit date:      Years since quittin.7   • Smokeless tobacco: Never Used   Substance and Sexual Activity   • Alcohol use:  Never   • Drug use: Never   • Sexual activity: Defer       REVIEW OF SYSTEMS  Review of Systems    All systems reviewed and negative except for those discussed in HPI.     PHYSICAL EXAM    ED Triage Vitals [09/13/21 0214]   Temp Heart Rate Resp BP SpO2   97.6 °F (36.4 °C) 68 18 127/81 98 %      Temp src Heart Rate Source Patient Position BP Location FiO2 (%)   -- -- -- -- --     Physical Exam    I have reviewed the triage vital signs and nursing notes.      GENERAL: appears uncomfortable  HENT: nares patent  EYES: no scleral icterus  NECK: no ROM limitations  CV: regular rhythm, regular rate, no murmur, no rubs, no gallups  RESPIRATORY: normal effort, CTAB  ABDOMEN: soft  : deferred  MUSCULOSKELETAL: deformity about the proximal humerus on left.  The left elbow, forearm, wrist and hand are normal.  Brachial and radial pulse 2+.  No loss of sensation to LUE.   NEURO: alert, moves all extremities, follows commands  SKIN: warm, dry      LAB RESULTS  No results found for this or any previous visit (from the past 24 hour(s)).      RADIOLOGY RESULTS  XR Humerus Left   Final Result         Electronically signed by Vin Nelson MD on 09-13-21 at 0347            PROGRESS, DATA ANALYSIS, CONSULTS AND MEDICAL DECISION MAKING  All labs have been independently reviewed by me.  All radiology studies have been reviewed by me and discussed with radiologist dictating the report.  EKG's independently viewed and interpreted by me unless stated otherwise. Discussion below represents my analysis of pertinent findings related to patient's condition, differential diagnosis, treatment plan and final disposition.     ED Course as of Sep 13 0509   Mon Sep 13, 2021   0335 I viewed left humerus images in PACS.  My interpretation is proximal humerus fracture.     [EW]   0434 RN spoke with patient's daughter on phone.  She updated daughter on results of imaging and that fracture will be treated non operatively with sling/swath.  Daughter  concerned that her mom needs a covid test.  I have ordered this test.  [facility]  The patient has no complaints of covid.  She has no shortness of breath or hypoxia.  Pt does state the pain shot is helping a bit.  I will order a dose of oral pain medication prior to her leaving.     [EW]      ED Course User Index  [EW] Tammy Cano, APRN     0349:  Pt placed in green sling and swath for comfort.  I discussed with her that she will follow up with Orthopedic Surgery, Dr. Panchal this week but that most of these fractures are non operable.     DDX: proximal left humerus fracture, left shoulder dislocation    MDM:  The patient has left proximal humerus fracture which will be treated with immobilization.  She was placed in sling and swath and pain meds sent to her pharmacy.  She has no loss of sensation and excellent radial pulse palpable.  Pt's  is ill with covid 19.  Pt has no complaints of medical illness.  She states she got dizzy when she got up but has a documented h/o vertigo.  She is not hypoxic here in ER and had negative covid test a few days ago.        Reviewed pt's history and workup with Dr. Acosta.  After a bedside evaluation, Dr. Acosta agrees with the plan of care.    The patient's history, physical exam, and lab findings were discussed with the physician, who also performed a face to face history and physical exam.  I discussed all results and noted any abnormalities with patient.  Discussed absoute need to recheck abnormalities with their family physician.  I answered any of the patient's questions.  Discussed plan for discharge, as there is no emergent indication for admission.  Pt is agreeable and understands need for follow up and repeat testing.  Pt is aware that discharge does not mean that nothing is wrong but it indicates no emergency is present and they must continue care with their family physician.  Pt is discharged with instructions to follow up with  "primary care doctor to have their blood pressure rechecked.           Disposition vitals:  /65 (BP Location: Right arm, Patient Position: Sitting)   Pulse 68   Temp 97.6 °F (36.4 °C)   Resp 18   Ht 165.1 cm (65\")   SpO2 98%   BMI 22.63 kg/m²       DIAGNOSIS  Final diagnoses:   Other closed displaced fracture of proximal end of left humerus, initial encounter   Exposure to COVID-19 virus       FOLLOW UP   Sherman Panchal MD  [address] [place] [address]  [phone]    Schedule an appointment as soon as possible for a visit            Tammy Cano APRN  09/13/21 0508       Tammy Cano APRN  09/13/21 0509    "

## 2021-09-13 NOTE — ED PROVIDER NOTES
The LARRY and I have discussed this patients history, physical exam, and treatment plan. I have reviewed the documentation and personally had a face to face interaction with the patient. I affirm the documentation and agree with the treatment and plan.  The following note describes my personal findings    This patient is an 88-year-old female presenting to the emergency room today with left shoulder pain following a fall at home tonight.  She states that the pain is made worse by any movement of the left shoulder.    Exam: This patient is moderately uncomfortable but without gross neurological deficit.  She is afebrile with stable vital signs and nontoxic in appearance.  Examination of the left upper extremity does show a close deformity to that left upper extremity with intact deltoid sensation.  Range of motion not tested secondary to the pain.    Plan: We will obtain an x-ray of the left shoulder and place the patient in a sling/swathe for comfort care.  We will reassess following.      The patient was wearing a facemask upon entrance into the room and remained in such throughout their visit.  I was wearing PPE including a facemask, eye protection, as well as gloves at any point entering the room and throughout the visit     Ashish Acosat MD  09/13/21 0537

## 2021-09-13 NOTE — TELEPHONE ENCOUNTER
PATIENTS DAUGHTER KEYSHA IS CALLING BECAUSE PATIENT HAS TESTED POSITIVE FOR COVID AND WOULD LIKE TO GET THE INFUSION BUT NEEDS AN ORDER. PATIENTS  ALREADY GOT THE INFUSION.       KEYSHA CAN BE REACHED AT: [phone]

## 2021-09-13 NOTE — TELEPHONE ENCOUNTER
Tell her I have faxed the order over to the hospital and they will be in contact in the next 48 hours.

## 2021-09-13 NOTE — DISCHARGE INSTRUCTIONS
Ice frequently to left shoulder    Take pain medication as needed, but sparingly    Use nausea medication (Zofran) if pain medication causes nausea    Take stool softener while on prescription pain medication    Although you are being discharged from the ED today, I encourage you to return for worsening symptoms. Things can, and do, change such that treatment at home with medication may not be adequate. Specifically I recommend returning for chest pain or discomfort, difficulty breathing, persistent vomiting or difficulty holding down liquids or medications, fever > 102.0 F, or any other worsening or alarming symptoms.     Rest. Drink plenty of fluids.  Follow up with PCP or provider listed for further evaluation and management.  Follow up with primary care provider for further management and to have blood pressure rechecked.  Take all medications as prescribed.

## 2021-09-13 NOTE — ED NOTES
I wore full protective equipment throughout this patient encounter including a face mask, eye shield and gloves. Hand hygiene/washing of hands was performed before donning protective equipment and after removal when leaving the room.       Sendy Pina RN  09/13/21 0401

## 2021-09-14 ENCOUNTER — HOSPITAL ENCOUNTER (EMERGENCY)
Facility: HOSPITAL | Age: 86
Discharge: HOME | End: 2021-09-14
Attending: EMERGENCY MEDICINE | Admitting: EMERGENCY MEDICINE

## 2021-09-14 ENCOUNTER — TELEPHONE (OUTPATIENT)
Dept: ORTHOPEDIC SURGERY | Facility: CLINIC | Age: 86
End: 2021-09-14

## 2021-09-14 ENCOUNTER — APPOINTMENT (OUTPATIENT)
Dept: GENERAL RADIOLOGY | Facility: HOSPITAL | Age: 86
End: 2021-09-14

## 2021-09-14 VITALS
OXYGEN SATURATION: 96 % | DIASTOLIC BLOOD PRESSURE: 78 MMHG | RESPIRATION RATE: 17 BRPM | HEART RATE: 66 BPM | SYSTOLIC BLOOD PRESSURE: 118 MMHG | TEMPERATURE: 97.8 F

## 2021-09-14 DIAGNOSIS — U07.1 COVID-19: Primary | ICD-10-CM

## 2021-09-14 DIAGNOSIS — S42.295A OTHER CLOSED NONDISPLACED FRACTURE OF PROXIMAL END OF LEFT HUMERUS, INITIAL ENCOUNTER: ICD-10-CM

## 2021-09-14 LAB
ALBUMIN SERPL-MCNC: 3.7 G/DL (ref 3.5–5.2)
ALBUMIN/GLOB SERPL: 1.3 G/DL
ALP SERPL-CCNC: 53 U/L (ref 39–117)
ALT SERPL W P-5'-P-CCNC: 9 U/L (ref 1–33)
ANION GAP SERPL CALCULATED.3IONS-SCNC: 9.5 MMOL/L (ref 5–15)
AST SERPL-CCNC: 16 U/L (ref 1–32)
BASOPHILS # BLD AUTO: 0.02 10*3/MM3 (ref 0–0.2)
BASOPHILS NFR BLD AUTO: 0.4 % (ref 0–1.5)
BILIRUB SERPL-MCNC: 0.3 MG/DL (ref 0–1.2)
BUN SERPL-MCNC: 26 MG/DL (ref 8–23)
BUN/CREAT SERPL: 25.5 (ref 7–25)
CALCIUM SPEC-SCNC: 8.6 MG/DL (ref 8.6–10.5)
CHLORIDE SERPL-SCNC: 99 MMOL/L (ref 98–107)
CO2 SERPL-SCNC: 26.5 MMOL/L (ref 22–29)
CREAT SERPL-MCNC: 1.02 MG/DL (ref 0.57–1)
DEPRECATED RDW RBC AUTO: 41.7 FL (ref 37–54)
EOSINOPHIL # BLD AUTO: 0.01 10*3/MM3 (ref 0–0.4)
EOSINOPHIL NFR BLD AUTO: 0.2 % (ref 0.3–6.2)
ERYTHROCYTE [DISTWIDTH] IN BLOOD BY AUTOMATED COUNT: 12.6 % (ref 12.3–15.4)
GFR SERPL CREATININE-BSD FRML MDRD: 51 ML/MIN/1.73
GLOBULIN UR ELPH-MCNC: 2.9 GM/DL
GLUCOSE SERPL-MCNC: 99 MG/DL (ref 65–99)
HCT VFR BLD AUTO: 36.6 % (ref 34–46.6)
HGB BLD-MCNC: 12.1 G/DL (ref 12–15.9)
IMM GRANULOCYTES # BLD AUTO: 0.02 10*3/MM3 (ref 0–0.05)
IMM GRANULOCYTES NFR BLD AUTO: 0.4 % (ref 0–0.5)
LYMPHOCYTES # BLD AUTO: 0.78 10*3/MM3 (ref 0.7–3.1)
LYMPHOCYTES NFR BLD AUTO: 13.7 % (ref 19.6–45.3)
MCH RBC QN AUTO: 30 PG (ref 26.6–33)
MCHC RBC AUTO-ENTMCNC: 33.1 G/DL (ref 31.5–35.7)
MCV RBC AUTO: 90.6 FL (ref 79–97)
MONOCYTES # BLD AUTO: 0.67 10*3/MM3 (ref 0.1–0.9)
MONOCYTES NFR BLD AUTO: 11.8 % (ref 5–12)
NEUTROPHILS NFR BLD AUTO: 4.2 10*3/MM3 (ref 1.7–7)
NEUTROPHILS NFR BLD AUTO: 73.5 % (ref 42.7–76)
NRBC BLD AUTO-RTO: 0 /100 WBC (ref 0–0.2)
PLATELET # BLD AUTO: 281 10*3/MM3 (ref 140–450)
PMV BLD AUTO: 9.5 FL (ref 6–12)
POTASSIUM SERPL-SCNC: 4.4 MMOL/L (ref 3.5–5.2)
PROT SERPL-MCNC: 6.6 G/DL (ref 6–8.5)
RBC # BLD AUTO: 4.04 10*6/MM3 (ref 3.77–5.28)
SODIUM SERPL-SCNC: 135 MMOL/L (ref 136–145)
WBC # BLD AUTO: 5.7 10*3/MM3 (ref 3.4–10.8)

## 2021-09-14 PROCEDURE — 99284 EMERGENCY DEPT VISIT MOD MDM: CPT

## 2021-09-14 PROCEDURE — 71045 X-RAY EXAM CHEST 1 VIEW: CPT

## 2021-09-14 PROCEDURE — 80053 COMPREHEN METABOLIC PANEL: CPT | Performed by: NURSE PRACTITIONER

## 2021-09-14 PROCEDURE — 85025 COMPLETE CBC W/AUTO DIFF WBC: CPT | Performed by: NURSE PRACTITIONER

## 2021-09-14 RX ORDER — METHYLPREDNISOLONE SODIUM SUCCINATE 125 MG/2ML
125 INJECTION, POWDER, LYOPHILIZED, FOR SOLUTION INTRAMUSCULAR; INTRAVENOUS AS NEEDED
Status: CANCELLED | OUTPATIENT
Start: 2021-09-15

## 2021-09-14 RX ORDER — SODIUM CHLORIDE 0.9 % (FLUSH) 0.9 %
10 SYRINGE (ML) INJECTION AS NEEDED
Status: DISCONTINUED | OUTPATIENT
Start: 2021-09-14 | End: 2021-09-14 | Stop reason: HOSPADM

## 2021-09-14 RX ORDER — DIPHENHYDRAMINE HYDROCHLORIDE 50 MG/ML
50 INJECTION INTRAMUSCULAR; INTRAVENOUS AS NEEDED
Status: CANCELLED | OUTPATIENT
Start: 2021-09-15

## 2021-09-14 RX ORDER — EPINEPHRINE 1 MG/ML
0.3 INJECTION, SOLUTION, CONCENTRATE INTRAVENOUS AS NEEDED
Status: CANCELLED | OUTPATIENT
Start: 2021-09-15

## 2021-09-14 RX ORDER — SODIUM CHLORIDE 9 MG/ML
30 INJECTION, SOLUTION INTRAVENOUS ONCE
Status: CANCELLED | OUTPATIENT
Start: 2021-09-15

## 2021-09-14 RX ADMIN — SODIUM CHLORIDE 1000 ML: 9 INJECTION, SOLUTION INTRAVENOUS at 13:59

## 2021-09-14 NOTE — DISCHARGE INSTRUCTIONS
Continue current home medications  Rest, activity as tolerated, monitor your O2 saturations closely especially if you become short of breath  Keep scheduled appointment with your orthopedic doctor tomorrow  Increase fluids  Follow-up with PMD as needed  Return to the ER for fever, chills, chest pain, shortness of breath, dizziness, weakness or any new or worsening symptoms

## 2021-09-14 NOTE — ED PROVIDER NOTES
Pt presents to the ED c/o  low oxygen levels at home on a home pulse ox.  She denies feeling short of breath.  She was diagnosed with COVID-19 on Sunday.  She has also been taking pain medication for a broken arm diagnosed on Sunday.  She has been vaccinated against COVID-19.     On exam,   Awake and alert, no acute distress.  Lungs clear to auscultation bilaterally, normal work of breathing.  Left upper extremity is immobilized in a shoulder immobilizer.       Plan: Chest x-ray reviewed in PACS and is essentially clear.  O2 saturations here have remained greater than 97% even with ambulation.  We will obtain basic labs but I anticipate discharge home with continued monitoring and return precautions.      I wore an N95 mask, face shield, and gloves during this patient encounter.  Patient also wearing a surgical mask.  Hand hygeine performed before and after seeing the patient.     Attestation:  The LARRY and I have discussed this patient's history, physical exam, and treatment plan.  I have reviewed the documentation and personally had a face to face interaction with the patient. I affirm the documentation and agree with the treatment and plan.  The attached note describes my personal findings.            Homer Díaz MD  09/14/21 3770

## 2021-09-14 NOTE — ED NOTES
Patient was wearing a face mask throughout encounter.  I wore personal protective equipment throughout the encounter.  Hand hygiene was performed before and after patient encounter.        Dariana Licea RN  09/14/21 9681

## 2021-09-14 NOTE — ED PROVIDER NOTES
EMERGENCY DEPARTMENT ENCOUNTER    Room Number:  A03/03  Date of encounter:  9/14/2021  PCP: Yuniel Bonilla MD  Historian: Patient      PPE    Patient was placed in face mask in first look. Patient was wearing facemask when I entered the room and throughout our encounter. I wore full protective equipment throughout this patient encounter including a CAPR, face mask, eye shield, gown and gloves. Hand hygiene was performed before donning protective equipment and after removal when leaving the room.        HPI:  Chief Complaint: COVID-19  A complete HPI/ROS/PMH/PSH/SH/FH are unobtainable due to: Nothing    Context: Mela Muse is a 88 y.o. female who arrives to the ED via EMS from home where she was with her .  Patient states that she was diagnosed with COVID-19 on Sunday, she states that she was sent home with an O2 saturation monitor.  She states that her daughter has been checking it at home and her sats have been 88%.  Patient denies being short of breath, denies fever, chills, chest pain, cough or any other symptoms.  She states she has had decreased urine output and feels she may be a little dehydrated because she has not been eating and drinking as much as she normally does.  She states she is currently taking pain medication for her broken arm that she was also diagnosed with on Sunday.  Patient states she did receive the Pfizer Covid vaccine in February and March.        PAST MEDICAL HISTORY  Active Ambulatory Problems     Diagnosis Date Noted   • Malignant neoplasm of breast in female, estrogen receptor positive (CMS/[facility]) 01/28/2020   • Vertigo 02/26/2020   • History of skin cancer 02/26/2020   • History of melanoma 02/26/2020   • Osteoporosis 02/26/2020   • Post-menopausal 02/26/2020   • Dizziness 02/27/2020   • Essential hypertension 02/27/2020   • PAD (peripheral artery disease) (CMS/[facility]) 08/10/2018   • Claudication of both lower extremities (CMS/[facility]) 08/10/2018   • Asymptomatic bilateral  carotid artery stenosis 12/10/2019   • Osteopenia of hip 05/12/2020   • Other fatigue 08/04/2020   • Medicare annual wellness visit, subsequent 04/08/2021   • Acquired hypothyroidism 04/08/2021   • Left-sided chest pain 04/08/2021   • COVID-19 09/14/2021     Resolved Ambulatory Problems     Diagnosis Date Noted   • No Resolved Ambulatory Problems     Past Medical History:   Diagnosis Date   • Anxiety    • Arthritis    • Cancer (CMS/HCC) 01/2020   • Cancer of skin of leg, right 2021   • Coronary artery disease    • Hemorrhoids    • History of basal cell cancer    • History of headaches    • Hypertension    • Hypothyroidism    • Melanoma (CMS/[facility]) 2017   • Skin cancer 2015   • Skin cancer 2008   • Vestibular migraine 2021         PAST SURGICAL HISTORY  Past Surgical History:   Procedure Laterality Date   • BREAST LUMPECTOMY WITH SENTINEL NODE BIOPSY Left 2/5/2020    Procedure: BREAST LUMPECTOMY WITH NEEDLE LOCALIZATION;  Surgeon: Kush Plasencia MD;  Location: [facility];  Service: General;  Laterality: Left;   • BREAST SURGERY Left 2020    BIOPSY   • COLONOSCOPY      ALL OK   • COSMETIC SURGERY      AFTER SKIN EXCISION FROM NOSE   • ENDOSCOPY     • EYE SURGERY Bilateral     CATARACTS   • LEG SURGERY Left 2017    MELANOMA EXCISION-WITH SKIN GRAFT   • SKIN BIOPSY  2008   • SKIN CANCER EXCISION Bilateral 2015    NOSE, UPPER LIP   • SKIN GRAFT Bilateral    • VASCULAR SURGERY Bilateral 2018    STENTS IN BOTH UPPER LEGS         FAMILY HISTORY  Family History   Problem Relation Age of Onset   • Heart failure Father    • Skin cancer Father    • Heart disease Father    • Breast cancer Sister 46   • Heart disease Sister    • Esophageal cancer Brother    • Heart disease Brother    • Leukemia Daughter    • Breast cancer Maternal Grandmother         70s   • Colon cancer Maternal Grandmother          SOCIAL HISTORY  Social History     Socioeconomic History   • Marital status:      Spouse name: Vladimir   • Number of  children: 4   • Years of education: Not on file   • Highest education level: Not on file   Tobacco Use   • Smoking status: Former Smoker     Quit date: 1970     Years since quittin.7   • Smokeless tobacco: Never Used   Substance and Sexual Activity   • Alcohol use: Never   • Drug use: Never   • Sexual activity: Defer         ALLERGIES  Codeine        REVIEW OF SYSTEMS  Review of Systems     All systems reviewed and negative except for those discussed in HPI.        PHYSICAL EXAM    ED Triage Vitals [21 1246]   Temp Heart Rate Resp BP SpO2   97.8 °F (36.6 °C) 70 16 124/85 98 %       Physical Exam  GENERAL: Well appearing, non-toxic appearing, not distressed  HENT: normocephalic, atraumatic  EYES: no scleral icterus, PERRL  CV: regular rhythm, regular rate, no murmur  RESPIRATORY: normal effort, CTAB  ABDOMEN: soft, nontender  MUSCULOSKELETAL: no deformity  NEURO: alert, moves all extremities, follows commands, mental status normal/baseline  SKIN: warm, dry, no rash   Psych: Appropriate mood and affect  Nursing notes and vital signs reviewed      LAB RESULTS  Recent Results (from the past 24 hour(s))   Comprehensive Metabolic Panel    Collection Time: 21  1:59 PM    Specimen: Arm, Right; Blood   Result Value Ref Range    Glucose 99 65 - 99 mg/dL    BUN 26 (H) 8 - 23 mg/dL    Creatinine 1.02 (H) 0.57 - 1.00 mg/dL    Sodium 135 (L) 136 - 145 mmol/L    Potassium 4.4 3.5 - 5.2 mmol/L    Chloride 99 98 - 107 mmol/L    CO2 26.5 22.0 - 29.0 mmol/L    Calcium 8.6 8.6 - 10.5 mg/dL    Total Protein 6.6 6.0 - 8.5 g/dL    Albumin 3.70 3.50 - 5.20 g/dL    ALT (SGPT) 9 1 - 33 U/L    AST (SGOT) 16 1 - 32 U/L    Alkaline Phosphatase 53 39 - 117 U/L    Total Bilirubin 0.3 0.0 - 1.2 mg/dL    eGFR Non African Amer 51 (L) >60 mL/min/1.73    Globulin 2.9 gm/dL    A/G Ratio 1.3 g/dL    BUN/Creatinine Ratio 25.5 (H) 7.0 - 25.0    Anion Gap 9.5 5.0 - 15.0 mmol/L   CBC Auto Differential    Collection Time: 21  1:59 PM     Specimen: Blood   Result Value Ref Range    WBC 5.70 3.40 - 10.80 10*3/mm3    RBC 4.04 3.77 - 5.28 10*6/mm3    Hemoglobin 12.1 12.0 - 15.9 g/dL    Hematocrit 36.6 34.0 - 46.6 %    MCV 90.6 79.0 - 97.0 fL    MCH 30.0 26.6 - 33.0 pg    MCHC 33.1 31.5 - 35.7 g/dL    RDW 12.6 12.3 - 15.4 %    RDW-SD 41.7 37.0 - 54.0 fl    MPV 9.5 6.0 - 12.0 fL    Platelets 281 140 - 450 10*3/mm3    Neutrophil % 73.5 42.7 - 76.0 %    Lymphocyte % 13.7 (L) 19.6 - 45.3 %    Monocyte % 11.8 5.0 - 12.0 %    Eosinophil % 0.2 (L) 0.3 - 6.2 %    Basophil % 0.4 0.0 - 1.5 %    Immature Grans % 0.4 0.0 - 0.5 %    Neutrophils, Absolute 4.20 1.70 - 7.00 10*3/mm3    Lymphocytes, Absolute 0.78 0.70 - 3.10 10*3/mm3    Monocytes, Absolute 0.67 0.10 - 0.90 10*3/mm3    Eosinophils, Absolute 0.01 0.00 - 0.40 10*3/mm3    Basophils, Absolute 0.02 0.00 - 0.20 10*3/mm3    Immature Grans, Absolute 0.02 0.00 - 0.05 10*3/mm3    nRBC 0.0 0.0 - 0.2 /100 WBC       Ordered the above labs and independently reviewed the results.      RADIOLOGY  XR Chest AP    Result Date: 9/14/2021  ONE VIEW PORTABLE CHEST  HISTORY: Positive COVID-19 infection. Cough and fever.  FINDINGS: The lungs are well-expanded and clear and no definite pneumonia is currently seen. There is mild cardiomegaly.  This report was finalized on 9/14/2021 2:47 PM by Dr. Kenneth Koo M.D.        I ordered the above noted radiological studies and viewed the images on the PACS system.         MEDICAL RECORD REVIEW  Medical records reviewed in [facility], patient was seen on 913 for left shoulder pain after a fall.  She was diagnosed with a proximal left humerus fracture and COVID-19.      PROCEDURES    Procedures        DIFFERENTIAL DIAGNOSIS  Differential diagnosis include were not limited to the following: COVID-19, COVID-19 pneumonia, hypoxia, dizziness, urinary tract infection, dehydration      PROGRESS, DATA ANALYSIS, CONSULTS, AND MEDICAL DECISION MAKING        ED Course as of Sep 14 1533   Tue Sep 14,  2021 1403 Patient is well-appearing, her O2 sats on room air initially were 97%.  We did ambulate the patient in the room and her sats remained at 97%.  She did not become tachypneic or short of breath.  Will check basic labs and a chest x-ray.  If work-up is unremarkable patient will be discharged home in stable condition with close follow-up with the orthopedic MD and her PMD.    [MS]   1446 Reviewed pt's history and workup with Dr. Díaz.  After a bedside evaluation, they agree with the plan of care.        [MS]   1508 I viewed the patient's chest xray imaging in PACS.  My interpretation is no infiltrate or bony abnormality.  See dictation for official radiology interpretation.        [MS]   1509 Patient updated on unremarkable work-up done here today.  She was advised to keep her appointment with her orthopedic doctor tomorrow.  She was given strict return to ER precautions regarding her COVID-19.  She is well-appearing, stable condition, has not been tachycardic, O2 saturations remained at 97% on room air she is stable for discharge at this time.    [MS]      ED Course User Index  [MS] Jazmyne Tapia APRN     Discussed plan for discharge, as there is no emergent indication for admission. Pt/family is agreeable and understands need for follow up and repeat testing.  Pt is aware that discharge does not mean that nothing is wrong but it indicates no emergency is present that requires admission and they must continue care with follow-up as given below or physician of their choice.   Patient/Family voiced understanding of above instructions.  Patient discharged in stable condition.    DIAGNOSIS  Final diagnoses:   COVID-19   Other closed nondisplaced fracture of proximal end of left humerus, initial encounter       FOLLOW UP   Yuniel Bonilla MD  [address]  [address]  [facility] [address]  [phone]    Schedule an appointment as soon as possible for a visit   If symptoms  worsen      RX     Medication List      No changes were made to your prescriptions during this visit.           MEDICATIONS GIVEN IN ED    Medications   sodium chloride 0.9 % flush 10 mL (has no administration in time range)   sodium chloride 0.9 % bolus 1,000 mL (1,000 mL Intravenous New Bag 9/14/21 6053)           COURSE & MEDICAL DECISION MAKING  Any/All labs and Any/All Imaging studies that were ordered were reviewed and are noted above.  Results were reviewed/discussed with the patient and they were also made aware of online access.    Pt also made aware that some labs, such as cultures, will not be resulted during ER visit and follow up with PMD is necessary.        Jazmyne Tapia, APRN  09/14/21 1539

## 2021-09-14 NOTE — ED NOTES
Patient from home via EMS; call was for low oxygen saturations. Patient was seen at this facility on Sunday and dx with COVID-19. Family states she would check patients oxygen saturation on the hour and noticed readings would sometimes get in the mid 80s. EMS states patient has not required any oxygenation with saturations staying above 95%.      Maria Isabel Dow RN  09/14/21 6881

## 2021-09-14 NOTE — TELEPHONE ENCOUNTER
Patient's daughter is calling and cancelling her appointment for tomorrow as the patient has tested positive for covid. The patient has a left humerus fracture and they wanted to know what they should do for it in the meantime and when she should come back in for this to be seen. Please advise.

## 2021-09-15 ENCOUNTER — HOSPITAL ENCOUNTER (OUTPATIENT)
Dept: INFUSION THERAPY | Facility: HOSPITAL | Age: 86
Discharge: HOME | End: 2021-09-15
Admitting: OBSTETRICS & GYNECOLOGY

## 2021-09-15 ENCOUNTER — TELEPHONE (OUTPATIENT)
Dept: ORTHOPEDIC SURGERY | Facility: CLINIC | Age: 86
End: 2021-09-15

## 2021-09-15 VITALS
SYSTOLIC BLOOD PRESSURE: 119 MMHG | RESPIRATION RATE: 18 BRPM | TEMPERATURE: 98.2 F | OXYGEN SATURATION: 95 % | HEART RATE: 61 BPM | DIASTOLIC BLOOD PRESSURE: 58 MMHG

## 2021-09-15 DIAGNOSIS — U07.1 COVID-19: Primary | ICD-10-CM

## 2021-09-15 PROCEDURE — M0243 CASIRIVI AND IMDEVI INFUSION: HCPCS | Performed by: OBSTETRICS & GYNECOLOGY

## 2021-09-15 PROCEDURE — 96365 THER/PROPH/DIAG IV INF INIT: CPT

## 2021-09-15 PROCEDURE — 25010000006 INJECTION, CASIRIVIMAB AND IMDEVIMAB, 1200 MG: Performed by: OBSTETRICS & GYNECOLOGY

## 2021-09-15 RX ORDER — SODIUM CHLORIDE 9 MG/ML
30 INJECTION, SOLUTION INTRAVENOUS ONCE
Status: CANCELLED | OUTPATIENT
Start: 2021-09-15

## 2021-09-15 RX ORDER — METHYLPREDNISOLONE SODIUM SUCCINATE 125 MG/2ML
125 INJECTION, POWDER, LYOPHILIZED, FOR SOLUTION INTRAMUSCULAR; INTRAVENOUS AS NEEDED
Status: CANCELLED | OUTPATIENT
Start: 2021-09-15

## 2021-09-15 RX ORDER — SODIUM CHLORIDE 9 MG/ML
30 INJECTION, SOLUTION INTRAVENOUS ONCE
Status: COMPLETED | OUTPATIENT
Start: 2021-09-15 | End: 2021-09-15

## 2021-09-15 RX ORDER — DIPHENHYDRAMINE HYDROCHLORIDE 50 MG/ML
50 INJECTION INTRAMUSCULAR; INTRAVENOUS AS NEEDED
Status: CANCELLED | OUTPATIENT
Start: 2021-09-15

## 2021-09-15 RX ORDER — METHYLPREDNISOLONE SODIUM SUCCINATE 125 MG/2ML
125 INJECTION, POWDER, LYOPHILIZED, FOR SOLUTION INTRAMUSCULAR; INTRAVENOUS AS NEEDED
Status: DISCONTINUED | OUTPATIENT
Start: 2021-09-15 | End: 2021-09-17 | Stop reason: HOSPADM

## 2021-09-15 RX ORDER — DIPHENHYDRAMINE HYDROCHLORIDE 50 MG/ML
50 INJECTION INTRAMUSCULAR; INTRAVENOUS AS NEEDED
Status: DISCONTINUED | OUTPATIENT
Start: 2021-09-15 | End: 2021-09-17 | Stop reason: HOSPADM

## 2021-09-15 RX ADMIN — CASIRIVIMAB AND IMDEVIMAB: 600; 600 INJECTION, SOLUTION, CONCENTRATE INTRAVENOUS at 17:54

## 2021-09-15 RX ADMIN — SODIUM CHLORIDE 30 ML: 9 INJECTION, SOLUTION INTRAVENOUS at 17:54

## 2021-09-15 NOTE — TELEPHONE ENCOUNTER
I had a lengthy conversation with her daughter.  I reviewed the x-rays.  She has a displaced proximal humerus fracture.  The risk of nonunion with this particular fracture is very high.  I think she needs a plate.  Unfortunately, with her mother being Covid positive, I cannot evaluate her in the office.  We need to wait 2 weeks until she comes out of quarantine.  They do not want to have to wait that timeframe.  They want to go ahead and get set up for the surgery.  I told her that I will have to see if that is an option.  Obviously, the downside is that I will not be able to meet with her mother until she comes into the hospital for the surgery and even then it will probably be in the operating room.  This is obviously a less than ideal situation.  The daughter tells me that she is comfortable with that.  She is going to talk it over with her mother and father make sure that they are comfortable as well.  She would like me to get the ball rolling with getting the surgery scheduled.  I told her that I cannot make any guarantees but I will see what we can do.

## 2021-09-15 NOTE — PROGRESS NOTES
Patient provided with Fact Sheet for Patients, Parents and Caregivers Emergency Use Authorization (EUA) of Casirivimab / Imdevimab for Coronavirus Disease 2019 (COVID-19) form.    Reviewed and patient verbalized understanding.  Appropriate PPE worn during the care of the patient.  Advised patient not to receive Covid vaccine for 90 days.  Patient tolerated infusion without s/s of reaction. Patient discharged via wc per staff to vehicle.

## 2021-09-16 ENCOUNTER — TELEPHONE (OUTPATIENT)
Dept: ORTHOPEDIC SURGERY | Facility: CLINIC | Age: 86
End: 2021-09-16

## 2021-09-16 NOTE — TELEPHONE ENCOUNTER
Hub staff attempted to follow warm transfer process and was unsuccessful     Caller: DR STEFANY HARRELL    Best call back number: [phone] (CELL PHONE)    PROVIDER WOULD LIKE TO TALK TO DR SANDOVAL OR HIS MEDICAL STAFF REGARDING PATIENT. SHE ATTEMPTED TO REACH VIA THE PROVIDER OPTION AND RECEIVED NO ANSWER.

## 2021-09-16 NOTE — TELEPHONE ENCOUNTER
Patient and Nai Crawley (patients daughter) called and gave permission to talk to Dr. Esme Green  (patients Daughter-in-Law) about patients care.

## 2021-09-16 NOTE — TELEPHONE ENCOUNTER
I spoke with her daughter-in-law who is an OB/GYN.  We discussed the situation.  They do want to move forward with surgery.  I will have my  contact him about scheduling the surgery for next week.

## 2021-09-17 ENCOUNTER — PREP FOR SURGERY (OUTPATIENT)
Dept: OTHER | Facility: HOSPITAL | Age: 86
End: 2021-09-17

## 2021-09-17 DIAGNOSIS — S42.292A OTHER CLOSED DISPLACED FRACTURE OF PROXIMAL END OF LEFT HUMERUS, INITIAL ENCOUNTER: Primary | ICD-10-CM

## 2021-09-17 PROBLEM — S42.202A CLOSED FRACTURE OF LEFT PROXIMAL HUMERUS: Status: ACTIVE | Noted: 2021-09-17

## 2021-09-17 RX ORDER — CEFAZOLIN SODIUM 2 G/100ML
2 INJECTION, SOLUTION INTRAVENOUS ONCE
Status: CANCELLED | OUTPATIENT
Start: 2021-09-23 | End: 2021-09-17

## 2021-09-20 DIAGNOSIS — S42.292A OTHER CLOSED DISPLACED FRACTURE OF PROXIMAL END OF LEFT HUMERUS, INITIAL ENCOUNTER: Primary | ICD-10-CM

## 2021-09-21 ENCOUNTER — TRANSCRIBE ORDERS (OUTPATIENT)
Dept: LAB | Facility: HOSPITAL | Age: 86
End: 2021-09-21

## 2021-09-21 ENCOUNTER — LAB (OUTPATIENT)
Dept: LAB | Facility: HOSPITAL | Age: 86
End: 2021-09-21

## 2021-09-21 ENCOUNTER — HOSPITAL ENCOUNTER (OUTPATIENT)
Dept: CARDIOLOGY | Facility: HOSPITAL | Age: 86
Discharge: HOME | End: 2021-09-21

## 2021-09-21 DIAGNOSIS — S42.292A OTHER CLOSED DISPLACED FRACTURE OF PROXIMAL END OF LEFT HUMERUS, INITIAL ENCOUNTER: ICD-10-CM

## 2021-09-21 DIAGNOSIS — Z01.818 PRE-OP EXAM: ICD-10-CM

## 2021-09-21 DIAGNOSIS — Z01.818 PRE-OP EXAM: Primary | ICD-10-CM

## 2021-09-21 LAB
QT INTERVAL: 460 MS
SARS-COV-2 ORF1AB RESP QL NAA+PROBE: DETECTED

## 2021-09-21 PROCEDURE — 93005 ELECTROCARDIOGRAM TRACING: CPT | Performed by: NURSE PRACTITIONER

## 2021-09-21 PROCEDURE — 93010 ELECTROCARDIOGRAM REPORT: CPT | Performed by: INTERNAL MEDICINE

## 2021-09-21 PROCEDURE — C9803 HOPD COVID-19 SPEC COLLECT: HCPCS

## 2021-09-21 PROCEDURE — U0005 INFEC AGEN DETEC AMPLI PROBE: HCPCS

## 2021-09-21 PROCEDURE — U0004 COV-19 TEST NON-CDC HGH THRU: HCPCS

## 2021-09-22 ENCOUNTER — PREP FOR SURGERY (OUTPATIENT)
Dept: OTHER | Facility: HOSPITAL | Age: 86
End: 2021-09-22

## 2021-09-22 RX ORDER — ASPIRIN 81 MG/1
81 TABLET, CHEWABLE ORAL DAILY
COMMUNITY

## 2021-09-23 ENCOUNTER — HOSPITAL ENCOUNTER (OUTPATIENT)
Facility: HOSPITAL | Age: 86
Setting detail: HOSPITAL OUTPATIENT SURGERY
Discharge: HOME | End: 2021-09-23
Attending: ORTHOPAEDIC SURGERY | Admitting: ORTHOPAEDIC SURGERY

## 2021-09-23 ENCOUNTER — ANESTHESIA EVENT (OUTPATIENT)
Dept: PERIOP | Facility: HOSPITAL | Age: 86
End: 2021-09-23

## 2021-09-23 ENCOUNTER — APPOINTMENT (OUTPATIENT)
Dept: GENERAL RADIOLOGY | Facility: HOSPITAL | Age: 86
End: 2021-09-23

## 2021-09-23 ENCOUNTER — ANESTHESIA (OUTPATIENT)
Dept: PERIOP | Facility: HOSPITAL | Age: 86
End: 2021-09-23

## 2021-09-23 VITALS
BODY MASS INDEX: 21.33 KG/M2 | OXYGEN SATURATION: 95 % | DIASTOLIC BLOOD PRESSURE: 52 MMHG | TEMPERATURE: 97.9 F | HEIGHT: 66 IN | WEIGHT: 132.72 LBS | RESPIRATION RATE: 16 BRPM | HEART RATE: 66 BPM | SYSTOLIC BLOOD PRESSURE: 95 MMHG

## 2021-09-23 DIAGNOSIS — S42.292A OTHER CLOSED DISPLACED FRACTURE OF PROXIMAL END OF LEFT HUMERUS, INITIAL ENCOUNTER: ICD-10-CM

## 2021-09-23 DIAGNOSIS — S42.202A CLOSED FRACTURE OF PROXIMAL END OF LEFT HUMERUS, UNSPECIFIED FRACTURE MORPHOLOGY, INITIAL ENCOUNTER: Primary | ICD-10-CM

## 2021-09-23 LAB — QT INTERVAL: 437 MS

## 2021-09-23 PROCEDURE — 76000 FLUOROSCOPY <1 HR PHYS/QHP: CPT

## 2021-09-23 PROCEDURE — C1713 ANCHOR/SCREW BN/BN,TIS/BN: HCPCS | Performed by: ORTHOPAEDIC SURGERY

## 2021-09-23 PROCEDURE — 23615 OPTX PROX HUMRL FX W/INT FIX: CPT | Performed by: ORTHOPAEDIC SURGERY

## 2021-09-23 PROCEDURE — 25010000002 ONDANSETRON PER 1 MG: Performed by: ANESTHESIOLOGY

## 2021-09-23 PROCEDURE — 23615 OPTX PROX HUMRL FX W/INT FIX: CPT | Performed by: NURSE PRACTITIONER

## 2021-09-23 PROCEDURE — 25010000002 DEXAMETHASONE PER 1 MG: Performed by: ANESTHESIOLOGY

## 2021-09-23 PROCEDURE — 25010000002 FENTANYL CITRATE (PF) 50 MCG/ML SOLUTION: Performed by: ANESTHESIOLOGY

## 2021-09-23 PROCEDURE — 25010000002 PHENYLEPHRINE PER 1 ML: Performed by: ANESTHESIOLOGY

## 2021-09-23 PROCEDURE — 25010000003 BUPIVACAINE LIPOSOME 1.3 % SUSPENSION: Performed by: ANESTHESIOLOGY

## 2021-09-23 PROCEDURE — 25010000003 CEFAZOLIN IN DEXTROSE 2-4 GM/100ML-% SOLUTION: Performed by: ORTHOPAEDIC SURGERY

## 2021-09-23 PROCEDURE — 99204 OFFICE O/P NEW MOD 45 MIN: CPT | Performed by: ORTHOPAEDIC SURGERY

## 2021-09-23 PROCEDURE — 76942 ECHO GUIDE FOR BIOPSY: CPT | Performed by: ORTHOPAEDIC SURGERY

## 2021-09-23 PROCEDURE — C9290 INJ, BUPIVACAINE LIPOSOME: HCPCS | Performed by: ANESTHESIOLOGY

## 2021-09-23 PROCEDURE — 25010000002 MIDAZOLAM PER 1 MG: Performed by: ANESTHESIOLOGY

## 2021-09-23 PROCEDURE — 73060 X-RAY EXAM OF HUMERUS: CPT

## 2021-09-23 PROCEDURE — 93010 ELECTROCARDIOGRAM REPORT: CPT | Performed by: INTERNAL MEDICINE

## 2021-09-23 PROCEDURE — 25010000002 PROPOFOL 10 MG/ML EMULSION: Performed by: ANESTHESIOLOGY

## 2021-09-23 PROCEDURE — 93005 ELECTROCARDIOGRAM TRACING: CPT | Performed by: NURSE PRACTITIONER

## 2021-09-23 PROCEDURE — C1889 IMPLANT/INSERT DEVICE, NOC: HCPCS | Performed by: ORTHOPAEDIC SURGERY

## 2021-09-23 DEVICE — SCRW LK S/TAP STRDRV 3.5X34MM: Type: IMPLANTABLE DEVICE | Site: HUMERUS | Status: FUNCTIONAL

## 2021-09-23 DEVICE — SCRW LK S/TAP STRDRV 3.5X40MM: Type: IMPLANTABLE DEVICE | Site: HUMERUS | Status: FUNCTIONAL

## 2021-09-23 DEVICE — SCRW LK S/TAP STRDRV 3.5X38MM: Type: IMPLANTABLE DEVICE | Site: HUMERUS | Status: FUNCTIONAL

## 2021-09-23 DEVICE — SCRW LK S/TAP STRDRV 3.5X48MM: Type: IMPLANTABLE DEVICE | Site: HUMERUS | Status: FUNCTIONAL

## 2021-09-23 DEVICE — KNOTLESS TISSUE CONTROL DEVICE, UNDYED UNIDIRECTIONAL (ANTIBACTERIAL) SYNTHETIC ABSORBABLE DEVICE
Type: IMPLANTABLE DEVICE | Site: HUMERUS | Status: FUNCTIONAL
Brand: STRATAFIX

## 2021-09-23 DEVICE — SCRW LK S/TAP STRDRV 3.5X42MM: Type: IMPLANTABLE DEVICE | Site: HUMERUS | Status: FUNCTIONAL

## 2021-09-23 DEVICE — IMPLANTABLE DEVICE: Type: IMPLANTABLE DEVICE | Site: HUMERUS | Status: FUNCTIONAL

## 2021-09-23 DEVICE — SCRW LK S/TAP STRDRV 3.5X32MM: Type: IMPLANTABLE DEVICE | Site: HUMERUS | Status: FUNCTIONAL

## 2021-09-23 DEVICE — SCRW LK S/TAP STRDRV 3.5X26MM: Type: IMPLANTABLE DEVICE | Site: HUMERUS | Status: FUNCTIONAL

## 2021-09-23 DEVICE — SCRW CORT S/TAP 3.5X28MM: Type: IMPLANTABLE DEVICE | Site: HUMERUS | Status: FUNCTIONAL

## 2021-09-23 DEVICE — SCRW LK S/TAP STRDRV 3.5X30MM: Type: IMPLANTABLE DEVICE | Site: HUMERUS | Status: FUNCTIONAL

## 2021-09-23 RX ORDER — FENTANYL CITRATE 50 UG/ML
INJECTION, SOLUTION INTRAMUSCULAR; INTRAVENOUS
Status: COMPLETED | OUTPATIENT
Start: 2021-09-23 | End: 2021-09-23

## 2021-09-23 RX ORDER — HYDRALAZINE HYDROCHLORIDE 20 MG/ML
5 INJECTION INTRAMUSCULAR; INTRAVENOUS
Status: DISCONTINUED | OUTPATIENT
Start: 2021-09-23 | End: 2021-09-23 | Stop reason: HOSPADM

## 2021-09-23 RX ORDER — HYDROMORPHONE HYDROCHLORIDE 1 MG/ML
0.5 INJECTION, SOLUTION INTRAMUSCULAR; INTRAVENOUS; SUBCUTANEOUS
Status: DISCONTINUED | OUTPATIENT
Start: 2021-09-23 | End: 2021-09-23 | Stop reason: HOSPADM

## 2021-09-23 RX ORDER — LIDOCAINE HYDROCHLORIDE 10 MG/ML
0.5 INJECTION, SOLUTION EPIDURAL; INFILTRATION; INTRACAUDAL; PERINEURAL ONCE AS NEEDED
Status: DISCONTINUED | OUTPATIENT
Start: 2021-09-23 | End: 2021-09-23 | Stop reason: HOSPADM

## 2021-09-23 RX ORDER — ONDANSETRON 2 MG/ML
4 INJECTION INTRAMUSCULAR; INTRAVENOUS ONCE AS NEEDED
Status: COMPLETED | OUTPATIENT
Start: 2021-09-23 | End: 2021-09-23

## 2021-09-23 RX ORDER — FAMOTIDINE 10 MG/ML
20 INJECTION, SOLUTION INTRAVENOUS ONCE
Status: COMPLETED | OUTPATIENT
Start: 2021-09-23 | End: 2021-09-23

## 2021-09-23 RX ORDER — SODIUM CHLORIDE 0.9 % (FLUSH) 0.9 %
3-10 SYRINGE (ML) INJECTION AS NEEDED
Status: DISCONTINUED | OUTPATIENT
Start: 2021-09-23 | End: 2021-09-23 | Stop reason: HOSPADM

## 2021-09-23 RX ORDER — PROPOFOL 10 MG/ML
VIAL (ML) INTRAVENOUS AS NEEDED
Status: DISCONTINUED | OUTPATIENT
Start: 2021-09-23 | End: 2021-09-23 | Stop reason: SURG

## 2021-09-23 RX ORDER — PROMETHAZINE HYDROCHLORIDE 25 MG/1
25 TABLET ORAL ONCE AS NEEDED
Status: DISCONTINUED | OUTPATIENT
Start: 2021-09-23 | End: 2021-09-23 | Stop reason: HOSPADM

## 2021-09-23 RX ORDER — MIDAZOLAM HYDROCHLORIDE 1 MG/ML
0.5 INJECTION INTRAMUSCULAR; INTRAVENOUS
Status: DISCONTINUED | OUTPATIENT
Start: 2021-09-23 | End: 2021-09-23 | Stop reason: HOSPADM

## 2021-09-23 RX ORDER — DOCUSATE SODIUM 100 MG/1
100 CAPSULE, LIQUID FILLED ORAL 2 TIMES DAILY
Qty: 60 CAPSULE | Refills: 0 | Status: SHIPPED | OUTPATIENT
Start: 2021-09-23 | End: 2021-10-21

## 2021-09-23 RX ORDER — OXYCODONE AND ACETAMINOPHEN 10; 325 MG/1; MG/1
1 TABLET ORAL EVERY 4 HOURS PRN
Status: DISCONTINUED | OUTPATIENT
Start: 2021-09-23 | End: 2021-09-23 | Stop reason: HOSPADM

## 2021-09-23 RX ORDER — DEXAMETHASONE SODIUM PHOSPHATE 10 MG/ML
INJECTION INTRAMUSCULAR; INTRAVENOUS AS NEEDED
Status: DISCONTINUED | OUTPATIENT
Start: 2021-09-23 | End: 2021-09-23 | Stop reason: SURG

## 2021-09-23 RX ORDER — SODIUM CHLORIDE 0.9 % (FLUSH) 0.9 %
3 SYRINGE (ML) INJECTION EVERY 12 HOURS SCHEDULED
Status: DISCONTINUED | OUTPATIENT
Start: 2021-09-23 | End: 2021-09-23 | Stop reason: HOSPADM

## 2021-09-23 RX ORDER — FENTANYL CITRATE 50 UG/ML
50 INJECTION, SOLUTION INTRAMUSCULAR; INTRAVENOUS
Status: DISCONTINUED | OUTPATIENT
Start: 2021-09-23 | End: 2021-09-23 | Stop reason: HOSPADM

## 2021-09-23 RX ORDER — DIPHENHYDRAMINE HYDROCHLORIDE 50 MG/ML
12.5 INJECTION INTRAMUSCULAR; INTRAVENOUS
Status: DISCONTINUED | OUTPATIENT
Start: 2021-09-23 | End: 2021-09-23 | Stop reason: HOSPADM

## 2021-09-23 RX ORDER — HYDROCODONE BITARTRATE AND ACETAMINOPHEN 7.5; 325 MG/1; MG/1
1-2 TABLET ORAL EVERY 4 HOURS PRN
Qty: 42 TABLET | Refills: 0 | Status: SHIPPED | OUTPATIENT
Start: 2021-09-23 | End: 2021-10-21

## 2021-09-23 RX ORDER — CEFAZOLIN SODIUM 2 G/100ML
2 INJECTION, SOLUTION INTRAVENOUS ONCE
Status: COMPLETED | OUTPATIENT
Start: 2021-09-23 | End: 2021-09-23

## 2021-09-23 RX ORDER — NALOXONE HCL 0.4 MG/ML
0.2 VIAL (ML) INJECTION AS NEEDED
Status: DISCONTINUED | OUTPATIENT
Start: 2021-09-23 | End: 2021-09-23 | Stop reason: HOSPADM

## 2021-09-23 RX ORDER — PROMETHAZINE HYDROCHLORIDE 25 MG/1
25 SUPPOSITORY RECTAL ONCE AS NEEDED
Status: DISCONTINUED | OUTPATIENT
Start: 2021-09-23 | End: 2021-09-23 | Stop reason: HOSPADM

## 2021-09-23 RX ORDER — LIDOCAINE HYDROCHLORIDE 20 MG/ML
INJECTION, SOLUTION INFILTRATION; PERINEURAL AS NEEDED
Status: DISCONTINUED | OUTPATIENT
Start: 2021-09-23 | End: 2021-09-23 | Stop reason: SURG

## 2021-09-23 RX ORDER — ONDANSETRON 2 MG/ML
INJECTION INTRAMUSCULAR; INTRAVENOUS AS NEEDED
Status: DISCONTINUED | OUTPATIENT
Start: 2021-09-23 | End: 2021-09-23 | Stop reason: SURG

## 2021-09-23 RX ORDER — ONDANSETRON 4 MG/1
4 TABLET, FILM COATED ORAL EVERY 8 HOURS PRN
Qty: 30 TABLET | Refills: 0 | Status: SHIPPED | OUTPATIENT
Start: 2021-09-23 | End: 2021-10-21

## 2021-09-23 RX ORDER — MAGNESIUM HYDROXIDE 1200 MG/15ML
LIQUID ORAL AS NEEDED
Status: DISCONTINUED | OUTPATIENT
Start: 2021-09-23 | End: 2021-09-23 | Stop reason: HOSPADM

## 2021-09-23 RX ORDER — EPHEDRINE SULFATE 50 MG/ML
5 INJECTION, SOLUTION INTRAVENOUS ONCE AS NEEDED
Status: DISCONTINUED | OUTPATIENT
Start: 2021-09-23 | End: 2021-09-23 | Stop reason: HOSPADM

## 2021-09-23 RX ORDER — HYDROCODONE BITARTRATE AND ACETAMINOPHEN 7.5; 325 MG/1; MG/1
1 TABLET ORAL ONCE AS NEEDED
Status: COMPLETED | OUTPATIENT
Start: 2021-09-23 | End: 2021-09-23

## 2021-09-23 RX ORDER — BUPIVACAINE HYDROCHLORIDE 2.5 MG/ML
INJECTION, SOLUTION EPIDURAL; INFILTRATION; INTRACAUDAL
Status: COMPLETED | OUTPATIENT
Start: 2021-09-23 | End: 2021-09-23

## 2021-09-23 RX ORDER — IBUPROFEN 600 MG/1
600 TABLET ORAL ONCE AS NEEDED
Status: DISCONTINUED | OUTPATIENT
Start: 2021-09-23 | End: 2021-09-23 | Stop reason: HOSPADM

## 2021-09-23 RX ORDER — DIPHENHYDRAMINE HCL 25 MG
25 CAPSULE ORAL
Status: DISCONTINUED | OUTPATIENT
Start: 2021-09-23 | End: 2021-09-23 | Stop reason: HOSPADM

## 2021-09-23 RX ORDER — FLUMAZENIL 0.1 MG/ML
0.2 INJECTION INTRAVENOUS AS NEEDED
Status: DISCONTINUED | OUTPATIENT
Start: 2021-09-23 | End: 2021-09-23 | Stop reason: HOSPADM

## 2021-09-23 RX ORDER — LABETALOL HYDROCHLORIDE 5 MG/ML
5 INJECTION, SOLUTION INTRAVENOUS
Status: DISCONTINUED | OUTPATIENT
Start: 2021-09-23 | End: 2021-09-23 | Stop reason: HOSPADM

## 2021-09-23 RX ORDER — ROCURONIUM BROMIDE 10 MG/ML
INJECTION, SOLUTION INTRAVENOUS AS NEEDED
Status: DISCONTINUED | OUTPATIENT
Start: 2021-09-23 | End: 2021-09-23 | Stop reason: SURG

## 2021-09-23 RX ORDER — SODIUM CHLORIDE, SODIUM LACTATE, POTASSIUM CHLORIDE, CALCIUM CHLORIDE 600; 310; 30; 20 MG/100ML; MG/100ML; MG/100ML; MG/100ML
9 INJECTION, SOLUTION INTRAVENOUS CONTINUOUS
Status: DISCONTINUED | OUTPATIENT
Start: 2021-09-23 | End: 2021-09-23 | Stop reason: HOSPADM

## 2021-09-23 RX ORDER — MIDAZOLAM HYDROCHLORIDE 1 MG/ML
INJECTION INTRAMUSCULAR; INTRAVENOUS
Status: COMPLETED | OUTPATIENT
Start: 2021-09-23 | End: 2021-09-23

## 2021-09-23 RX ADMIN — MIDAZOLAM 1 MG: 1 INJECTION INTRAMUSCULAR; INTRAVENOUS at 14:02

## 2021-09-23 RX ADMIN — PHENYLEPHRINE HYDROCHLORIDE 100 MCG: 10 INJECTION INTRAVENOUS at 15:50

## 2021-09-23 RX ADMIN — LIDOCAINE HYDROCHLORIDE 100 MG: 20 INJECTION, SOLUTION INFILTRATION; PERINEURAL at 15:04

## 2021-09-23 RX ADMIN — PHENYLEPHRINE HYDROCHLORIDE 100 MCG: 10 INJECTION INTRAVENOUS at 16:13

## 2021-09-23 RX ADMIN — PHENYLEPHRINE HYDROCHLORIDE 100 MCG: 10 INJECTION INTRAVENOUS at 15:23

## 2021-09-23 RX ADMIN — PROPOFOL 150 MG: 10 INJECTION, EMULSION INTRAVENOUS at 15:04

## 2021-09-23 RX ADMIN — FENTANYL CITRATE 50 MCG: 50 INJECTION INTRAMUSCULAR; INTRAVENOUS at 14:02

## 2021-09-23 RX ADMIN — PHENYLEPHRINE HYDROCHLORIDE 100 MCG: 10 INJECTION INTRAVENOUS at 15:36

## 2021-09-23 RX ADMIN — ONDANSETRON 4 MG: 2 INJECTION INTRAMUSCULAR; INTRAVENOUS at 16:51

## 2021-09-23 RX ADMIN — PHENYLEPHRINE HYDROCHLORIDE 100 MCG: 10 INJECTION INTRAVENOUS at 16:05

## 2021-09-23 RX ADMIN — CEFAZOLIN SODIUM 2 G: 2 INJECTION, SOLUTION INTRAVENOUS at 14:52

## 2021-09-23 RX ADMIN — FAMOTIDINE 20 MG: 10 INJECTION INTRAVENOUS at 14:00

## 2021-09-23 RX ADMIN — SUGAMMADEX 200 MG: 100 INJECTION, SOLUTION INTRAVENOUS at 16:51

## 2021-09-23 RX ADMIN — ONDANSETRON 4 MG: 2 INJECTION INTRAMUSCULAR; INTRAVENOUS at 18:33

## 2021-09-23 RX ADMIN — SODIUM CHLORIDE, POTASSIUM CHLORIDE, SODIUM LACTATE AND CALCIUM CHLORIDE 9 ML/HR: 600; 310; 30; 20 INJECTION, SOLUTION INTRAVENOUS at 13:54

## 2021-09-23 RX ADMIN — ROCURONIUM BROMIDE 50 MG: 50 INJECTION INTRAVENOUS at 15:04

## 2021-09-23 RX ADMIN — BUPIVACAINE HYDROCHLORIDE 20 ML: 2.5 INJECTION, SOLUTION EPIDURAL; INFILTRATION; INTRACAUDAL; PERINEURAL at 13:55

## 2021-09-23 RX ADMIN — BUPIVACAINE 10 ML: 13.3 INJECTION, SUSPENSION, LIPOSOMAL INFILTRATION at 13:55

## 2021-09-23 RX ADMIN — HYDROCODONE BITARTRATE AND ACETAMINOPHEN 1 TABLET: 7.5; 325 TABLET ORAL at 18:34

## 2021-09-23 RX ADMIN — DEXAMETHASONE SODIUM PHOSPHATE 8 MG: 10 INJECTION INTRAMUSCULAR; INTRAVENOUS at 16:51

## 2021-09-23 NOTE — ANESTHESIA PROCEDURE NOTES
Peripheral Block    Pre-sedation assessment completed: 9/23/2021 1:55 PM    Patient reassessed immediately prior to procedure    Patient location during procedure: holding area  Start time: 9/23/2021 1:55 PM  Stop time: 9/23/2021 2:05 PM  Reason for block: at surgeon's request and post-op pain management  Performed by  Anesthesiologist: Asad Morrison MD  Preanesthetic Checklist  Completed: patient identified, IV checked, site marked, risks and benefits discussed, surgical consent, monitors and equipment checked, pre-op evaluation and timeout performed  Prep:  Pt Position: sitting  Sterile barriers:cap, gloves, gown, mask and sterile barriers  Prep: ChloraPrep  Patient monitoring: blood pressure monitoring, continuous pulse oximetry and EKG  Procedure  Sedation:yes  Performed under: local infiltration  Guidance:ultrasound guided  ULTRASOUND INTERPRETATION.  Using ultrasound guidance a 21 G gauge needle was placed in close proximity to the brachial plexus nerve, at which point, under ultrasound guidance anesthetic was injected in the area of the nerve and spread of the anesthesia was seen on ultrasound in close proximity thereto.  There were no abnormalities seen on ultrasound; a digital image was taken; and the patient tolerated the procedure with no complications. Images:still images obtained    Laterality:left  Block Type:interscalene  Injection Technique:single-shot  Needle Type:echogenic  Needle Gauge:21 G      Medications Used: bupivacaine liposome (EXPAREL) 1.3 % injection, 10 mL  bupivacaine PF (MARCAINE) 0.25 % injection, 20 mL      Medications  Comment:Ultrasound Interpretation: Using ultrasound guidance, the needle was placed in close proximity to the target nerve and anesthetic was injected in the area of the target nerve and/or bundles, and spread of the anesthetic was seen on ultrasound in close proximity thereto.  There were no abnormalities seen on ultrasound; a digital / physical image was  taken; and the patient tolerated the procedure with no complications.   Block placed for postoperative pain control per surgeon request.    Post Assessment  Injection Assessment: negative aspiration for heme, no paresthesia on injection and incremental injection  Patient Tolerance:comfortable throughout block  Complications:no

## 2021-09-23 NOTE — BRIEF OP NOTE
HUMERUS PROXIMAL OPEN REDUCTION INTERNAL FIXATION  Progress Note    Mela Muse  9/23/2021    Pre-op Diagnosis:   Other closed displaced fracture of proximal end of left humerus, initial encounter [S42.292A]       Post-Op Diagnosis Codes:     * Other closed displaced fracture of proximal end of left humerus, initial encounter [S42.292A]    Procedure/CPT® Codes:        Procedure(s):  Open Reduction and Internal Fixation Proximal Humerus    Surgeon(s):  Sherman Panchal MD    Anesthesia: General with Block    Staff:   Circulator: Edith Arora RN; Henry Telles RN  Radiology Technologist: Saima Weiss  Scrub Person: Rodrigue Hernandez  Vendor Representative: Bebeto Barron  Assistant: Nenita Bonner APRN  Assistant: Nenita Bonner APRN      Estimated Blood Loss: 100ml    Urine Voided: * No values recorded between 9/23/2021  2:57 PM and 9/23/2021  4:40 PM *    Specimens:                None          Drains: * No LDAs found *    Findings: see dictation    Complications: none    Assistant: Nenita Bonner APRN  was responsible for performing the following activities: Retraction and their skilled assistance was necessary for the success of this case.    Sherman Panchal MD     Date: 9/23/2021  Time: 16:40 EDT

## 2021-09-23 NOTE — DISCHARGE INSTRUCTIONS
Pendulum Exercises for Post Op Shoulder Surgery      1. Lean over with your good arm supported on a table or chair.  2. Relax the injured arm and allow it to hang straight down at your side.  3. Slowly begin to swing the relaxed arm back and forth.  Then swing it side to side.  Next, move it in a [place]. Now,  reverse the direction.        Generally, you should spend about 5 minutes doing this exercise.  It should be done 2-3 times daily or as directed by your physician.          What to expect after a Nerve Block    Nerve blocks administered to block pain affect many types of nerves, including those nerves that control movement, pain, and normal sensation. Following a nerve block, you may notice some bruising at the site where the block was given. You may experience sensations such as: numbness of the affected area or limb, tingling, heaviness (that is the limb feels heavy to you), weakness or inability to move the affected arm or leg, or a feeling as if your arm or leg has “fallen asleep.”     A nerve block can last from 2 to 36 hours depending on the medications used.  Usually the weakness wears off first followed by the tingling and heaviness. As the block wears off, you may begin to notice pain; however, this sequence of events may occur in any order. Typically, you will be able to move your limb before you will feel it. Once a nerve block begins to wear off, the effects are usually completely gone within 60 minutes.  If you experience continued side effects that you believe are block related for longer than 48 hours, please call your healthcare provider. Please see block-specific instructions below.    Instructions for any block involving the shoulder or arm  • If you have had any kind of shoulder/arm block, you will go home with your arm in a sling. Wear the sling until the block has completely worn off. You may be required to wear it for a longer period of time per your surgeon’s recommendations.  • If you  have had a shoulder/arm block, it is a good idea to sleep on a recliner with pillows under your arm.    You may experience symptoms such as:  Shortness of breath  Hoarseness   Blurry vision  Unequal pupils  Drooping of your face on the same side as the block was performed    These are side effects associated with this kind of block and should go away within 12 hours.    Note: If you have severe or prolonged shortness of breath, please seek medical assistance as soon as possible.     Protection of a “blocked” arm or leg (limb)  • After a nerve block, you cannot feel pain, pressure, or extremes of temperature in the affected limb. And because of this, your blocked limb is at more risk for injury. For example, it is possible to burn your limb on an extremely hot surface without feeling it.     • When resting, it is important to reposition your limb periodically to avoid prolonged pressure on it. This may require the use of pillows and padding.    • While sleeping, you should avoid rolling onto the affected limb or putting too much pressure on it.     • If you have a cast or tight dressing, check the color of your fingers or toes of the affected limb. Call your surgeon if they look discolored (that is, dusky, dark colored).    • Use caution in cold weather. Cover your limb appropriately to protect it from the cold.      Pain Management:    Your surgeon will give you a prescription for pain medication. Begin taking this before the nerve block wears off. Bear in mind that sometimes the block can wear off in the middle of the night.

## 2021-09-23 NOTE — ANESTHESIA PROCEDURE NOTES
Airway  Urgency: elective    Date/Time: 9/23/2021 3:05 PM  End Time:9/23/2021 3:06 PM  Airway not difficult    General Information and Staff    Patient location during procedure: OR  Anesthesiologist: Mirza Driscoll MD    Indications and Patient Condition  Indications for airway management: airway protection    Preoxygenated: yes  MILS maintained throughout  Mask difficulty assessment: 1 - vent by mask    Final Airway Details  Final airway type: endotracheal airway      Successful airway: ETT  Cuffed: yes   Successful intubation technique: direct laryngoscopy  Facilitating devices/methods: cricoid pressure and Bougie  Endotracheal tube insertion site: oral  Blade: Akil  Blade size: 3  ETT size (mm): 7.0  Cormack-Lehane Classification: grade IIa - partial view of glottis  Placement verified by: chest auscultation and capnometry   Inital cuff pressure (cm H2O): 5  Cuff volume (mL): 5  Measured from: gums  ETT/EBT to gums (cm): 22  Number of attempts at approach: 2  Assessment: lips, teeth, and gum same as pre-op and atraumatic intubation

## 2021-09-23 NOTE — PERIOPERATIVE NURSING NOTE
notified of patient currently having cough. Patient tested positive for Covid on 9/13/21. Per infection disease patient is still considered Covid history. Proceed with surgery as normal.

## 2021-09-23 NOTE — ANESTHESIA PREPROCEDURE EVALUATION
Anesthesia Evaluation     Patient summary reviewed and Nursing notes reviewed                Airway   Mallampati: II  TM distance: >3 FB  Neck ROM: full  Dental      Pulmonary    (+) a smoker Former,   Cardiovascular     ECG reviewed  Rhythm: regular  Rate: normal    (+) hypertension, CAD, PVD,  carotid artery disease      Neuro/Psych  (+) headaches, dizziness/light headedness, psychiatric history Anxiety and Depression,     GI/Hepatic/Renal/Endo - negative ROS     Musculoskeletal     Abdominal    Substance History - negative use     OB/GYN negative ob/gyn ROS         Other   arthritis,    history of cancer                    Anesthesia Plan    ASA 3     general with block   (Left ISB with Exparel PSR for POPC  I have reviewed the patient's history with the patient and the chart, including all pertinent laboratory results and imaging. I have explained the risks of anesthesia including but not limited to dental damage, corneal abrasion, nerve injury, MI, stroke, and death. Questions asked and answered. Anesthetic plan discussed with patient and team as indicated. Patient expressed understanding of the above.  )  intravenous induction     Anesthetic plan, all risks, benefits, and alternatives have been provided, discussed and informed consent has been obtained with: patient.

## 2021-09-23 NOTE — H&P
History & Physical       Patient: Mela Muse    YOB: 1933    Medical Record Number: [id number]    Attending Physician: Shermna Panchal MD    Chief Complaints: Left shoulder injury    History of Present Illness: 88 y.o. female presents for evaluation of the left shoulder.  The injury was sustained approximately 10 days ago and a fall at home.  Current pain is described as moderate, constant, and aching.  She reports good use and function of the arm prior to this incident.  Denies any other complaints or issues.  She was diagnosed with Covid at the time of her ER visit for the proximal humerus fracture.  She has been cleared by infectious disease as of today.    Allergies:   Allergies   Allergen Reactions   • Codeine Mental Status Change       Home Medications:      Current Facility-Administered Medications:   •  bupivacaine liposome (EXPAREL) 1.3 % injection 266 mg, 20 mL, Injection, Once, Sherman Panchal MD  •  ceFAZolin in dextrose (ANCEF) IVPB solution 2 g, 2 g, Intravenous, Once, Sherman Panchal MD  •  famotidine (PEPCID) injection 20 mg, 20 mg, Intravenous, Once, Asad Morrison MD  •  fentaNYL citrate (PF) (SUBLIMAZE) injection 50 mcg, 50 mcg, Intravenous, Q10 Min PRN, Asad Morrison MD  •  lactated ringers infusion, 9 mL/hr, Intravenous, Continuous, Asad Morrison MD, Last Rate: 9 mL/hr at 09/23/21 1354, 9 mL/hr at 09/23/21 1354  •  lidocaine PF 1% (XYLOCAINE) injection 0.5 mL, 0.5 mL, Injection, Once PRN, Asad Morrison MD  •  midazolam (VERSED) injection 0.5 mg, 0.5 mg, Intravenous, Q10 Min PRN, Asad Morrison MD  •  sodium chloride 0.9 % flush 3 mL, 3 mL, Intravenous, Q12H, Asad Morrison MD  •  sodium chloride 0.9 % flush 3-10 mL, 3-10 mL, Intravenous, PRN, Asad Morrison MD    Past Medical History:   Diagnosis Date   • Anxiety    • Arthritis    • Cancer (CMS/HCC) 01/2020    left breast   • Cancer of skin of leg,  "right    • Coronary artery disease     \"BLOCKAGE IN MY LEGS\" stents bilateral legs per pt   • COVID-19 virus infection 2021   • Hemorrhoids    • History of basal cell cancer    • History of headaches    • Humerus fracture     LEFT    • Hypertension    • Hypothyroidism    • Melanoma (CMS/HCC) 2017    LEFT LEG   • PAD (peripheral artery disease) (CMS/HCC)    • Skin cancer 2015    left nose   • Skin cancer     right leg   • Vertigo    • Vestibular migraine           Past Surgical History:   Procedure Laterality Date   • BREAST LUMPECTOMY WITH SENTINEL NODE BIOPSY Left 2020    Procedure: BREAST LUMPECTOMY WITH NEEDLE LOCALIZATION;  Surgeon: Kush Plasencia MD;  Location: [facility] OR;  Service: General;  Laterality: Left;   • BREAST SURGERY Left     BIOPSY   • COLONOSCOPY      ALL OK   • COSMETIC SURGERY      AFTER SKIN EXCISION FROM NOSE   • ENDOSCOPY     • EYE SURGERY Bilateral     CATARACTS   • LEG SURGERY Left     MELANOMA EXCISION-WITH SKIN GRAFT   • SKIN BIOPSY     • SKIN CANCER EXCISION Bilateral     NOSE, UPPER LIP   • SKIN GRAFT Bilateral    • VASCULAR SURGERY Bilateral 2018    STENTS IN BOTH UPPER LEGS          Social History     Occupational History     Employer: RETIRED   Tobacco Use   • Smoking status: Former Smoker     Quit date: 1970     Years since quittin.7   • Smokeless tobacco: Never Used   Vaping Use   • Vaping Use: Never used   Substance and Sexual Activity   • Alcohol use: Never   • Drug use: Never   • Sexual activity: Defer      Social History     Social History Narrative   • Not on file          Family History   Problem Relation Age of Onset   • Heart failure Father    • Skin cancer Father    • Heart disease Father    • Breast cancer Sister 46   • Heart disease Sister    • Esophageal cancer Brother    • Heart disease Brother    • Leukemia Daughter    • Breast cancer Maternal Grandmother         70s   • Colon cancer Maternal Grandmother    • [place]" "Hyperthermia Neg Hx        Review of Systems:      Constitutional: Denies fever, shaking or chills   Eyes: Denies change in visual acuity   HEENT: Denies nasal congestion or sore throat   Respiratory: Denies cough or shortness of breath   Cardiovascular: Denies chest pain or edema  Endocrine: Denies tremors, palpitations, intolerance of heat or cold, polyuria, polydipsia.  GI: Denies abdominal pain, nausea, vomiting, bloody stools or diarrhea  : Denies frequency, urgency, incontinence, retention, or nocturia.  Musculoskeletal: Denies numbness tingling or loss of motor function except as above  Integument: Denies rash, lesion or ulceration   Neurologic: Denies headache or focal weakness, deficits  Heme: Denies epistaxis, spontaneous or excessive bleeding, epistaxis, hematuria, melena, fatigue, enlarged or tender lymph nodes.      All other pertinent positives and negatives as noted above in HPI.    Physical Exam: 88 y.o. female    Vitals:    09/23/21 1237 09/23/21 1330 09/23/21 1347 09/23/21 1355   BP: 165/70 168/80     BP Location: Right arm Right arm     Patient Position: Lying Lying     Pulse: 65 63 64 73   Resp: 20 18 16 16   Temp: 97.8 °F (36.6 °C)      TempSrc: Oral      SpO2: 100% 99% 100% 100%   Weight: 60.2 kg (132 lb 11.5 oz)      Height: 167.6 cm (66\")          General:  Patient is awake and alert.  Appears in no acute distress or discomfort.    Psych:  Affect and demeanor are appropriate.    Eyes:  Conjunctiva and sclera appear grossly normal.  Eyes track well and EOM seem to be intact.    Dentition:  No gross abnormalities noted.    Ears:  No gross abnormalities.  Hearing adequate for the exam.    Cardiovascular:  Regular rate and rhythm.    Lungs:  Good chest expansion.  Breathing unlabored.    Lymph:  No palpable masses or adenopathy in the affected extremity    Left upper extremity:  Sling was in place and removed.  There is anterior edema.  Skin appears benign.  No gross malalignment, lacerations " or abrasions.  Focal tenderness noted over the proximal humerus and upper arm.  No tenderness down along the lower arm, elbow, forearm, wrist or hand.  No palpable masses or adenopathy.  Compartments soft.  Painful, limited ROM of the shoulder.  Could not assess stability due to discomfort with shoulder motion.  Good strength in the wrist with flexion and extension as well as , pinch, finger and thumb abduction.  Intact sensation. Palpable radial pulse.  Brisk capillary refill.    Diagnostic Tests:  Lab Results   Component Value Date    GLUCOSE 99 09/14/2021    CALCIUM 8.6 09/14/2021     (L) 09/14/2021    K 4.4 09/14/2021    CO2 26.5 09/14/2021    CL 99 09/14/2021    BUN 26 (H) 09/14/2021    CREATININE 1.02 (H) 09/14/2021    EGFRIFNONA 51 (L) 09/14/2021    BCR 25.5 (H) 09/14/2021    ANIONGAP 9.5 09/14/2021     Lab Results   Component Value Date    WBC 5.70 09/14/2021    HGB 12.1 09/14/2021    HCT 36.6 09/14/2021    MCV 90.6 09/14/2021     09/14/2021     Lab Results   Component Value Date    INR 1.0 09/17/2018    PROTIME 11.4 09/17/2018       Imaging:  Outside AP and lateral views of the left shoulder are reviewed along with the associated report.  There is a displaced fracture of the proximal humerus.  This appears to be a 2 part fracture just below the surgical neck    Assessment:  Displaced left proximal humerus fracture    Plan:  We had a thorough discussion regarding the patient's options and the risks of non-surgical management versus surgery.  I explained that surgical risks include infection, hematoma, hardware related complications including failure of fixation, cutout, nonunion, malunion, persistent pain and/or loss of motion, iatrogenic nerve and/or blood vessel injury resulting in permanent weakness, numbness or dysfunction, RSD, DVT, PE, positioning related neuropraxia, and anesthesia related complications resulting in death.      Given the displacement and her good premorbid function, I  consider that this injury warrants surgical intervention.  She voiced understanding of the risks, benefits, and alternative forms of treatment that were discussed and consents to proceed with open reduction internal fixation.      Date: 9/23/2021    Sherman Panchal MD

## 2021-09-23 NOTE — NURSING NOTE
ALLISON Allison  transferred patient to holding room from [facility] patient  is cleared by Dr. Panchal and Infection Control to have  surgery today.  ALLISON Allison [facility] patient  does not need to be in isolation (positive Covid test on 9/13 and  9/21/21 ) per  Infection control- LIBERTAD Villanueva.

## 2021-09-23 NOTE — OP NOTE
Orthopaedic Operative Note    Facility: [facility]    Patient: Mela Muse    Medical Record Number: [id number]    YOB: 1933    Dictating Surgeon: Sherman Panchal M.D.*    Primary Care Physician: Yuniel Bonilla MD    Date of Operation: 9/23/2021    Pre-Operative Diagnosis:  Left proximal humerus fracture    Post-Operative Diagnosis:  Left proximal humerus fracture    Procedure Performed:  Open reduction, internal fixation of displaced proximal humerus fracture    Surgeon: Sherman Panchal MD     Assistant: DOMINGUEZ Briscoe whose assistance was critical for help with patient positioning, suctioning and irrigation, retraction, manipulation of the extremity for insertion of the implants, wound closure and application of the bandages.  Her assistance was critical to the success of this case.    Anesthesia: Regional followed by Gen.    Complications: None.     Estimated Blood Loss: Less than 100 mL.     Implants: Synthes 5 hole proximal humerus locking plate with multiple locking and nonlocking screws    Specimens: * No orders in the log *    Brief Operative Indication:  The risks, benefits, and alternatives to surgical fixation of the fracture were discussed in detail.  We talked about the surgery itself, how it is done, and the rehabilitation and recovery.  Specifically, with regards to the risks, we talked about the risk of infection, wound healing problems, nonunion, malunion, persistent pain or deformity which could necessitate further intervention down the road including the possible need for revision to an arthroplasty.  We talked about injury to nearby neurovascular structures, particularly the axillary or musculocutaneous nerves, which could result in permanent weakness, numbness, or dysfunction and potentially necessitate further surgery as well.  Last,we did also talk about the risk of RSD, PE, DVT, anesthesia related complications and medical complications as  result of surgery potentially resulting in death.  She consented to proceed.    Description of the procedure in detail:  The patient and operative site were identified in the preoperative holding area.  The surgical site was marked.  Adequate regional anesthesia was administered.  The patient was then taken to the operating room.  The patient was placed on the operating table where adequate general anesthesia was administered.  The patient was then repositioned into the modified beachchair position with the head and neck in neutral alignment.  All bony prominences were carefully padded and protected.    The left upper extremity was then prepped and draped in the standard, sterile fashion.  The extremity was cleaned with an alcohol solution.  A Hibiclens scrub was performed and then the extremity was prepped with 2 ChloraPrep preps.  I allowed this to dry for 3 minutes before the draping procedure was carried out.    A timeout was taken and preoperative antibiotics administered.  Transexamic acid was administered at this time as well.  Following this, I began by fashioning an approximately 6 cm incision over the anterior aspect of the shoulder.  This was carried down through the skin and subcutaneous tissues.  Full-thickness medial and lateral skin flaps were developed.  The interval between the deltoid and pectoralis was carefully identified and developed.  The underlying cephalic vein was dissected out and retracted laterally.  This structure was kept protected throughout the case.    A large fracture hematoma was encountered.  This was removed and debrided.  The fracture was essentially a 2 part fracture below the surgical neck in the upper shaft of the humerus.  There were 2 main fragments which I could interdigitate to get a direct read for the reduction.  She did have anterior comminution of the more proximal segment but these fragments were minimally displaced and functionally this fracture behaved as a 2 part  fracture.  The more distal shaft was medialized and significantly displaced.  I had to very carefully use finger dissection posteriorly and then wrap around behind the humerus to then pull this forward.  Once I got this reduced, I could directly interdigitate the main fragments.    Once I had the main fragments sufficiently well reduced, an appropriate length plate was selected for use and then pinned to the lateral aspect of the tuberosity, just posterior to the biceps groove.  This was a longer plate than typical due to the more distal nature of her fracture relative to the typical 2 part fracture.  I made sure that the plate was in good position and then checked this fluoroscopically.  The plate was then secured with a single nonlocking screw which was drilled, measured and placed through the oblong hole on the shaft.  This screw got good purchase and seemed to hold the plate in good position.    Next, I directed my attention to the proximal fixation.  Multiple proximal locking screws were placed under direct fluoroscopic guidance.  A receding pin sign was performed on each screw as it was placed to make sure that there was no intra-articular penetration.  During drilling of the screws I sounded with the drill and took care not to penetrate the articular surface.  I confirmed this with a depth gauge before placing each screw as well.  All the locking screws locked into the plate well.  Once I completed the proximal fixation, several additional locking screws were placed on the shaft to secure the construct.  Final images were taken and saved.  It did appear that had a good reduction of the fracture and that the hardware was well-positioned.      I could fully elevate, abduct and rotate her shoulder without any mechanical phenomenon or limitation.  The shoulder demonstrated excellent motion and absolutely no instability.  At this point, I irrigated the wound with 500 cc of a Betadine-containing saline solution.  I  then irrigated with 3 L of sterile saline via pulsatile lavage.  I made sure that we had good hemostasis.  The wound was sequentially closed in a layered fashion.  Vicryl was used to repair the subcutaneous tissues.  A running subcuticular Monocryl stitch was used to close the skin followed by Steri-Strips.  Sterile dressings were applied.  The drapes were withdrawn.  The arm was placed in a sling.  Ms. Muse was awakened and taken to the recovery room in good condition.    Sherman Panchal MD  09/23/21

## 2021-09-24 ENCOUNTER — TELEPHONE (OUTPATIENT)
Dept: ORTHOPEDIC SURGERY | Facility: CLINIC | Age: 86
End: 2021-09-24

## 2021-09-24 NOTE — ANESTHESIA POSTPROCEDURE EVALUATION
"Patient: Mela Muse    Procedure Summary     Date: 09/23/21 Room / Location: [facility] OR [address] [place] [address] MAIN OR    Anesthesia Start: 1500 Anesthesia Stop: 1710    Procedure: Open Reduction and Internal Fixation Proximal Humerus (Left Arm Upper) Diagnosis:       Other closed displaced fracture of proximal end of left humerus, initial encounter      (Other closed displaced fracture of proximal end of left humerus, initial encounter [S42.292A])    Surgeons: Sherman Panchal MD Provider: Mirza Driscoll MD    Anesthesia Type: general with block ASA Status: 3          Anesthesia Type: general with block    Vitals  Vitals Value Taken Time   /60 09/23/21 1851   Temp 36.6 °C (97.9 °F) 09/23/21 1705   Pulse 63 09/23/21 1900   Resp 16 09/23/21 1900   SpO2 97 % 09/23/21 1900   Vitals shown include unvalidated device data.        Post Anesthesia Care and Evaluation    Patient location during evaluation: PACU  Patient participation: complete - patient participated  Level of consciousness: awake  Pain management: adequate  Airway patency: patent  Anesthetic complications: No anesthetic complications    Cardiovascular status: acceptable  Respiratory status: acceptable  Hydration status: acceptable    Comments: BP 95/52 (BP Location: Right arm, Patient Position: Lying)   Pulse 66   Temp 36.6 °C (97.9 °F) (Oral)   Resp 16   Ht 167.6 cm (66\")   Wt 60.2 kg (132 lb 11.5 oz)   SpO2 95%   BMI 21.42 kg/m²         "

## 2021-09-24 NOTE — TELEPHONE ENCOUNTER
Postop follow-up call.  I spoke to Ms. Muse.  Reports she is doing very well.  No issues or concerns.  She tells me the nerve block is still working.  I instructed her to postpone pendulum exercises until she has full sensation return to her arm.  We discussed other postop care instructions.  She verbalized understanding and appreciated the call.

## 2021-10-06 ENCOUNTER — OFFICE VISIT (OUTPATIENT)
Dept: ORTHOPEDIC SURGERY | Facility: CLINIC | Age: 86
End: 2021-10-06

## 2021-10-06 VITALS — HEIGHT: 66 IN | WEIGHT: 132 LBS | TEMPERATURE: 98.6 F | BODY MASS INDEX: 21.21 KG/M2

## 2021-10-06 DIAGNOSIS — Z09 SURGERY FOLLOW-UP: Primary | ICD-10-CM

## 2021-10-06 PROCEDURE — 73030 X-RAY EXAM OF SHOULDER: CPT | Performed by: NURSE PRACTITIONER

## 2021-10-06 PROCEDURE — 99024 POSTOP FOLLOW-UP VISIT: CPT | Performed by: NURSE PRACTITIONER

## 2021-10-06 NOTE — PROGRESS NOTES
Mela Muse     : 1933     MRN: [id number]     DATE: 10/6/2021    CC:  2 weeks s/p ORIF left proximal humerus fracture    HPI: Patient returns to clinic today stating pain is improved.  Denies any new concerns or issues.    Vitals:    10/06/21 1309   Temp: 98.6 °F (37 °C)       Exam:  Incision is well-approximated.  No erythema or drainage.  Contour of shoulder appears normal.  Skin intact and benign.  Arm and forearm soft.  Shoulder moves fluidly with pendulum exercises.  Good motor and sensory function in the hand and wrist.  Palpable radial pulse with good cap refill.      Imaginv xrays including AP and scapular Y views of the shoulder are ordered and reviewed by me to evaluate alignment and for comparison purposes.  No new or concerning findings noted.  Fracture appears well-aligned.  Hardware appears in good position.    Impression:   2 weeks s/p ORIF left proximal humerus fracture    Plan:    1.  Discontinue use of sling.  2.  Begin working on progressive ROM.  3.  F/u in 4 weeks with repeat 3v xrays.  4.  Counseled the patient about appropriate activity modifications and restrictions.    Nenita Bonner, DOMINGUEZ    10/06/2021    Answers for HPI/ROS submitted by the patient on 10/5/2021  Please describe your symptoms.: Post Op Visit-Displaced Left Humorus repaired 2021 By Dr. Sherman Panchal  Have you had these symptoms before?: No  How long have you been having these symptoms?: Greater than 2 weeks  Please list any medications you are currently taking for this condition.: Not taking any pain medication for the break.  Please describe any probable cause for these symptoms. : Fall  What is the primary reason for your visit?: Other

## 2021-10-12 ENCOUNTER — CLINICAL SUPPORT (OUTPATIENT)
Dept: FAMILY MEDICINE CLINIC | Facility: CLINIC | Age: 86
End: 2021-10-12

## 2021-10-12 DIAGNOSIS — Z23 NEED FOR INFLUENZA VACCINATION: Primary | ICD-10-CM

## 2021-10-12 PROCEDURE — G0008 ADMIN INFLUENZA VIRUS VAC: HCPCS | Performed by: FAMILY MEDICINE

## 2021-10-12 PROCEDURE — 90662 IIV NO PRSV INCREASED AG IM: CPT | Performed by: FAMILY MEDICINE

## 2021-10-21 ENCOUNTER — OFFICE VISIT (OUTPATIENT)
Dept: FAMILY MEDICINE CLINIC | Facility: CLINIC | Age: 86
End: 2021-10-21

## 2021-10-21 VITALS
WEIGHT: 131 LBS | HEIGHT: 66 IN | HEART RATE: 66 BPM | BODY MASS INDEX: 21.05 KG/M2 | SYSTOLIC BLOOD PRESSURE: 122 MMHG | TEMPERATURE: 98.4 F | DIASTOLIC BLOOD PRESSURE: 56 MMHG | OXYGEN SATURATION: 98 %

## 2021-10-21 DIAGNOSIS — R06.02 SHORTNESS OF BREATH: ICD-10-CM

## 2021-10-21 DIAGNOSIS — R53.83 OTHER FATIGUE: Primary | ICD-10-CM

## 2021-10-21 PROCEDURE — 99214 OFFICE O/P EST MOD 30 MIN: CPT | Performed by: FAMILY MEDICINE

## 2021-10-21 NOTE — PROGRESS NOTES
Chief Complaint   Patient presents with   • Hospital Follow Up Visit       Subjective   Mela Muse is a 88 y.o. female.     History of Present Illness   C/o fatigue since having covid dx 9/13/21. I get winded easily while I am walking.      F/U L humeral fracture.   Had ORIF 9/21.   No pain now and doing well with this.   F/U hypothyroidism.  On levothyroxine 6 days a week.     The following portions of the patient's history were reviewed and updated as appropriate: allergies, current medications, past family history, past medical history, past social history, past surgical history and problem list.    Review of Systems   Constitutional: Negative for fever.   HENT: Negative for ear pain, rhinorrhea, sore throat and swollen glands.    Respiratory: Positive for shortness of breath. Negative for wheezing.    Cardiovascular: Negative for chest pain and leg swelling.   Gastrointestinal: Negative for abdominal pain and vomiting.   Musculoskeletal: Negative for neck pain.   Skin: Negative for rash.       Patient Active Problem List   Diagnosis   • Malignant neoplasm of breast in female, estrogen receptor positive (HCC)   • Vertigo   • History of skin cancer   • History of melanoma   • Osteoporosis   • Post-menopausal   • Dizziness   • Essential hypertension   • PAD (peripheral artery disease) ([facility])   • Claudication of both lower extremities ([facility])   • Asymptomatic bilateral carotid artery stenosis   • Osteopenia of hip   • Other fatigue   • Medicare annual wellness visit, subsequent   • Acquired hypothyroidism   • Left-sided chest pain   • COVID-19   • Closed fracture of left proximal humerus   • Shortness of breath       Allergies   Allergen Reactions   • Codeine Mental Status Change         Current Outpatient Medications:   •  amLODIPine (NORVASC) 5 MG tablet, 1/2 in am and 1/2 in pm. (Patient taking differently: Take 2.5 mg by mouth 2 (two) times a day. 1/2 in am and 1/2 in pm.), Disp: 90 tablet, Rfl: 3  •   "aspirin 81 MG chewable tablet, Chew 81 mg Daily., Disp: , Rfl:   •  calcium carbonate (OS-PAUL) 600 MG tablet, Take 600 mg by mouth Daily., Disp: , Rfl:   •  cholecalciferol (VITAMIN D3) 25 MCG (1000 UT) tablet, Take 1,000 Units by mouth Daily., Disp: , Rfl:   •  levothyroxine (SYNTHROID, LEVOTHROID) 75 MCG tablet, One a day except none on Sundays. (Patient taking differently: Take 75 mcg by mouth Every Morning. One a day except none on Sundays.), Disp: 78 tablet, Rfl: 3  •  magnesium oxide (MAGOX) 400 (241.3 Mg) MG tablet tablet, Take 400 mg by mouth Daily., Disp: , Rfl:   •  meclizine (ANTIVERT) 12.5 MG tablet, Take 12.5 mg by mouth As Needed for Dizziness., Disp: , Rfl:   •  Riboflavin (VITAMIN B2 PO), Take 1 tablet by mouth Daily., Disp: , Rfl:     Past Medical History:   Diagnosis Date   • Anxiety    • Arthritis    • Cancer (HCC) 01/2020    left breast   • Cancer of skin of leg, right 2021   • Coronary artery disease     \"BLOCKAGE IN MY LEGS\" stents bilateral legs per pt   • COVID-19 virus infection 09/13/2021   • Hemorrhoids    • History of basal cell cancer    • History of headaches    • Humerus fracture     LEFT    • Hypertension    • Hypothyroidism    • Melanoma (HCC) 2017    LEFT LEG   • PAD (peripheral artery disease) (HCC)    • Skin cancer 2015    left nose   • Skin cancer 2008    right leg   • Vertigo    • Vestibular migraine 2021       Past Surgical History:   Procedure Laterality Date   • BREAST LUMPECTOMY WITH SENTINEL NODE BIOPSY Left 2/5/2020    Procedure: BREAST LUMPECTOMY WITH NEEDLE LOCALIZATION;  Surgeon: Kush Plasencia MD;  Location: [facility] OR;  Service: General;  Laterality: Left;   • BREAST SURGERY Left 2020    BIOPSY   • COLONOSCOPY      ALL OK   • COSMETIC SURGERY      AFTER SKIN EXCISION FROM NOSE   • ENDOSCOPY     • EYE SURGERY Bilateral     CATARACTS   • FRACTURE SURGERY  9/23/21   • LEG SURGERY Left 2017    MELANOMA EXCISION-WITH SKIN GRAFT   • ORIF HUMERUS FRACTURE Left " 2021    Procedure: Open Reduction and Internal Fixation Proximal Humerus;  Surgeon: Sherman Panchal MD;  Location: [facility] OR;  Service: Orthopedics;  Laterality: Left;   • SKIN BIOPSY     • SKIN CANCER EXCISION Bilateral     NOSE, UPPER LIP   • SKIN GRAFT Bilateral    • VASCULAR SURGERY Bilateral 2018    STENTS IN BOTH UPPER LEGS       Family History   Problem Relation Age of Onset   • Heart failure Father    • Skin cancer Father    • Heart disease Father    • Breast cancer Sister 46   • Heart disease Sister    • Esophageal cancer Brother    • Heart disease Brother    • Leukemia Daughter    • Breast cancer Maternal Grandmother         70s   • Colon cancer Maternal Grandmother    • Malig Hyperthermia Neg Hx        Social History     Tobacco Use   • Smoking status: Former Smoker     Packs/day: 0.00     Years: 0.00     Pack years: 0.00     Quit date: 1970     Years since quittin.8   • Smokeless tobacco: Never Used   Substance Use Topics   • Alcohol use: Never            Objective     Vitals:    10/21/21 1343   BP: 122/56   Pulse: 66   Temp: 98.4 °F (36.9 °C)   SpO2: 98%     Body mass index is 21.14 kg/m².    Physical Exam  Vitals reviewed.   Constitutional:       Appearance: She is well-developed. She is not diaphoretic.   HENT:      Head: Normocephalic and atraumatic.   Eyes:      General: No scleral icterus.     Pupils: Pupils are equal, round, and reactive to light.   Neck:      Thyroid: No thyromegaly.   Cardiovascular:      Rate and Rhythm: Normal rate and regular rhythm.      Heart sounds: No murmur heard.  No friction rub. No gallop.    Pulmonary:      Effort: Pulmonary effort is normal. No respiratory distress.      Breath sounds: No wheezing or rales.   Chest:      Chest wall: No tenderness.   Abdominal:      General: Bowel sounds are normal. There is no distension.      Palpations: Abdomen is soft.      Tenderness: There is no abdominal tenderness.   Musculoskeletal:         General:  No deformity. Normal range of motion.   Lymphadenopathy:      Cervical: No cervical adenopathy.   Skin:     General: Skin is warm and dry.      Findings: No rash.   Neurological:      Cranial Nerves: No cranial nerve deficit.      Motor: No abnormal muscle tone.         Lab Results   Component Value Date    GLUCOSE 99 09/14/2021    BUN 26 (H) 09/14/2021    CREATININE 1.02 (H) 09/14/2021    EGFRIFNONA 51 (L) 09/14/2021    BCR 25.5 (H) 09/14/2021    K 4.4 09/14/2021    CO2 26.5 09/14/2021    CALCIUM 8.6 09/14/2021    ALBUMIN 3.70 09/14/2021    AST 16 09/14/2021    ALT 9 09/14/2021       WBC   Date Value Ref Range Status   09/14/2021 5.70 3.40 - 10.80 10*3/mm3 Final   09/17/2018 6.22 4.5 - 11.0 10*3/uL Final     RBC   Date Value Ref Range Status   09/14/2021 4.04 3.77 - 5.28 10*6/mm3 Final   09/17/2018 4.34 4.0 - 5.2 10*6/uL Final     Hemoglobin   Date Value Ref Range Status   09/14/2021 12.1 12.0 - 15.9 g/dL Final   09/17/2018 13.2 12.0 - 16.0 g/dL Final     Hematocrit   Date Value Ref Range Status   09/14/2021 36.6 34.0 - 46.6 % Final   09/17/2018 41.3 36.0 - 46.0 % Final     MCV   Date Value Ref Range Status   09/14/2021 90.6 79.0 - 97.0 fL Final   09/17/2018 95.2 80.0 - 100.0 fL Final     MCH   Date Value Ref Range Status   09/14/2021 30.0 26.6 - 33.0 pg Final   09/17/2018 30.4 26.0 - 34.0 pg Final     MCHC   Date Value Ref Range Status   09/14/2021 33.1 31.5 - 35.7 g/dL Final   09/17/2018 32.0 31.0 - 37.0 g/dL Final     RDW   Date Value Ref Range Status   09/14/2021 12.6 12.3 - 15.4 % Final   09/17/2018 13.7 12.0 - 16.8 % Final     RDW-SD   Date Value Ref Range Status   09/14/2021 41.7 37.0 - 54.0 fl Final     MPV   Date Value Ref Range Status   09/14/2021 9.5 6.0 - 12.0 fL Final   09/17/2018 9.6 6.7 - 10.8 fL Final     Platelets   Date Value Ref Range Status   09/14/2021 281 140 - 450 10*3/mm3 Final   09/17/2018 457 (H) 140 - 440 10*3/uL Final     Neutrophil Rel %   Date Value Ref Range Status   09/17/2018 62.9  45 - 80 % Final     Neutrophil %   Date Value Ref Range Status   09/14/2021 73.5 42.7 - 76.0 % Final     Lymphocyte Rel %   Date Value Ref Range Status   09/17/2018 23.0 15 - 50 % Final     Lymphocyte %   Date Value Ref Range Status   09/14/2021 13.7 (L) 19.6 - 45.3 % Final     Monocyte Rel %   Date Value Ref Range Status   09/17/2018 8.7 0 - 15 % Final     Monocyte %   Date Value Ref Range Status   09/14/2021 11.8 5.0 - 12.0 % Final     Eosinophil %   Date Value Ref Range Status   09/14/2021 0.2 (L) 0.3 - 6.2 % Final   09/17/2018 3.4 0 - 7 % Final     Basophil Rel %   Date Value Ref Range Status   09/17/2018 1.8 0 - 2 % Final     Basophil %   Date Value Ref Range Status   09/14/2021 0.4 0.0 - 1.5 % Final     Immature Grans %   Date Value Ref Range Status   09/14/2021 0.4 0.0 - 0.5 % Final   09/17/2018 0.2 (H) 0 % Final     Neutrophils Absolute   Date Value Ref Range Status   09/17/2018 3.92 2.0 - 8.8 10*3/uL Final     Neutrophils, Absolute   Date Value Ref Range Status   09/14/2021 4.20 1.70 - 7.00 10*3/mm3 Final     Lymphocytes Absolute   Date Value Ref Range Status   09/17/2018 1.43 0.7 - 5.5 10*3/uL Final     Lymphocytes, Absolute   Date Value Ref Range Status   09/14/2021 0.78 0.70 - 3.10 10*3/mm3 Final     Monocytes Absolute   Date Value Ref Range Status   09/17/2018 0.54 0.0 - 1.7 10*3/uL Final     Monocytes, Absolute   Date Value Ref Range Status   09/14/2021 0.67 0.10 - 0.90 10*3/mm3 Final     Eosinophils Absolute   Date Value Ref Range Status   09/17/2018 0.21 0.0 - 0.8 10*3/uL Final     Eosinophils, Absolute   Date Value Ref Range Status   09/14/2021 0.01 0.00 - 0.40 10*3/mm3 Final     Basophils Absolute   Date Value Ref Range Status   09/17/2018 0.11 0.0 - 0.2 10*3/uL Final     Basophils, Absolute   Date Value Ref Range Status   09/14/2021 0.02 0.00 - 0.20 10*3/mm3 Final     Immature Grans, Absolute   Date Value Ref Range Status   09/14/2021 0.02 0.00 - 0.05 10*3/mm3 Final   09/17/2018 0.01 <1 10*3/uL  Final     nRBC   Date Value Ref Range Status   09/14/2021 0.0 0.0 - 0.2 /100 WBC Final       No results found for: HGBA1C    Lab Results   Component Value Date    [id number] 448 02/27/2020       TSH   Date Value Ref Range Status   09/02/2021 1.900 0.270 - 4.200 uIU/mL Final       No results found for: CHOL  No results found for: TRIG  No results found for: HDL  No results found for: LDL  No results found for: VLDL  No results found for: LDLHDL      Procedures    Assessment/Plan   Problems Addressed this Visit     Other fatigue - Primary    Relevant Orders    CBC & Differential    Comprehensive Metabolic Panel    TSH    Vitamin B12    Shortness of breath    Relevant Orders    CBC & Differential    XR Chest 2 View    Adult Transthoracic Echo Complete W/ Cont if Necessary Per Protocol      Diagnoses       Codes Comments    Other fatigue    -  Primary ICD-10-CM: R53.83  ICD-9-CM: 780.79     Shortness of breath     ICD-10-CM: R06.02  ICD-9-CM: 786.05       Fatigue.  New problem.  Check CBC, CMP, B12, Thyroid panel.    SOA.  New problem.  Check CXR and echo.      Orders Placed This Encounter   Procedures   • XR Chest 2 View   • Comprehensive Metabolic Panel     Order Specific Question:   Release to patient     Answer:   Immediate   • TSH     Order Specific Question:   Release to patient     Answer:   Immediate   • Vitamin B12     Order Specific Question:   Release to patient     Answer:   Immediate   • Adult Transthoracic Echo Complete W/ Cont if Necessary Per Protocol     Standing Status:   Future     Order Specific Question:   Reason for exam?     Answer:   Dyspnea   • CBC & Differential     Order Specific Question:   Manual Differential     Answer:   No       Current Outpatient Medications   Medication Sig Dispense Refill   • amLODIPine (NORVASC) 5 MG tablet 1/2 in am and 1/2 in pm. (Patient taking differently: Take 2.5 mg by mouth 2 (two) times a day. 1/2 in am and 1/2 in pm.) 90 tablet 3   • aspirin 81 MG chewable  tablet Chew 81 mg Daily.     • calcium carbonate (OS-PAUL) 600 MG tablet Take 600 mg by mouth Daily.     • cholecalciferol (VITAMIN D3) 25 MCG (1000 UT) tablet Take 1,000 Units by mouth Daily.     • levothyroxine (SYNTHROID, LEVOTHROID) 75 MCG tablet One a day except none on Sundays. (Patient taking differently: Take 75 mcg by mouth Every Morning. One a day except none on Sundays.) 78 tablet 3   • magnesium oxide (MAGOX) 400 (241.3 Mg) MG tablet tablet Take 400 mg by mouth Daily.     • meclizine (ANTIVERT) 12.5 MG tablet Take 12.5 mg by mouth As Needed for Dizziness.     • Riboflavin (VITAMIN B2 PO) Take 1 tablet by mouth Daily.       No current facility-administered medications for this visit.       Mela Muse had no medications administered during this visit.    Return in about 3 weeks (around 11/11/2021).    There are no Patient Instructions on file for this visit.

## 2021-10-22 LAB
ALBUMIN SERPL-MCNC: 3.8 G/DL (ref 3.6–4.6)
ALBUMIN/GLOB SERPL: 1.5 {RATIO} (ref 1.2–2.2)
ALP SERPL-CCNC: 89 IU/L (ref 44–121)
ALT SERPL-CCNC: 7 IU/L (ref 0–32)
AST SERPL-CCNC: 18 IU/L (ref 0–40)
BASOPHILS # BLD AUTO: 0.2 X10E3/UL (ref 0–0.2)
BASOPHILS NFR BLD AUTO: 3 %
BILIRUB SERPL-MCNC: 0.3 MG/DL (ref 0–1.2)
BUN SERPL-MCNC: 22 MG/DL (ref 8–27)
BUN/CREAT SERPL: 24 (ref 12–28)
CALCIUM SERPL-MCNC: 8.9 MG/DL (ref 8.7–10.3)
CHLORIDE SERPL-SCNC: 102 MMOL/L (ref 96–106)
CO2 SERPL-SCNC: 21 MMOL/L (ref 20–29)
CREAT SERPL-MCNC: 0.93 MG/DL (ref 0.57–1)
EOSINOPHIL # BLD AUTO: 0.4 X10E3/UL (ref 0–0.4)
EOSINOPHIL NFR BLD AUTO: 7 %
ERYTHROCYTE [DISTWIDTH] IN BLOOD BY AUTOMATED COUNT: 14.2 % (ref 11.7–15.4)
GLOBULIN SER CALC-MCNC: 2.5 G/DL (ref 1.5–4.5)
GLUCOSE SERPL-MCNC: 88 MG/DL (ref 65–99)
HCT VFR BLD AUTO: 35.8 % (ref 34–46.6)
HGB BLD-MCNC: 11.3 G/DL (ref 11.1–15.9)
IMM GRANULOCYTES # BLD AUTO: 0 X10E3/UL (ref 0–0.1)
IMM GRANULOCYTES NFR BLD AUTO: 0 %
LYMPHOCYTES # BLD AUTO: 1.4 X10E3/UL (ref 0.7–3.1)
LYMPHOCYTES NFR BLD AUTO: 26 %
MCH RBC QN AUTO: 29.7 PG (ref 26.6–33)
MCHC RBC AUTO-ENTMCNC: 31.6 G/DL (ref 31.5–35.7)
MCV RBC AUTO: 94 FL (ref 79–97)
MONOCYTES # BLD AUTO: 0.5 X10E3/UL (ref 0.1–0.9)
MONOCYTES NFR BLD AUTO: 9 %
NEUTROPHILS # BLD AUTO: 3 X10E3/UL (ref 1.4–7)
NEUTROPHILS NFR BLD AUTO: 55 %
PLATELET # BLD AUTO: 390 X10E3/UL (ref 150–450)
POTASSIUM SERPL-SCNC: 4.2 MMOL/L (ref 3.5–5.2)
PROT SERPL-MCNC: 6.3 G/DL (ref 6–8.5)
RBC # BLD AUTO: 3.8 X10E6/UL (ref 3.77–5.28)
SODIUM SERPL-SCNC: 138 MMOL/L (ref 134–144)
TSH SERPL DL<=0.005 MIU/L-ACNC: 11.2 UIU/ML (ref 0.45–4.5)
VIT B12 SERPL-MCNC: 479 PG/ML (ref 232–1245)
WBC # BLD AUTO: 5.4 X10E3/UL (ref 3.4–10.8)

## 2021-10-23 DIAGNOSIS — E03.9 ACQUIRED HYPOTHYROIDISM: ICD-10-CM

## 2021-10-23 RX ORDER — LEVOTHYROXINE SODIUM 0.07 MG/1
TABLET ORAL
Qty: 90 TABLET | Refills: 3 | Status: SHIPPED | OUTPATIENT
Start: 2021-10-23 | End: 2022-03-10 | Stop reason: SDUPTHER

## 2021-10-26 ENCOUNTER — HOSPITAL ENCOUNTER (OUTPATIENT)
Dept: GENERAL RADIOLOGY | Facility: HOSPITAL | Age: 86
Discharge: HOME | End: 2021-10-26
Admitting: FAMILY MEDICINE

## 2021-10-26 PROCEDURE — 71046 X-RAY EXAM CHEST 2 VIEWS: CPT

## 2021-11-08 ENCOUNTER — OFFICE VISIT (OUTPATIENT)
Dept: ORTHOPEDIC SURGERY | Facility: CLINIC | Age: 86
End: 2021-11-08

## 2021-11-08 VITALS — BODY MASS INDEX: 23.95 KG/M2 | HEIGHT: 66 IN | TEMPERATURE: 97.3 F | WEIGHT: 149 LBS

## 2021-11-08 DIAGNOSIS — Z09 SURGERY FOLLOW-UP: Primary | ICD-10-CM

## 2021-11-08 DIAGNOSIS — S42.292A OTHER CLOSED DISPLACED FRACTURE OF PROXIMAL END OF LEFT HUMERUS, INITIAL ENCOUNTER: ICD-10-CM

## 2021-11-08 PROCEDURE — 99024 POSTOP FOLLOW-UP VISIT: CPT | Performed by: ORTHOPAEDIC SURGERY

## 2021-11-08 PROCEDURE — 73030 X-RAY EXAM OF SHOULDER: CPT | Performed by: ORTHOPAEDIC SURGERY

## 2021-11-08 NOTE — PROGRESS NOTES
Mela Muse : 1933 MRN: [id number] DATE: 2021    CC:  ORIF left proximal humerus fracture    HPI: Patient returns to clinic today stating pain is improved.  Motion is progressing.  Denies any new concerns or issues.  PT is helping.    Vitals:    21 1015   Temp: 97.3 °F (36.3 °C)       Exam: Incision is healed.  Contour of shoulder appears normal.  Skin intact and benign.  Arm and forearm soft.  Range of motion:  155 FE.  45 ER.  L2 IR.  Good motor and sensory function distally.  Palpable radial pulse with good cap refill.      Imaging: AP, scapular views of the left shoulder are ordered and reviewed by me to evaluate alignment and for comparison purposes.  No new or concerning findings noted. There has been interval callous formation.    Impression:  6 weeks s/p ORIF left proximal humerus fracture    Plan:    1.  Progress ROM and strengthening as tolerated.  2.  Begin formal PT.  3.  F/u in 6 weeks with repeat 3v xrays    Sherman Panchal MD    2021

## 2021-11-11 ENCOUNTER — HOSPITAL ENCOUNTER (OUTPATIENT)
Dept: CARDIOLOGY | Facility: HOSPITAL | Age: 86
Discharge: HOME | End: 2021-11-11
Admitting: FAMILY MEDICINE

## 2021-11-11 VITALS
HEART RATE: 70 BPM | HEIGHT: 66 IN | WEIGHT: 149 LBS | BODY MASS INDEX: 23.95 KG/M2 | SYSTOLIC BLOOD PRESSURE: 123 MMHG | DIASTOLIC BLOOD PRESSURE: 110 MMHG

## 2021-11-11 DIAGNOSIS — R06.02 SHORTNESS OF BREATH: ICD-10-CM

## 2021-11-11 PROCEDURE — 93306 TTE W/DOPPLER COMPLETE: CPT

## 2021-11-11 PROCEDURE — 93306 TTE W/DOPPLER COMPLETE: CPT | Performed by: INTERNAL MEDICINE

## 2021-11-12 LAB
AORTIC ARCH: 2 CM
BH CV ECHO MEAS - ACS: 1.2 CM
BH CV ECHO MEAS - AO MAX PG (FULL): 3.5 MMHG
BH CV ECHO MEAS - AO MAX PG: 8.6 MMHG
BH CV ECHO MEAS - AO MEAN PG (FULL): 1.7 MMHG
BH CV ECHO MEAS - AO MEAN PG: 4.9 MMHG
BH CV ECHO MEAS - AO ROOT AREA (BSA CORRECTED): 1.4
BH CV ECHO MEAS - AO ROOT AREA: 4.5 CM^2
BH CV ECHO MEAS - AO ROOT DIAM: 2.4 CM
BH CV ECHO MEAS - AO V2 MAX: 146.6 CM/SEC
BH CV ECHO MEAS - AO V2 MEAN: 105.2 CM/SEC
BH CV ECHO MEAS - AO V2 VTI: 35.3 CM
BH CV ECHO MEAS - AVA(I,A): 2.7 CM^2
BH CV ECHO MEAS - AVA(I,D): 2.7 CM^2
BH CV ECHO MEAS - AVA(V,A): 2.5 CM^2
BH CV ECHO MEAS - AVA(V,D): 2.5 CM^2
BH CV ECHO MEAS - BSA(HAYCOCK): 1.8 M^2
BH CV ECHO MEAS - BSA: 1.8 M^2
BH CV ECHO MEAS - BZI_BMI: 24 KILOGRAMS/M^2
BH CV ECHO MEAS - BZI_METRIC_HEIGHT: 167.6 CM
BH CV ECHO MEAS - BZI_METRIC_WEIGHT: 67.6 KG
BH CV ECHO MEAS - EDV(CUBED): 46.9 ML
BH CV ECHO MEAS - EDV(MOD-SP2): 50 ML
BH CV ECHO MEAS - EDV(MOD-SP4): 41 ML
BH CV ECHO MEAS - EDV(TEICH): 54.6 ML
BH CV ECHO MEAS - EF(CUBED): 78.6 %
BH CV ECHO MEAS - EF(MOD-BP): 62.8 %
BH CV ECHO MEAS - EF(MOD-SP2): 58 %
BH CV ECHO MEAS - EF(MOD-SP4): 65.9 %
BH CV ECHO MEAS - EF(TEICH): 71.8 %
BH CV ECHO MEAS - ESV(CUBED): 10.1 ML
BH CV ECHO MEAS - ESV(MOD-SP2): 21 ML
BH CV ECHO MEAS - ESV(MOD-SP4): 14 ML
BH CV ECHO MEAS - ESV(TEICH): 15.4 ML
BH CV ECHO MEAS - FS: 40.1 %
BH CV ECHO MEAS - IVS/LVPW: 0.99
BH CV ECHO MEAS - IVSD: 1 CM
BH CV ECHO MEAS - LAT PEAK E' VEL: 7.4 CM/SEC
BH CV ECHO MEAS - LV DIASTOLIC VOL/BSA (35-75): 23.2 ML/M^2
BH CV ECHO MEAS - LV MASS(C)D: 115.4 GRAMS
BH CV ECHO MEAS - LV MASS(C)DI: 65.4 GRAMS/M^2
BH CV ECHO MEAS - LV MAX PG: 5.1 MMHG
BH CV ECHO MEAS - LV MEAN PG: 3.2 MMHG
BH CV ECHO MEAS - LV SYSTOLIC VOL/BSA (12-30): 7.9 ML/M^2
BH CV ECHO MEAS - LV V1 MAX: 113.1 CM/SEC
BH CV ECHO MEAS - LV V1 MEAN: 85 CM/SEC
BH CV ECHO MEAS - LV V1 VTI: 29.6 CM
BH CV ECHO MEAS - LVIDD: 3.6 CM
BH CV ECHO MEAS - LVIDS: 2.2 CM
BH CV ECHO MEAS - LVLD AP2: 6.1 CM
BH CV ECHO MEAS - LVLD AP4: 5.7 CM
BH CV ECHO MEAS - LVLS AP2: 4.7 CM
BH CV ECHO MEAS - LVLS AP4: 4.7 CM
BH CV ECHO MEAS - LVOT AREA (M): 3.1 CM^2
BH CV ECHO MEAS - LVOT AREA: 3.2 CM^2
BH CV ECHO MEAS - LVOT DIAM: 2 CM
BH CV ECHO MEAS - LVPWD: 1.1 CM
BH CV ECHO MEAS - MED PEAK E' VEL: 6.5 CM/SEC
BH CV ECHO MEAS - MR MAX PG: 65.4 MMHG
BH CV ECHO MEAS - MR MAX VEL: 404.5 CM/SEC
BH CV ECHO MEAS - MV A DUR: 0.16 SEC
BH CV ECHO MEAS - MV A MAX VEL: 73.7 CM/SEC
BH CV ECHO MEAS - MV DEC SLOPE: 452.3 CM/SEC^2
BH CV ECHO MEAS - MV DEC TIME: 0.23 SEC
BH CV ECHO MEAS - MV E MAX VEL: 90 CM/SEC
BH CV ECHO MEAS - MV E/A: 1.2
BH CV ECHO MEAS - MV MAX PG: 5 MMHG
BH CV ECHO MEAS - MV MEAN PG: 1.7 MMHG
BH CV ECHO MEAS - MV P1/2T MAX VEL: 119.4 CM/SEC
BH CV ECHO MEAS - MV P1/2T: 77.3 MSEC
BH CV ECHO MEAS - MV V2 MAX: 111.3 CM/SEC
BH CV ECHO MEAS - MV V2 MEAN: 56.7 CM/SEC
BH CV ECHO MEAS - MV V2 VTI: 35.5 CM
BH CV ECHO MEAS - MVA P1/2T LCG: 1.8 CM^2
BH CV ECHO MEAS - MVA(P1/2T): 2.8 CM^2
BH CV ECHO MEAS - MVA(VTI): 2.7 CM^2
BH CV ECHO MEAS - PA ACC TIME: 0.16 SEC
BH CV ECHO MEAS - PA MAX PG (FULL): 0.69 MMHG
BH CV ECHO MEAS - PA MAX PG: 2.2 MMHG
BH CV ECHO MEAS - PA PR(ACCEL): 8.9 MMHG
BH CV ECHO MEAS - PA V2 MAX: 74.7 CM/SEC
BH CV ECHO MEAS - RAP SYSTOLE: 3 MMHG
BH CV ECHO MEAS - RV MAX PG: 1.5 MMHG
BH CV ECHO MEAS - RV MEAN PG: 0.98 MMHG
BH CV ECHO MEAS - RV V1 MAX: 62.1 CM/SEC
BH CV ECHO MEAS - RV V1 MEAN: 47.3 CM/SEC
BH CV ECHO MEAS - RV V1 VTI: 15.4 CM
BH CV ECHO MEAS - RVSP: 23.1 MMHG
BH CV ECHO MEAS - SI(AO): 89.6 ML/M^2
BH CV ECHO MEAS - SI(CUBED): 20.9 ML/M^2
BH CV ECHO MEAS - SI(LVOT): 54.4 ML/M^2
BH CV ECHO MEAS - SI(MOD-SP2): 16.4 ML/M^2
BH CV ECHO MEAS - SI(MOD-SP4): 15.3 ML/M^2
BH CV ECHO MEAS - SI(TEICH): 22.2 ML/M^2
BH CV ECHO MEAS - SUP REN AO DIAM: 1.6 CM
BH CV ECHO MEAS - SV(AO): 158.2 ML
BH CV ECHO MEAS - SV(CUBED): 36.8 ML
BH CV ECHO MEAS - SV(LVOT): 96 ML
BH CV ECHO MEAS - SV(MOD-SP2): 29 ML
BH CV ECHO MEAS - SV(MOD-SP4): 27 ML
BH CV ECHO MEAS - SV(TEICH): 39.2 ML
BH CV ECHO MEAS - TAPSE (>1.6): 1.7 CM
BH CV ECHO MEAS - TR MAX VEL: 224 CM/SEC
BH CV ECHO MEASUREMENTS AVERAGE E/E' RATIO: 12.95
BH CV XLRA - RV BASE: 2.4 CM
BH CV XLRA - RV LENGTH: 4.6 CM
BH CV XLRA - RV MID: 2.2 CM
BH CV XLRA - TDI S': 10.4 CM/SEC
LEFT ATRIUM VOLUME INDEX: 20 ML/M2
MAXIMAL PREDICTED HEART RATE: 132 BPM
SINUS: 2.8 CM
STJ: 2.4 CM
STRESS TARGET HR: 112 BPM

## 2021-11-16 ENCOUNTER — OFFICE VISIT (OUTPATIENT)
Dept: FAMILY MEDICINE CLINIC | Facility: CLINIC | Age: 86
End: 2021-11-16

## 2021-11-16 VITALS
OXYGEN SATURATION: 99 % | SYSTOLIC BLOOD PRESSURE: 116 MMHG | WEIGHT: 130 LBS | TEMPERATURE: 97.3 F | BODY MASS INDEX: 20.89 KG/M2 | HEIGHT: 66 IN | HEART RATE: 74 BPM | DIASTOLIC BLOOD PRESSURE: 66 MMHG

## 2021-11-16 DIAGNOSIS — R06.02 SHORTNESS OF BREATH: Primary | ICD-10-CM

## 2021-11-16 DIAGNOSIS — I10 ESSENTIAL HYPERTENSION: ICD-10-CM

## 2021-11-16 PROCEDURE — 99214 OFFICE O/P EST MOD 30 MIN: CPT | Performed by: FAMILY MEDICINE

## 2021-11-16 NOTE — PROGRESS NOTES
Chief Complaint   Patient presents with   • Hypertension       Subjective   Mela Muse is a 88 y.o. female.     History of Present Illness   F/U HTN.  No orhtostasis.  Doing well with meds.   F/U hypothyroidism.  Doing well with meds.  TSH elevated 11.2 from 3 weeks ago.   On 7 days a week on levothyroxine now.    C/o persistent SOA.   Noticed for months.  Still with SOA.  Can be when I am just sitting.   Echo normal from 11/21.  CBC normal.  Noted after covid 9/13/21.   Occurred after radiation tx for breast cancer in 2020 as well.  CXR 4/21 was normal.        The following portions of the patient's history were reviewed and updated as appropriate: allergies, current medications, past family history, past medical history, past social history, past surgical history and problem list.    Review of Systems   Constitutional: Negative for appetite change and fatigue.   HENT: Negative for nosebleeds and sore throat.    Eyes: Negative for blurred vision and visual disturbance.   Respiratory: Positive for shortness of breath. Negative for wheezing.    Cardiovascular: Negative for chest pain and leg swelling.   Gastrointestinal: Negative for abdominal distention and abdominal pain.   Endocrine: Negative for cold intolerance and polyuria.   Genitourinary: Negative for dysuria and hematuria.   Musculoskeletal: Negative for arthralgias and myalgias.   Skin: Negative for color change and rash.   Neurological: Negative for weakness and confusion.   Psychiatric/Behavioral: Negative for agitation and depressed mood.       Patient Active Problem List   Diagnosis   • Malignant neoplasm of breast in female, estrogen receptor positive (HCC)   • Vertigo   • History of skin cancer   • History of melanoma   • Osteoporosis   • Post-menopausal   • Dizziness   • Essential hypertension   • PAD (peripheral artery disease) (HCC)   • Claudication of both lower extremities (HCC)   • Asymptomatic bilateral carotid artery stenosis   •  "Osteopenia of hip   • Other fatigue   • Medicare annual wellness visit, subsequent   • Acquired hypothyroidism   • Left-sided chest pain   • COVID-19   • Closed fracture of left proximal humerus   • Shortness of breath       Allergies   Allergen Reactions   • Codeine Mental Status Change         Current Outpatient Medications:   •  amLODIPine (NORVASC) 5 MG tablet, 1/2 in am and 1/2 in pm. (Patient taking differently: Take 2.5 mg by mouth 2 (two) times a day. 1/2 in am and 1/2 in pm.), Disp: 90 tablet, Rfl: 3  •  aspirin 81 MG chewable tablet, Chew 81 mg Daily., Disp: , Rfl:   •  calcium carbonate (OS-PAUL) 600 MG tablet, Take 600 mg by mouth Daily., Disp: , Rfl:   •  cholecalciferol (VITAMIN D3) 25 MCG (1000 UT) tablet, Take 1,000 Units by mouth Daily., Disp: , Rfl:   •  levothyroxine (SYNTHROID, LEVOTHROID) 75 MCG tablet, One a day, Disp: 90 tablet, Rfl: 3  •  magnesium oxide (MAGOX) 400 (241.3 Mg) MG tablet tablet, Take 400 mg by mouth Daily., Disp: , Rfl:   •  meclizine (ANTIVERT) 12.5 MG tablet, Take 12.5 mg by mouth As Needed for Dizziness., Disp: , Rfl:   •  Riboflavin (VITAMIN B2 PO), Take 1 tablet by mouth Daily., Disp: , Rfl:     Past Medical History:   Diagnosis Date   • Anxiety    • Arthritis    • Cancer (HCC) 01/2020    left breast   • Cancer of skin of leg, right 2021   • Coronary artery disease     \"BLOCKAGE IN MY LEGS\" stents bilateral legs per pt   • COVID-19 virus infection 09/13/2021   • Hemorrhoids    • History of basal cell cancer    • History of headaches    • Humerus fracture     LEFT    • Hypertension    • Hypothyroidism    • Melanoma (HCC) 2017    LEFT LEG   • PAD (peripheral artery disease) (HCC)    • Skin cancer 2015    left nose   • Skin cancer 2008    right leg   • Vertigo    • Vestibular migraine 2021       Past Surgical History:   Procedure Laterality Date   • BREAST LUMPECTOMY WITH SENTINEL NODE BIOPSY Left 2/5/2020    Procedure: BREAST LUMPECTOMY WITH NEEDLE LOCALIZATION;  Surgeon: " Kush Plasencia MD;  Location: [facility];  Service: General;  Laterality: Left;   • BREAST SURGERY Left     BIOPSY   • COLONOSCOPY      ALL OK   • COSMETIC SURGERY      AFTER SKIN EXCISION FROM NOSE   • ENDOSCOPY     • EYE SURGERY Bilateral     CATARACTS   • FRACTURE SURGERY  21   • LEG SURGERY Left 2017    MELANOMA EXCISION-WITH SKIN GRAFT   • ORIF HUMERUS FRACTURE Left 2021    Procedure: Open Reduction and Internal Fixation Proximal Humerus;  Surgeon: Sherman Panchal MD;  Location: [facility];  Service: Orthopedics;  Laterality: Left;   • SKIN BIOPSY  2008   • SKIN CANCER EXCISION Bilateral 2015    NOSE, UPPER LIP   • SKIN GRAFT Bilateral    • VASCULAR SURGERY Bilateral 2018    STENTS IN BOTH UPPER LEGS       Family History   Problem Relation Age of Onset   • Heart failure Father    • Skin cancer Father    • Heart disease Father    • Breast cancer Sister 46   • Heart disease Sister    • Esophageal cancer Brother    • Heart disease Brother    • Leukemia Daughter    • Breast cancer Maternal Grandmother         70s   • Colon cancer Maternal Grandmother    • Malig Hyperthermia Neg Hx        Social History     Tobacco Use   • Smoking status: Former Smoker     Packs/day: 0.00     Years: 0.00     Pack years: 0.00     Quit date: 1970     Years since quittin.9   • Smokeless tobacco: Never Used   Substance Use Topics   • Alcohol use: Never            Objective     Vitals:    21 0925   BP: 116/66   Pulse: 74   Temp: 97.3 °F (36.3 °C)   SpO2: 99%     Body mass index is 20.98 kg/m².    Physical Exam  Vitals reviewed.   Constitutional:       Appearance: She is well-developed. She is not diaphoretic.   HENT:      Head: Normocephalic and atraumatic.   Eyes:      General: No scleral icterus.     Pupils: Pupils are equal, round, and reactive to light.   Neck:      Thyroid: No thyromegaly.   Cardiovascular:      Rate and Rhythm: Normal rate and regular rhythm.      Heart sounds: No murmur  heard.  No friction rub. No gallop.    Pulmonary:      Effort: Pulmonary effort is normal. No respiratory distress.      Breath sounds: No wheezing or rales.   Chest:      Chest wall: No tenderness.   Abdominal:      General: Bowel sounds are normal. There is no distension.      Palpations: Abdomen is soft.      Tenderness: There is no abdominal tenderness.   Musculoskeletal:         General: No deformity. Normal range of motion.   Lymphadenopathy:      Cervical: No cervical adenopathy.   Skin:     General: Skin is warm and dry.      Findings: No rash.   Neurological:      Cranial Nerves: No cranial nerve deficit.      Motor: No abnormal muscle tone.         Lab Results   Component Value Date    GLUCOSE 88 10/21/2021    BUN 22 10/21/2021    CREATININE 0.93 10/21/2021    EGFRIFNONA 55 (L) 10/21/2021    EGFRIFAFRI 63 10/21/2021    BCR 24 10/21/2021    K 4.2 10/21/2021    CO2 21 10/21/2021    CALCIUM 8.9 10/21/2021    PROTENTOTREF 6.3 10/21/2021    ALBUMIN 3.8 10/21/2021    LABIL2 1.5 10/21/2021    AST 18 10/21/2021    ALT 7 10/21/2021       WBC   Date Value Ref Range Status   10/21/2021 5.4 3.4 - 10.8 x10E3/uL Final   09/17/2018 6.22 4.5 - 11.0 10*3/uL Final     RBC   Date Value Ref Range Status   10/21/2021 3.80 3.77 - 5.28 x10E6/uL Final   09/17/2018 4.34 4.0 - 5.2 10*6/uL Final     Hemoglobin   Date Value Ref Range Status   10/21/2021 11.3 11.1 - 15.9 g/dL Final   09/14/2021 12.1 12.0 - 15.9 g/dL Final   09/17/2018 13.2 12.0 - 16.0 g/dL Final     Hematocrit   Date Value Ref Range Status   10/21/2021 35.8 34.0 - 46.6 % Final   09/14/2021 36.6 34.0 - 46.6 % Final   09/17/2018 41.3 36.0 - 46.0 % Final     MCV   Date Value Ref Range Status   10/21/2021 94 79 - 97 fL Final   09/14/2021 90.6 79.0 - 97.0 fL Final   09/17/2018 95.2 80.0 - 100.0 fL Final     MCH   Date Value Ref Range Status   10/21/2021 29.7 26.6 - 33.0 pg Final   09/14/2021 30.0 26.6 - 33.0 pg Final   09/17/2018 30.4 26.0 - 34.0 pg Final     MCHC   Date  Value Ref Range Status   10/21/2021 31.6 31.5 - 35.7 g/dL Final   09/14/2021 33.1 31.5 - 35.7 g/dL Final   09/17/2018 32.0 31.0 - 37.0 g/dL Final     RDW   Date Value Ref Range Status   10/21/2021 14.2 11.7 - 15.4 % Final   09/14/2021 12.6 12.3 - 15.4 % Final   09/17/2018 13.7 12.0 - 16.8 % Final     RDW-SD   Date Value Ref Range Status   09/14/2021 41.7 37.0 - 54.0 fl Final     MPV   Date Value Ref Range Status   09/14/2021 9.5 6.0 - 12.0 fL Final   09/17/2018 9.6 6.7 - 10.8 fL Final     Platelets   Date Value Ref Range Status   10/21/2021 390 150 - 450 x10E3/uL Final   09/14/2021 281 140 - 450 10*3/mm3 Final   09/17/2018 457 (H) 140 - 440 10*3/uL Final     Neutrophil Rel %   Date Value Ref Range Status   10/21/2021 55 Not Estab. % Final   09/17/2018 62.9 45 - 80 % Final     Neutrophil %   Date Value Ref Range Status   09/14/2021 73.5 42.7 - 76.0 % Final     Lymphocyte Rel %   Date Value Ref Range Status   10/21/2021 26 Not Estab. % Final   09/17/2018 23.0 15 - 50 % Final     Lymphocyte %   Date Value Ref Range Status   09/14/2021 13.7 (L) 19.6 - 45.3 % Final     Monocyte Rel %   Date Value Ref Range Status   10/21/2021 9 Not Estab. % Final   09/17/2018 8.7 0 - 15 % Final     Monocyte %   Date Value Ref Range Status   09/14/2021 11.8 5.0 - 12.0 % Final     Eosinophil Rel %   Date Value Ref Range Status   10/21/2021 7 Not Estab. % Final     Eosinophil %   Date Value Ref Range Status   09/14/2021 0.2 (L) 0.3 - 6.2 % Final   09/17/2018 3.4 0 - 7 % Final     Basophil Rel %   Date Value Ref Range Status   10/21/2021 3 Not Estab. % Final   09/17/2018 1.8 0 - 2 % Final     Basophil %   Date Value Ref Range Status   09/14/2021 0.4 0.0 - 1.5 % Final     Immature Grans %   Date Value Ref Range Status   09/14/2021 0.4 0.0 - 0.5 % Final   09/17/2018 0.2 (H) 0 % Final     Neutrophils Absolute   Date Value Ref Range Status   10/21/2021 3.0 1.4 - 7.0 x10E3/uL Final   09/17/2018 3.92 2.0 - 8.8 10*3/uL Final     Neutrophils,  Absolute   Date Value Ref Range Status   09/14/2021 4.20 1.70 - 7.00 10*3/mm3 Final     Lymphocytes Absolute   Date Value Ref Range Status   10/21/2021 1.4 0.7 - 3.1 x10E3/uL Final   09/17/2018 1.43 0.7 - 5.5 10*3/uL Final     Lymphocytes, Absolute   Date Value Ref Range Status   09/14/2021 0.78 0.70 - 3.10 10*3/mm3 Final     Monocytes Absolute   Date Value Ref Range Status   10/21/2021 0.5 0.1 - 0.9 x10E3/uL Final   09/17/2018 0.54 0.0 - 1.7 10*3/uL Final     Monocytes, Absolute   Date Value Ref Range Status   09/14/2021 0.67 0.10 - 0.90 10*3/mm3 Final     Eosinophils Absolute   Date Value Ref Range Status   10/21/2021 0.4 0.0 - 0.4 x10E3/uL Final   09/17/2018 0.21 0.0 - 0.8 10*3/uL Final     Eosinophils, Absolute   Date Value Ref Range Status   09/14/2021 0.01 0.00 - 0.40 10*3/mm3 Final     Basophils Absolute   Date Value Ref Range Status   10/21/2021 0.2 0.0 - 0.2 x10E3/uL Final   09/17/2018 0.11 0.0 - 0.2 10*3/uL Final     Basophils, Absolute   Date Value Ref Range Status   09/14/2021 0.02 0.00 - 0.20 10*3/mm3 Final     Immature Grans, Absolute   Date Value Ref Range Status   09/14/2021 0.02 0.00 - 0.05 10*3/mm3 Final   09/17/2018 0.01 <1 10*3/uL Final     nRBC   Date Value Ref Range Status   09/14/2021 0.0 0.0 - 0.2 /100 WBC Final       No results found for: HGBA1C    Lab Results   Component Value Date    KENOBUHC21 479 10/21/2021       TSH   Date Value Ref Range Status   10/21/2021 11.200 (H) 0.450 - 4.500 uIU/mL Final       No results found for: CHOL  No results found for: TRIG  No results found for: HDL  No results found for: LDL  No results found for: VLDL  No results found for: LDLHDL      Procedures    Assessment/Plan   Problems Addressed this Visit     Essential hypertension    Shortness of breath - Primary    Relevant Orders    CT Chest Hi Resolution      Diagnoses       Codes Comments    Shortness of breath    -  Primary ICD-10-CM: R06.02  ICD-9-CM: 786.05     Essential hypertension     ICD-10-CM:  I10  ICD-9-CM: 401.9       SOA.   Chronic since 2020.  Uncontrolled.  Check CT high res of chest.    HTN.  Controlled.  Continue meds.    Get covid booster in 12/21(had antibody infusion)    Orders Placed This Encounter   Procedures   • CT Chest Hi Resolution     Standing Status:   Future     Standing Expiration Date:   11/16/2022     Order Specific Question:   Release to patient     Answer:   Immediate       Current Outpatient Medications   Medication Sig Dispense Refill   • amLODIPine (NORVASC) 5 MG tablet 1/2 in am and 1/2 in pm. (Patient taking differently: Take 2.5 mg by mouth 2 (two) times a day. 1/2 in am and 1/2 in pm.) 90 tablet 3   • aspirin 81 MG chewable tablet Chew 81 mg Daily.     • calcium carbonate (OS-PAUL) 600 MG tablet Take 600 mg by mouth Daily.     • cholecalciferol (VITAMIN D3) 25 MCG (1000 UT) tablet Take 1,000 Units by mouth Daily.     • levothyroxine (SYNTHROID, LEVOTHROID) 75 MCG tablet One a day 90 tablet 3   • magnesium oxide (MAGOX) 400 (241.3 Mg) MG tablet tablet Take 400 mg by mouth Daily.     • meclizine (ANTIVERT) 12.5 MG tablet Take 12.5 mg by mouth As Needed for Dizziness.     • Riboflavin (VITAMIN B2 PO) Take 1 tablet by mouth Daily.       No current facility-administered medications for this visit.       Mela Muse had no medications administered during this visit.    Return in about 3 months (around 2/16/2022).    There are no Patient Instructions on file for this visit.

## 2021-12-03 ENCOUNTER — HOSPITAL ENCOUNTER (OUTPATIENT)
Dept: CT IMAGING | Facility: HOSPITAL | Age: 86
Discharge: HOME | End: 2021-12-03
Admitting: FAMILY MEDICINE

## 2021-12-03 DIAGNOSIS — R06.02 SHORTNESS OF BREATH: ICD-10-CM

## 2021-12-03 PROCEDURE — 71250 CT THORAX DX C-: CPT

## 2021-12-07 DIAGNOSIS — R06.02 SHORTNESS OF BREATH: ICD-10-CM

## 2021-12-07 DIAGNOSIS — R91.8 LEFT UPPER LOBE PULMONARY INFILTRATE: Primary | ICD-10-CM

## 2021-12-10 ENCOUNTER — APPOINTMENT (OUTPATIENT)
Dept: WOMENS IMAGING | Facility: HOSPITAL | Age: 86
End: 2021-12-10

## 2021-12-10 PROCEDURE — 77067 SCR MAMMO BI INCL CAD: CPT | Performed by: RADIOLOGY

## 2021-12-10 PROCEDURE — 77063 BREAST TOMOSYNTHESIS BI: CPT | Performed by: RADIOLOGY

## 2021-12-14 ENCOUNTER — OFFICE VISIT (OUTPATIENT)
Dept: ONCOLOGY | Facility: CLINIC | Age: 86
End: 2021-12-14

## 2021-12-14 ENCOUNTER — LAB (OUTPATIENT)
Dept: LAB | Facility: HOSPITAL | Age: 86
End: 2021-12-14

## 2021-12-14 VITALS
SYSTOLIC BLOOD PRESSURE: 162 MMHG | TEMPERATURE: 97.3 F | HEART RATE: 64 BPM | RESPIRATION RATE: 16 BRPM | OXYGEN SATURATION: 98 % | WEIGHT: 133 LBS | HEIGHT: 66 IN | BODY MASS INDEX: 21.38 KG/M2 | DIASTOLIC BLOOD PRESSURE: 70 MMHG

## 2021-12-14 DIAGNOSIS — Z78.0 POST-MENOPAUSAL: ICD-10-CM

## 2021-12-14 DIAGNOSIS — Z17.0 MALIGNANT NEOPLASM OF BREAST IN FEMALE, ESTROGEN RECEPTOR POSITIVE, UNSPECIFIED LATERALITY, UNSPECIFIED SITE OF BREAST (HCC): ICD-10-CM

## 2021-12-14 DIAGNOSIS — C50.919 MALIGNANT NEOPLASM OF BREAST IN FEMALE, ESTROGEN RECEPTOR POSITIVE, UNSPECIFIED LATERALITY, UNSPECIFIED SITE OF BREAST (HCC): ICD-10-CM

## 2021-12-14 DIAGNOSIS — C50.919 MALIGNANT NEOPLASM OF BREAST IN FEMALE, ESTROGEN RECEPTOR POSITIVE, UNSPECIFIED LATERALITY, UNSPECIFIED SITE OF BREAST (HCC): Primary | ICD-10-CM

## 2021-12-14 DIAGNOSIS — Z17.0 MALIGNANT NEOPLASM OF BREAST IN FEMALE, ESTROGEN RECEPTOR POSITIVE, UNSPECIFIED LATERALITY, UNSPECIFIED SITE OF BREAST (HCC): Primary | ICD-10-CM

## 2021-12-14 LAB
ALBUMIN SERPL-MCNC: 3.9 G/DL (ref 3.5–5.2)
ALBUMIN/GLOB SERPL: 1.3 G/DL (ref 1.1–2.4)
ALP SERPL-CCNC: 76 U/L (ref 38–116)
ALT SERPL W P-5'-P-CCNC: 19 U/L (ref 0–33)
ANION GAP SERPL CALCULATED.3IONS-SCNC: 9.6 MMOL/L (ref 5–15)
AST SERPL-CCNC: 25 U/L (ref 0–32)
BASOPHILS # BLD AUTO: 0.1 10*3/MM3 (ref 0–0.2)
BASOPHILS NFR BLD AUTO: 1.7 % (ref 0–1.5)
BILIRUB SERPL-MCNC: 0.2 MG/DL (ref 0.2–1.2)
BUN SERPL-MCNC: 19 MG/DL (ref 6–20)
BUN/CREAT SERPL: 18.4 (ref 7.3–30)
CALCIUM SPEC-SCNC: 9.6 MG/DL (ref 8.5–10.2)
CHLORIDE SERPL-SCNC: 103 MMOL/L (ref 98–107)
CO2 SERPL-SCNC: 27.4 MMOL/L (ref 22–29)
CREAT SERPL-MCNC: 1.03 MG/DL (ref 0.6–1.1)
DEPRECATED RDW RBC AUTO: 47.6 FL (ref 37–54)
EOSINOPHIL # BLD AUTO: 0.25 10*3/MM3 (ref 0–0.4)
EOSINOPHIL NFR BLD AUTO: 4.2 % (ref 0.3–6.2)
ERYTHROCYTE [DISTWIDTH] IN BLOOD BY AUTOMATED COUNT: 13.2 % (ref 12.3–15.4)
GFR SERPL CREATININE-BSD FRML MDRD: 51 ML/MIN/1.73
GLOBULIN UR ELPH-MCNC: 3.1 GM/DL (ref 1.8–3.5)
GLUCOSE SERPL-MCNC: 116 MG/DL (ref 74–124)
HCT VFR BLD AUTO: 40.1 % (ref 34–46.6)
HGB BLD-MCNC: 12.3 G/DL (ref 12–15.9)
IMM GRANULOCYTES # BLD AUTO: 0.01 10*3/MM3 (ref 0–0.05)
IMM GRANULOCYTES NFR BLD AUTO: 0.2 % (ref 0–0.5)
LYMPHOCYTES # BLD AUTO: 1.79 10*3/MM3 (ref 0.7–3.1)
LYMPHOCYTES NFR BLD AUTO: 30.4 % (ref 19.6–45.3)
MCH RBC QN AUTO: 29.9 PG (ref 26.6–33)
MCHC RBC AUTO-ENTMCNC: 30.7 G/DL (ref 31.5–35.7)
MCV RBC AUTO: 97.3 FL (ref 79–97)
MONOCYTES # BLD AUTO: 0.46 10*3/MM3 (ref 0.1–0.9)
MONOCYTES NFR BLD AUTO: 7.8 % (ref 5–12)
NEUTROPHILS NFR BLD AUTO: 3.28 10*3/MM3 (ref 1.7–7)
NEUTROPHILS NFR BLD AUTO: 55.7 % (ref 42.7–76)
NRBC BLD AUTO-RTO: 0 /100 WBC (ref 0–0.2)
PLATELET # BLD AUTO: 388 10*3/MM3 (ref 140–450)
PMV BLD AUTO: 9 FL (ref 6–12)
POTASSIUM SERPL-SCNC: 4.4 MMOL/L (ref 3.5–4.7)
PROT SERPL-MCNC: 7 G/DL (ref 6.3–8)
RBC # BLD AUTO: 4.12 10*6/MM3 (ref 3.77–5.28)
SODIUM SERPL-SCNC: 140 MMOL/L (ref 134–145)
WBC NRBC COR # BLD: 5.89 10*3/MM3 (ref 3.4–10.8)

## 2021-12-14 PROCEDURE — 36415 COLL VENOUS BLD VENIPUNCTURE: CPT

## 2021-12-14 PROCEDURE — 80053 COMPREHEN METABOLIC PANEL: CPT

## 2021-12-14 PROCEDURE — 85025 COMPLETE CBC W/AUTO DIFF WBC: CPT

## 2021-12-14 PROCEDURE — 99213 OFFICE O/P EST LOW 20 MIN: CPT | Performed by: INTERNAL MEDICINE

## 2021-12-14 NOTE — PROGRESS NOTES
Subjective       REASON FOR FOLLOW UP:    1. pT1c pNX Invasive Lobular Carcinoma of the Left Breast measuring 20 mm, grade II.  Her tumor was ER+ 96%, LA+ 96%, and HER2/clayton Negative (0) with Ki67 of 3.34%.      2.  Patient was to start Arimidex February 2020 but has not started.  On further discussion she wants to avoid any medications including tamoxifen.    3.  Currently receiving radiation     4.  Osteopenia, had a bone density which I have reviewed with the patient.                               HISTORY OF PRESENT ILLNESS:      Patient is a 88-year-old female with history of T1c NX invasive lobular carcinoma of the left breast, 20 mm, ER LA positive HER-2/clayton negative who was to start Arimidex . Patient had refused any endocrine therapy and currently is followed with observation. She does do her self breast exam monthly. Her last screening mammogram was May 2021 which was negative. She did fall and hurt her arm and had to undergo surgery in September 2021. So she still has pain and has to undergo physical therapy.    Patient had CT chest ordered by her primary care physician because of shortness of breath which showed there was some small airspace consolidation in the right apex which was not present in 2006 but age indeterminate. It could be acute or subacute infectious infiltrate but a conservative surveillance with repeat CT suggested in 3 months. The parenchymal and pleural thickening in the anterior aspect of the left upper lobe was likely secondary to left breast radiation. Patient has follow-up appointment with pulmonary physician in the next couple weeks.    Oncologic history:  The patient is a 88 y.o. year old female who is here for an opinion about the above issue.    She is currently followed by PCP Dr. Yuniel Bonilla.  Patient had never had mammograms since 2013.  In October 2019 she noted indentation of the skin in the breast.  She did not have pain but she did feel a marble size lump at the  time.  She had a screening mammogram in November 2019 which had revealed a 12 mm mass in the middle one-third region of the left breast at 1:00 position 4 cmfn.  She, then, went for a diagnostic mammogram and ultrasound in December.  The mammogram revealed an irregular 15 mm mass in the left breast at the 12:00 position and the ultrasound revealed an irregular [id number] mm mass in the left breast at the 12:00 position 4 cmfn and an oval elongated mass measuring 7x3x5 mm in the left breast at the 1:00 position.  She was referred to Dr. Plasencia who performed an US-guided biopsy which showed Invasive Carcinoma with Ductal and Lobular features, low grade, measuring 15 mm, ER+ 96%, SD+ 96%, HER2/clayton negative (0), Ki67 3%.  The mass at the 1:00 position was a fibroadenoma.  She underwent a left breast lumpectomy on February 5, 2020 which confirmed Invasive Lobular Carcinoma of the Left Breast measuring 20 mm, grade II.  Her tumor was ER+ 96%, SD+ 96%, and HER2/clayton Negative (0).    Her personal history consists of vertigo, skin cancer of right leg in 2008 and of left nose in 2015.  She had a melanoma in 2017.    Her family history consists of her sister who had breast cancer at age 46.  Her maternal grandmother had breast cancer in her 70s.  Her daughter has chronic lymphocytic leukemia.        Details of the diagnostics scans and procedures are as follows:    11/27/19 - Screening Mammogram  Finding 1: There is a new irregular mass measuring 12 mm with spiculated margins seen inthe middle one-third region of the left beast at the 1:00 position located 4 cmfn.    Finding 2: There are vascular calcifications seen in both breasts.  No suspicious masses in the right breast.    12/26/19 - Diagnostic Mammogram  Finding 1: There is an irregular mass measuring 15 mm with spiculated margins in the upper outer region of the left breast 4 cmfn.      Ultrasound:  Finding 1: Irregular hypoechoic mass measuring [id number] mm at 12:00  4cmfn.    Finding 2: There is an oval elongated mass with indistinct margins measuring 7x3x5 mm in the left breast at 1:00, 1.7 cm from the mass at 12:00.    01/09/20 - US-Guided Biopsy  Left Breast 12:00 Position   Invasive Carcinoma with Ductal and Lobular features, low grade, measuring 15 mm, ER+ 96%, OK+ 96%, HER2/clayton negative (0), Ki67 3%    Left Breast 1:00 Position  Fibroadenoma    02/05/20 - Left Breast Lumpectomy   A. Invasive lobular carcinoma:                            1. Invasive carcinoma measures 20 mm x  14 mm x 10 mm.                            2. Overall [place] grade II (tubular score = 3, nuclear score = 2,                                mitotic score = 1).                            3. No lymphovascular invasion identified.                            4. Microcalcifications are present in the invasive component.               B. No carcinoma in situ component identified.               C. All margins are negative for invasive carcinoma.                    Carcinoma measures 2.0 mm from the closest (Medial) margin of excision.                     All other margins measure at least 4.0 mm from invasive carcinoma including:                            Anterior margin = 5 mm                            Posterior margin = 10 mm                            Superior margin = 20 mm                            Inferior margin =15mm                             Lateral margin = 4 mm                            Medial margin = 2 mm                  D. Rare focus of atypical lobular hyperplasia (ALH) noted, Not present at the margin. Focal fibroadenoma                    involved by invasive lobular carcinoma noted, Not present at the margin.               E. Focal biopsy site changes are identified.               F. Focal atypical ductal hyperplasia noted, not present at the margin.               G. Non-neoplastic breast tissue with intraductal papilloma, adenosis and fibrocystic change noted.                     Microcalcification noted in benign breast tissue.               H. Previous Biomarkers: Estrogen receptors: positive (95.53%), Progesterone receptors: positive (96.15%),                    HER/2-clayton: negative (0), Ki-67 = 3.34%    TUMOR   Tumor Site  Upper outer quadrant    Histologic Type  Invasive lobular carcinoma    Glandular (Acinar) / Tubular Differentiation  Score 3    Nuclear Pleomorphism  Score 2    Mitotic Rate  Score 1    Overall Grade  Grade 2 (scores of 6 or 7)    Tumor Size  Greatest dimension of largest invasive focus (Millimeters): 20 mm   Additional Dimension (Millimeters)  14 mm     10 mm   Tumor Focality  Single focus of invasive carcinoma    Ductal Carcinoma In Situ (DCIS)  Not identified    Lobular Carcinoma In Situ (LCIS)  No LCIS in specimen    Tumor Extent     Lymphovascular Invasion  Not identified    Dermal Lymphovascular Invasion  No skin present    Microcalcifications  Present in invasive carcinoma      Present in non-neoplastic tissue    Treatment Effect  No known presurgical therapy    MARGINS   Invasive Carcinoma Margins  Uninvolved by invasive carcinoma    Distance from Closest Margin (Millimeters)  5 mm   Closest Margin  Anterior    Distance from Other Margins     Anterior Margin (Millimeters)  5 mm   Posterior Margin (Millimeters)  10 mm   Superior Margin (Millimeters)  20 mm   Inferior Margin (Millimeters)  15 mm   Medial Margin (Millimeters)  8 mm   Lateral Margin (Millimeters)  10 mm   LYMPH NODES   Regional Lymph Nodes  No lymph nodes submitted or found    PATHOLOGIC STAGE CLASSIFICATION (pTNM, AJCC 8th Edition)   Primary Tumor (pT)  pT1c    Regional Lymph Nodes (pN)     Category (pN)  pNX    SPECIAL STUDIES   Breast Biomarker Testing Performed on Previous Biopsy     Estrogen Receptor (ER)  Positive (percentage): 95.5 %   Breast Biomarker Testing Performed on Previous Biopsy     Progesterone Receptor (PgR)  Positive (percentage): 96.2 %   Breast Biomarker Testing Performed on  "Previous Biopsy     HER2 (by immunohistochemistry)  Negative (Score 0)    Testing Performed on Case Number  [id number] (Not reviewed)    Comment(s)   Comment(s)  Ki67= 3.34%      Patient refused endocrine therapy    Currently on radiation          Past Medical History:   Diagnosis Date   • Anxiety    • Arthritis    • Cancer (HCC) 01/2020    left breast   • Cancer of skin of leg, right 2021   • Coronary artery disease     \"BLOCKAGE IN MY LEGS\" stents bilateral legs per pt   • COVID-19 virus infection 09/13/2021   • Hemorrhoids    • History of basal cell cancer    • History of headaches    • Humerus fracture     LEFT    • Hypertension    • Hypothyroidism    • Melanoma (HCC) 2017    LEFT LEG   • PAD (peripheral artery disease) (HCC)    • Skin cancer 2015    left nose   • Skin cancer 2008    right leg   • Vertigo    • Vestibular migraine 2021        Past Surgical History:   Procedure Laterality Date   • BREAST LUMPECTOMY WITH SENTINEL NODE BIOPSY Left 2/5/2020    Procedure: BREAST LUMPECTOMY WITH NEEDLE LOCALIZATION;  Surgeon: Kush Plasencia MD;  Location: [facility];  Service: General;  Laterality: Left;   • BREAST SURGERY Left 2020    BIOPSY   • COLONOSCOPY      ALL OK   • COSMETIC SURGERY      AFTER SKIN EXCISION FROM NOSE   • ENDOSCOPY     • EYE SURGERY Bilateral     CATARACTS   • FRACTURE SURGERY  9/23/21   • LEG SURGERY Left 2017    MELANOMA EXCISION-WITH SKIN GRAFT   • ORIF HUMERUS FRACTURE Left 9/23/2021    Procedure: Open Reduction and Internal Fixation Proximal Humerus;  Surgeon: Sherman Panchal MD;  Location: [facility];  Service: Orthopedics;  Laterality: Left;   • SKIN BIOPSY  2008   • SKIN CANCER EXCISION Bilateral 2015    NOSE, UPPER LIP   • SKIN GRAFT Bilateral    • VASCULAR SURGERY Bilateral 2018    STENTS IN BOTH UPPER LEGS        Current Outpatient Medications on File Prior to Visit   Medication Sig Dispense Refill   • amLODIPine (NORVASC) 5 MG tablet 1/2 in am and 1/2 in pm. (Patient " taking differently: Take 2.5 mg by mouth 2 (two) times a day. 1/2 in am and 1/2 in pm.) 90 tablet 3   • aspirin 81 MG chewable tablet Chew 81 mg Daily.     • calcium carbonate (OS-PAUL) 600 MG tablet Take 600 mg by mouth Daily.     • cholecalciferol (VITAMIN D3) 25 MCG (1000 UT) tablet Take 1,000 Units by mouth Daily.     • levothyroxine (SYNTHROID, LEVOTHROID) 75 MCG tablet One a day 90 tablet 3   • magnesium oxide (MAGOX) 400 (241.3 Mg) MG tablet tablet Take 400 mg by mouth Daily.     • meclizine (ANTIVERT) 12.5 MG tablet Take 12.5 mg by mouth As Needed for Dizziness.     • Riboflavin (VITAMIN B2 PO) Take 1 tablet by mouth Daily.       No current facility-administered medications on file prior to visit.        ALLERGIES:    Allergies   Allergen Reactions   • Codeine Mental Status Change        Social History     Socioeconomic History   • Marital status:      Spouse name: Vladimir   • Number of children: 4   Tobacco Use   • Smoking status: Former Smoker     Packs/day: 0.00     Years: 0.00     Pack years: 0.00     Quit date:      Years since quittin.9   • Smokeless tobacco: Never Used   Vaping Use   • Vaping Use: Never used   Substance and Sexual Activity   • Alcohol use: Never   • Drug use: Never   • Sexual activity: Defer        Family History   Problem Relation Age of Onset   • Heart failure Father    • Skin cancer Father    • Heart disease Father    • Breast cancer Sister 46   • Heart disease Sister    • Esophageal cancer Brother    • Heart disease Brother    • Leukemia Daughter    • Breast cancer Maternal Grandmother         70s   • Colon cancer Maternal Grandmother    • Malig Hyperthermia Neg Hx         Review of Systems   Constitutional: Negative.  Negative for appetite change, chills, diaphoresis, fatigue, fever and unexpected weight change.   HENT: Negative for hearing loss, sore throat and trouble swallowing.    Eyes: Negative.    Respiratory: Positive for shortness of breath. Negative for  "cough, chest tightness and wheezing.    Cardiovascular: Negative.  Negative for chest pain, palpitations and leg swelling.   Gastrointestinal: Negative.  Negative for abdominal distention, abdominal pain, constipation, diarrhea, nausea and vomiting.   Endocrine: Negative.    Genitourinary: Negative.  Negative for dysuria, frequency, hematuria and urgency.   Musculoskeletal: Negative.  Negative for joint swelling.        No muscle weakness.   Skin: Negative.  Negative for rash and wound.   Allergic/Immunologic: Negative.    Neurological: Negative for seizures, syncope, speech difficulty, weakness and numbness. Dizziness: 11/24/20-Unchanged. Headaches: 11/24/20-Unchanged.   Hematological: Negative.  Negative for adenopathy. Does not bruise/bleed easily.   Psychiatric/Behavioral: Negative.  Negative for behavioral problems, confusion and suicidal ideas.   All other systems reviewed and are negative.  Patient has vertigo stable  I have reviewed and confirmed the accuracy of the ROS as documented by the MA/PHANI/MARGOTH Faulkner MD    I have reviewed and confirmed the accuracy of the ROS as documented by the MA/PHANI/MARGOTH Faulkner MD        Objective     Vitals:    12/14/21 1436   BP: 162/70   Pulse: 64   Resp: 16   Temp: 97.3 °F (36.3 °C)   TempSrc: Temporal   SpO2: 98%   Weight: 60.3 kg (133 lb)   Height: 167.6 cm (65.98\")   PainSc:   8   PainLoc: Shoulder  Comment: Left     Current Status 12/14/2021   ECOG score 0     Physical examination      This patient's ACP documentation is up to date, and there's nothing further left to document.      CONSTITUTIONAL:  Vital signs reviewed.  No distress, looks comfortable.  EYES:  Conjunctivae and lids unremarkable.  PERRLA  EARS,NOSE,MOUTH,THROAT:  Ears and nose appear unremarkable.  Lips, teeth, gums appear unremarkable.  BREAST: Right breast: No skin changes, no evidence of breast mass, no nipple discharge, no evidence of any right axillary adenopathy or right " supraclavicular adenopathy  Left breast: No evidence of any skin changes, no evidence of any left breast mass and no evidence of left nipple discharge as well as no left axillary adenopathy or left supraclavicular adenopathy.  RESPIRATORY:  Normal respiratory effort.  Lungs clear to auscultation bilaterally.  CARDIOVASCULAR:  Normal S1, S2.  No murmurs rubs or gallops.  No significant lower extremity edema.  GASTROINTESTINAL: Abdomen appears unremarkable.  Nontender.  No hepatomegaly.  No splenomegaly.  LYMPHATIC:  No cervical, supraclavicular, axillary lymphadenopathy.  SKIN:  Warm.  No rashes.  PSYCHIATRIC:  Normal judgment and insight.  Normal mood and affect.        RECENT LABS:  Hematology WBC   Date Value Ref Range Status   12/14/2021 5.89 3.40 - 10.80 10*3/mm3 Final   10/21/2021 5.4 3.4 - 10.8 x10E3/uL Final   09/17/2018 6.22 4.5 - 11.0 10*3/uL Final     RBC   Date Value Ref Range Status   12/14/2021 4.12 3.77 - 5.28 10*6/mm3 Final   10/21/2021 3.80 3.77 - 5.28 x10E6/uL Final   09/17/2018 4.34 4.0 - 5.2 10*6/uL Final     Hemoglobin   Date Value Ref Range Status   12/14/2021 12.3 12.0 - 15.9 g/dL Final   09/17/2018 13.2 12.0 - 16.0 g/dL Final     Hematocrit   Date Value Ref Range Status   12/14/2021 40.1 34.0 - 46.6 % Final   09/17/2018 41.3 36.0 - 46.0 % Final     Platelets   Date Value Ref Range Status   12/14/2021 388 140 - 450 10*3/mm3 Final   09/17/2018 457 (H) 140 - 440 10*3/uL Final          Assessment/Plan     1. pT1c pNX Invasive Lobular Carcinoma of the Left Breast measuring 20 mm, grade II.  Her tumor was ER+ 96%, MN+ 96%, and HER2/clayton Negative (0) with Ki67 of 3.34%.    -Screening mammogram, 12 mm mass, spiculated margins left breast, 1 o'clock position, 4 cm from the nipple  -Diagnostic mammogram, irregular 15 mm mass left breast , 4 cm from the nipple  -Ultrasound showed irregular mass 15 x 13 x 13 at 12:00, oval mass with indistinct margin 7 x 3 x 5 mm left breast at 1:00  -Left breast  ultrasound-guided biopsy, 12 o'clock position, invasive carcinoma with ductal and lobular features  -left breast C, 1 o'clock position, fibroadenoma  -February 5, 2020, left breast lumpectomy, invasive lobular carcinoma, 20 x 14 x 10 mm, grade 2, [place] score of 5 no lymphovascular space invasion, no evidence of DCIS, margins are all negative, rare focus of atypical lobular hyperplasia, not present at margin,    · I had a lengthy discussion with the patient and family that at the age of 86 years given that this is highly ER ND positive tumor, HER-2 negative, she is not a candidate for any chemotherapy given her age.  I do not see any need to obtain an Oncotype DX in the setting.  She is certainly a candidate for endocrine therapy.  My question would be if sentinel lymph node has to be considered given that she had a 20 mm invasive lobular carcinoma.  However patient is being seen by radiation oncology and will get input from them as well to see if axilla would be included.  Will discuss with Dr. Plasencia.  · Patient to consult radiation oncology for opinion on 03/03/20.  Given patient's cancer was ER/ND+, she will certainly benefit from endocrine therapy.  Given her past medical history and current health, I think Arimidex would be the best choice of treatment.  Patient has refused to undergo any endocrine therapy after explaining the details of the side effects.  She is to follow-up with radiation oncology and so far 17 out of 21 treatments given  Patient has refused endocrine therapy even after discussing in length.  Last mammogram May 2021 which is negative. Currently patient followed with observation.    2. Family history of malignancy.  Her family history consists of her sister who had breast cancer at age 46.  Her maternal grandmother had breast cancer in her 70s.  Her daughter has chronic lymphocytic leukemia.  · Genetic testing could be considered to see if that information benefits the rest of the  family.        3. History of skin cancer in the right leg (2008) and left nose (2015).    4. History of Melanoma of the left leg (2017).  Patient to follow with dermatology    5. Vertigo.  Patient reports feeling dizzy constantly.  Stable  6. Osteopenia: Patient to take calcium and vitamin D supplements.  Discussed either oral bisphosphonates like Actonel or Fosamax or Prolia injectable but patient refuses  · Last bone density March 2020 and she is not due till March 2022. She has osteopenia but refused any bisphosphonates    7. Fatigue since radiation likely related to radiation        PLAN:  1. Patient refused endocrine therapy  2. She is taking calcium and vitamin D but refuses Prolia  3. Discussed Evista and currently wants to hold off any treatment options  4. Follow-up in 6 months with labs  5. We will schedule screening mammogram in May 2022  6. Follow-up with me in 6 months with labs    We will discuss Evista again at her next appointment which can help with osteopenia as well as breast cancer prevention    Sowmya Faulkner MD       Cc:  MD Sima Costa MD Stephen Self, MD Stuart Spalding, MD

## 2022-01-10 ENCOUNTER — OFFICE VISIT (OUTPATIENT)
Dept: ORTHOPEDIC SURGERY | Facility: CLINIC | Age: 87
End: 2022-01-10

## 2022-01-10 VITALS — TEMPERATURE: 97.6 F | BODY MASS INDEX: 20.89 KG/M2 | HEIGHT: 66 IN | WEIGHT: 130 LBS

## 2022-01-10 DIAGNOSIS — Z09 SURGERY FOLLOW-UP: Primary | ICD-10-CM

## 2022-01-10 PROCEDURE — 73030 X-RAY EXAM OF SHOULDER: CPT | Performed by: ORTHOPAEDIC SURGERY

## 2022-01-10 PROCEDURE — 99212 OFFICE O/P EST SF 10 MIN: CPT | Performed by: ORTHOPAEDIC SURGERY

## 2022-01-10 NOTE — PROGRESS NOTES
"Mela Muse : 1933 MRN: [id number] DATE: 1/10/2022    CC: 3 months s/p ORIF left proximal humerus fracture    HPI: Pt. returns to clinic today stating pain is minimal.  Motion is steadily improving.  Denies any new concerns or issues.    Vitals:    01/10/22 1309   Temp: 97.6 °F (36.4 °C)   Weight: 59 kg (130 lb)   Height: 167.6 cm (66\")       Exam:  Incision is healed.  Arm and forearm soft.  Motion is nearly full relative to the other side.  Good motor and sensory function in the hand and wrist.  Palpable radial pulse with good cap refill.      Imaging  2 view xrays including AP, scapular Y views of the shoulder are ordered and reviewed by me to evaluate alignment and for comparison purposes.  No new or concerning findings noted. Fracture appears well-aligned.  Hardware appears in good position.  There has been significant callous formation.    Impression:   3 months s/p ORIF left proximal humerus fracture    Plan:  1.  Continue working on ROM.  2.  No restrictions necessary at this point.  3.  Will release to follow up as needed.    Sherman Panchal MD      01/10/2022   "

## 2022-03-01 ENCOUNTER — HOSPITAL ENCOUNTER (OUTPATIENT)
Dept: CT IMAGING | Facility: HOSPITAL | Age: 87
Discharge: HOME | End: 2022-03-01
Admitting: FAMILY MEDICINE

## 2022-03-01 DIAGNOSIS — R91.8 LEFT UPPER LOBE PULMONARY INFILTRATE: ICD-10-CM

## 2022-03-01 PROCEDURE — 71250 CT THORAX DX C-: CPT

## 2022-03-10 ENCOUNTER — TELEPHONE (OUTPATIENT)
Dept: FAMILY MEDICINE CLINIC | Facility: CLINIC | Age: 87
End: 2022-03-10

## 2022-03-10 ENCOUNTER — OFFICE VISIT (OUTPATIENT)
Dept: FAMILY MEDICINE CLINIC | Facility: CLINIC | Age: 87
End: 2022-03-10

## 2022-03-10 VITALS
OXYGEN SATURATION: 98 % | HEIGHT: 66 IN | SYSTOLIC BLOOD PRESSURE: 110 MMHG | BODY MASS INDEX: 20.73 KG/M2 | HEART RATE: 75 BPM | DIASTOLIC BLOOD PRESSURE: 58 MMHG | WEIGHT: 129 LBS | TEMPERATURE: 96.9 F

## 2022-03-10 DIAGNOSIS — J44.9 CHRONIC OBSTRUCTIVE PULMONARY DISEASE, UNSPECIFIED COPD TYPE: ICD-10-CM

## 2022-03-10 DIAGNOSIS — E03.9 ACQUIRED HYPOTHYROIDISM: ICD-10-CM

## 2022-03-10 DIAGNOSIS — I10 ESSENTIAL HYPERTENSION: Primary | ICD-10-CM

## 2022-03-10 PROCEDURE — 99214 OFFICE O/P EST MOD 30 MIN: CPT | Performed by: FAMILY MEDICINE

## 2022-03-10 RX ORDER — AMLODIPINE BESYLATE 5 MG/1
TABLET ORAL
Qty: 45 TABLET | Refills: 3 | Status: SHIPPED | OUTPATIENT
Start: 2022-03-10 | End: 2022-07-14 | Stop reason: SDUPTHER

## 2022-03-10 RX ORDER — LEVOTHYROXINE SODIUM 0.07 MG/1
TABLET ORAL
Qty: 90 TABLET | Refills: 3 | Status: SHIPPED | OUTPATIENT
Start: 2022-03-10 | End: 2023-03-27 | Stop reason: SDUPTHER

## 2022-03-10 RX ORDER — TIOTROPIUM BROMIDE AND OLODATEROL 3.124; 2.736 UG/1; UG/1
2 SPRAY, METERED RESPIRATORY (INHALATION)
Start: 2022-03-10 | End: 2022-06-14

## 2022-03-10 NOTE — PROGRESS NOTES
Chief Complaint   Patient presents with   • Hypertension       Subjective   Mela Muse is a 89 y.o. female.     History of Present Illness   F/U hypothyroidism.   On levothyroxine daily.    F/U HTN.  Doing well with amlodipine 5mg 1/2 BID.    F/U COPD.  PFTs showed obstruction on pulm referral.  Given rx for breo but could not afford.    The following portions of the patient's history were reviewed and updated as appropriate: allergies, current medications, past family history, past medical history, past social history, past surgical history and problem list.    Review of Systems   Constitutional: Negative for appetite change and fatigue.   HENT: Negative for nosebleeds and sore throat.    Eyes: Negative for blurred vision and visual disturbance.   Respiratory: Negative for shortness of breath and wheezing.    Cardiovascular: Negative for chest pain and leg swelling.   Gastrointestinal: Negative for abdominal distention and abdominal pain.   Endocrine: Negative for cold intolerance and polyuria.   Genitourinary: Negative for dysuria and hematuria.   Musculoskeletal: Negative for arthralgias and myalgias.   Skin: Negative for color change and rash.   Neurological: Negative for weakness and confusion.   Psychiatric/Behavioral: Negative for agitation and depressed mood.       Patient Active Problem List   Diagnosis   • Malignant neoplasm of breast in female, estrogen receptor positive (HCC)   • Vertigo   • History of skin cancer   • History of melanoma   • Osteoporosis   • Post-menopausal   • Dizziness   • Essential hypertension   • PAD (peripheral artery disease) ([facility])   • Claudication of both lower extremities ([facility])   • Asymptomatic bilateral carotid artery stenosis   • Osteopenia of hip   • Other fatigue   • Medicare annual wellness visit, subsequent   • Acquired hypothyroidism   • Left-sided chest pain   • COVID-19   • Closed fracture of left proximal humerus   • Shortness of breath   • Left upper lobe  "pulmonary infiltrate   • COPD (chronic obstructive pulmonary disease) ([facility])       Allergies   Allergen Reactions   • Codeine Mental Status Change         Current Outpatient Medications:   •  amLODIPine (NORVASC) 5 MG tablet, 1/2 in am., Disp: 45 tablet, Rfl: 3  •  aspirin 81 MG chewable tablet, Chew 81 mg Daily., Disp: , Rfl:   •  calcium carbonate (OS-PAUL) 600 MG tablet, Take 600 mg by mouth Daily., Disp: , Rfl:   •  cholecalciferol (VITAMIN D3) 25 MCG (1000 UT) tablet, Take 1,000 Units by mouth Daily., Disp: , Rfl:   •  levothyroxine (SYNTHROID, LEVOTHROID) 75 MCG tablet, One a day, Disp: 90 tablet, Rfl: 3  •  magnesium oxide (MAGOX) 400 (241.3 Mg) MG tablet tablet, Take 400 mg by mouth Daily., Disp: , Rfl:   •  meclizine (ANTIVERT) 12.5 MG tablet, Take 12.5 mg by mouth As Needed for Dizziness., Disp: , Rfl:   •  Riboflavin (VITAMIN B2 PO), Take 1 tablet by mouth Daily., Disp: , Rfl:     Past Medical History:   Diagnosis Date   • Anxiety    • Arthritis    • Cancer (HCC) 01/2020    left breast   • Cancer of skin of leg, right 2021   • Coronary artery disease     \"BLOCKAGE IN MY LEGS\" stents bilateral legs per pt   • COVID-19 virus infection 09/13/2021   • Hemorrhoids    • History of basal cell cancer    • History of headaches    • Humerus fracture     LEFT    • Hypertension    • Hypothyroidism    • Melanoma (HCC) 2017    LEFT LEG   • PAD (peripheral artery disease) (HCC)    • Skin cancer 2015    left nose   • Skin cancer 2008    right leg   • Vertigo    • Vestibular migraine 2021       Past Surgical History:   Procedure Laterality Date   • BREAST LUMPECTOMY WITH SENTINEL NODE BIOPSY Left 2/5/2020    Procedure: BREAST LUMPECTOMY WITH NEEDLE LOCALIZATION;  Surgeon: Kush Plasencia MD;  Location: [facility] OR;  Service: General;  Laterality: Left;   • BREAST SURGERY Left 2020    BIOPSY   • COLONOSCOPY      ALL OK   • COSMETIC SURGERY      AFTER SKIN EXCISION FROM NOSE   • ENDOSCOPY     • EYE SURGERY Bilateral  "    CATARACTS   • FRACTURE SURGERY  21   • LEG SURGERY Left 2017    MELANOMA EXCISION-WITH SKIN GRAFT   • ORIF HUMERUS FRACTURE Left 2021    Procedure: Open Reduction and Internal Fixation Proximal Humerus;  Surgeon: Sherman Panchal MD;  Location: [facility];  Service: Orthopedics;  Laterality: Left;   • SKIN BIOPSY     • SKIN CANCER EXCISION Bilateral     NOSE, UPPER LIP   • SKIN GRAFT Bilateral    • VASCULAR SURGERY Bilateral 2018    STENTS IN BOTH UPPER LEGS       Family History   Problem Relation Age of Onset   • Heart failure Father    • Skin cancer Father    • Heart disease Father    • Breast cancer Sister 46   • Heart disease Sister    • Esophageal cancer Brother    • Heart disease Brother    • Leukemia Daughter    • Breast cancer Maternal Grandmother         70s   • Colon cancer Maternal Grandmother    • Malig Hyperthermia Neg Hx        Social History     Tobacco Use   • Smoking status: Former Smoker     Packs/day: 0.00     Years: 0.00     Pack years: 0.00     Quit date: 1970     Years since quittin.2   • Smokeless tobacco: Never Used   Substance Use Topics   • Alcohol use: Never            Objective     Vitals:    03/10/22 1103   BP: 110/58   Pulse: 75   Temp: 96.9 °F (36.1 °C)   SpO2: 98%     Body mass index is 20.82 kg/m².    Physical Exam  Vitals reviewed.   Constitutional:       Appearance: She is well-developed. She is not diaphoretic.   HENT:      Head: Normocephalic and atraumatic.   Eyes:      General: No scleral icterus.     Pupils: Pupils are equal, round, and reactive to light.   Neck:      Thyroid: No thyromegaly.   Cardiovascular:      Rate and Rhythm: Normal rate and regular rhythm.      Heart sounds: No murmur heard.    No friction rub. No gallop.   Pulmonary:      Effort: Pulmonary effort is normal. No respiratory distress.      Breath sounds: No wheezing or rales.   Chest:      Chest wall: No tenderness.   Abdominal:      General: Bowel sounds are normal.  There is no distension.      Palpations: Abdomen is soft.      Tenderness: There is no abdominal tenderness.   Musculoskeletal:         General: No deformity. Normal range of motion.   Lymphadenopathy:      Cervical: No cervical adenopathy.   Skin:     General: Skin is warm and dry.      Findings: No rash.   Neurological:      Cranial Nerves: No cranial nerve deficit.      Motor: No abnormal muscle tone.         Lab Results   Component Value Date    GLUCOSE 116 12/14/2021    BUN 19 12/14/2021    CREATININE 1.03 12/14/2021    EGFRIFNONA 51 (L) 12/14/2021    EGFRIFAFRI 63 10/21/2021    BCR 18.4 12/14/2021    K 4.4 12/14/2021    CO2 27.4 12/14/2021    CALCIUM 9.6 12/14/2021    PROTENTOTREF 6.3 10/21/2021    ALBUMIN 3.90 12/14/2021    LABIL2 1.5 10/21/2021    AST 25 12/14/2021    ALT 19 12/14/2021       WBC   Date Value Ref Range Status   12/14/2021 5.89 3.40 - 10.80 10*3/mm3 Final   10/21/2021 5.4 3.4 - 10.8 x10E3/uL Final   09/17/2018 6.22 4.5 - 11.0 10*3/uL Final     RBC   Date Value Ref Range Status   12/14/2021 4.12 3.77 - 5.28 10*6/mm3 Final   10/21/2021 3.80 3.77 - 5.28 x10E6/uL Final   09/17/2018 4.34 4.0 - 5.2 10*6/uL Final     Hemoglobin   Date Value Ref Range Status   12/14/2021 12.3 12.0 - 15.9 g/dL Final   09/17/2018 13.2 12.0 - 16.0 g/dL Final     Hematocrit   Date Value Ref Range Status   12/14/2021 40.1 34.0 - 46.6 % Final   09/17/2018 41.3 36.0 - 46.0 % Final     MCV   Date Value Ref Range Status   12/14/2021 97.3 (H) 79.0 - 97.0 fL Final   09/17/2018 95.2 80.0 - 100.0 fL Final     MCH   Date Value Ref Range Status   12/14/2021 29.9 26.6 - 33.0 pg Final   09/17/2018 30.4 26.0 - 34.0 pg Final     MCHC   Date Value Ref Range Status   12/14/2021 30.7 (L) 31.5 - 35.7 g/dL Final   09/17/2018 32.0 31.0 - 37.0 g/dL Final     RDW   Date Value Ref Range Status   12/14/2021 13.2 12.3 - 15.4 % Final   09/17/2018 13.7 12.0 - 16.8 % Final     RDW-SD   Date Value Ref Range Status   12/14/2021 47.6 37.0 - 54.0 fl  Final     MPV   Date Value Ref Range Status   12/14/2021 9.0 6.0 - 12.0 fL Final   09/17/2018 9.6 6.7 - 10.8 fL Final     Platelets   Date Value Ref Range Status   12/14/2021 388 140 - 450 10*3/mm3 Final   09/17/2018 457 (H) 140 - 440 10*3/uL Final     Neutrophil Rel %   Date Value Ref Range Status   09/17/2018 62.9 45 - 80 % Final     Neutrophil %   Date Value Ref Range Status   12/14/2021 55.7 42.7 - 76.0 % Final     Lymphocyte Rel %   Date Value Ref Range Status   09/17/2018 23.0 15 - 50 % Final     Lymphocyte %   Date Value Ref Range Status   12/14/2021 30.4 19.6 - 45.3 % Final     Monocyte Rel %   Date Value Ref Range Status   09/17/2018 8.7 0 - 15 % Final     Monocyte %   Date Value Ref Range Status   12/14/2021 7.8 5.0 - 12.0 % Final     Eosinophil %   Date Value Ref Range Status   12/14/2021 4.2 0.3 - 6.2 % Final   09/17/2018 3.4 0 - 7 % Final     Basophil Rel %   Date Value Ref Range Status   09/17/2018 1.8 0 - 2 % Final     Basophil %   Date Value Ref Range Status   12/14/2021 1.7 (H) 0.0 - 1.5 % Final     Immature Grans %   Date Value Ref Range Status   12/14/2021 0.2 0.0 - 0.5 % Final   09/17/2018 0.2 (H) 0 % Final     Neutrophils Absolute   Date Value Ref Range Status   09/17/2018 3.92 2.0 - 8.8 10*3/uL Final     Neutrophils, Absolute   Date Value Ref Range Status   12/14/2021 3.28 1.70 - 7.00 10*3/mm3 Final     Lymphocytes Absolute   Date Value Ref Range Status   09/17/2018 1.43 0.7 - 5.5 10*3/uL Final     Lymphocytes, Absolute   Date Value Ref Range Status   12/14/2021 1.79 0.70 - 3.10 10*3/mm3 Final     Monocytes Absolute   Date Value Ref Range Status   09/17/2018 0.54 0.0 - 1.7 10*3/uL Final     Monocytes, Absolute   Date Value Ref Range Status   12/14/2021 0.46 0.10 - 0.90 10*3/mm3 Final     Eosinophils Absolute   Date Value Ref Range Status   09/17/2018 0.21 0.0 - 0.8 10*3/uL Final     Eosinophils, Absolute   Date Value Ref Range Status   12/14/2021 0.25 0.00 - 0.40 10*3/mm3 Final     Basophils  Absolute   Date Value Ref Range Status   09/17/2018 0.11 0.0 - 0.2 10*3/uL Final     Basophils, Absolute   Date Value Ref Range Status   12/14/2021 0.10 0.00 - 0.20 10*3/mm3 Final     Immature Grans, Absolute   Date Value Ref Range Status   12/14/2021 0.01 0.00 - 0.05 10*3/mm3 Final   09/17/2018 0.01 <1 10*3/uL Final     nRBC   Date Value Ref Range Status   12/14/2021 0.0 0.0 - 0.2 /100 WBC Final       No results found for: HGBA1C    Lab Results   Component Value Date    PPAVXEPC05 479 10/21/2021       TSH   Date Value Ref Range Status   10/21/2021 11.200 (H) 0.450 - 4.500 uIU/mL Final       No results found for: CHOL  No results found for: TRIG  No results found for: HDL  No results found for: LDL  No results found for: VLDL  No results found for: LDLHDL      Procedures    Assessment/Plan   Problems Addressed this Visit     Acquired hypothyroidism    Relevant Medications    levothyroxine (SYNTHROID, LEVOTHROID) 75 MCG tablet    COPD (chronic obstructive pulmonary disease) (HCC)    Essential hypertension - Primary    Relevant Medications    amLODIPine (NORVASC) 5 MG tablet      Diagnoses       Codes Comments    Essential hypertension    -  Primary ICD-10-CM: I10  ICD-9-CM: 401.9     Acquired hypothyroidism     ICD-10-CM: E03.9  ICD-9-CM: 244.9     Chronic obstructive pulmonary disease, unspecified COPD type (HCC)     ICD-10-CM: J44.9  ICD-9-CM: 496         HNT.  Low diastolic noted.  Decrease amlo to 2.5 a day.    Hypothyroidism.  Check TSh.  RF levothyroxine 75 a day.  COPD.  New dx.  stiolto samples given and demonstrated.   No orders of the defined types were placed in this encounter.      Current Outpatient Medications   Medication Sig Dispense Refill   • amLODIPine (NORVASC) 5 MG tablet 1/2 in am. 45 tablet 3   • aspirin 81 MG chewable tablet Chew 81 mg Daily.     • calcium carbonate (OS-PAUL) 600 MG tablet Take 600 mg by mouth Daily.     • cholecalciferol (VITAMIN D3) 25 MCG (1000 UT) tablet Take 1,000 Units by  mouth Daily.     • levothyroxine (SYNTHROID, LEVOTHROID) 75 MCG tablet One a day 90 tablet 3   • magnesium oxide (MAGOX) 400 (241.3 Mg) MG tablet tablet Take 400 mg by mouth Daily.     • meclizine (ANTIVERT) 12.5 MG tablet Take 12.5 mg by mouth As Needed for Dizziness.     • Riboflavin (VITAMIN B2 PO) Take 1 tablet by mouth Daily.       No current facility-administered medications for this visit.       Mela Muse had no medications administered during this visit.    Return in about 4 months (around 7/10/2022).    There are no Patient Instructions on file for this visit.

## 2022-03-11 LAB
BUN SERPL-MCNC: 25 MG/DL (ref 8–27)
BUN/CREAT SERPL: 26 (ref 12–28)
CALCIUM SERPL-MCNC: 9.9 MG/DL (ref 8.7–10.3)
CHLORIDE SERPL-SCNC: 106 MMOL/L (ref 96–106)
CO2 SERPL-SCNC: 23 MMOL/L (ref 20–29)
CREAT SERPL-MCNC: 0.98 MG/DL (ref 0.57–1)
EGFR GENE MUT ANL BLD/T: 55 ML/MIN/1.73
GLUCOSE SERPL-MCNC: 85 MG/DL (ref 65–99)
POTASSIUM SERPL-SCNC: 4.9 MMOL/L (ref 3.5–5.2)
SODIUM SERPL-SCNC: 143 MMOL/L (ref 134–144)
TSH SERPL DL<=0.005 MIU/L-ACNC: 4.23 UIU/ML (ref 0.45–4.5)

## 2022-04-22 ENCOUNTER — TELEPHONE (OUTPATIENT)
Dept: ONCOLOGY | Facility: CLINIC | Age: 87
End: 2022-04-22

## 2022-04-22 NOTE — TELEPHONE ENCOUNTER
Call to Nai to let her know the letter has been prepared as requested.  Let her know that it would be in her MY Chart or, it can be mailed.  She requests it to be mailed.

## 2022-04-22 NOTE — TELEPHONE ENCOUNTER
----- Message from Sofia Tay RN sent at 4/21/2022 12:25 PM EDT -----  Regarding: FW: Letter of Need    ----- Message -----  From: Mela Muse  Sent: 4/21/2022  12:24 PM EDT  To: [facility] Clinical Pool  Subject: Letter of Need                                   Hi Dr. Faulkner,  I am writing on behalf of my mother, Mela Muse. I am helping her with some health care reimbursement paperwork. In order for her to be reimbursed for the Calcium Chews you recommended she have daily she requires a letter of necessity from her doctor.  Thank you for your assistance.  Nai [place], daughter of Mela Muse

## 2022-05-31 ENCOUNTER — HOSPITAL ENCOUNTER (OUTPATIENT)
Dept: BONE DENSITY | Facility: HOSPITAL | Age: 87
Discharge: HOME | End: 2022-05-31
Admitting: INTERNAL MEDICINE

## 2022-05-31 DIAGNOSIS — Z17.0 MALIGNANT NEOPLASM OF BREAST IN FEMALE, ESTROGEN RECEPTOR POSITIVE, UNSPECIFIED LATERALITY, UNSPECIFIED SITE OF BREAST: ICD-10-CM

## 2022-05-31 DIAGNOSIS — Z78.0 POST-MENOPAUSAL: ICD-10-CM

## 2022-05-31 DIAGNOSIS — C50.919 MALIGNANT NEOPLASM OF BREAST IN FEMALE, ESTROGEN RECEPTOR POSITIVE, UNSPECIFIED LATERALITY, UNSPECIFIED SITE OF BREAST: ICD-10-CM

## 2022-05-31 PROCEDURE — 77080 DXA BONE DENSITY AXIAL: CPT

## 2022-06-14 ENCOUNTER — OFFICE VISIT (OUTPATIENT)
Dept: ONCOLOGY | Facility: CLINIC | Age: 87
End: 2022-06-14

## 2022-06-14 ENCOUNTER — LAB (OUTPATIENT)
Dept: LAB | Facility: HOSPITAL | Age: 87
End: 2022-06-14

## 2022-06-14 VITALS
DIASTOLIC BLOOD PRESSURE: 74 MMHG | RESPIRATION RATE: 16 BRPM | BODY MASS INDEX: 20.88 KG/M2 | TEMPERATURE: 97.1 F | WEIGHT: 129.9 LBS | OXYGEN SATURATION: 99 % | SYSTOLIC BLOOD PRESSURE: 158 MMHG | HEART RATE: 63 BPM | HEIGHT: 66 IN

## 2022-06-14 DIAGNOSIS — C50.919 MALIGNANT NEOPLASM OF BREAST IN FEMALE, ESTROGEN RECEPTOR POSITIVE, UNSPECIFIED LATERALITY, UNSPECIFIED SITE OF BREAST: Primary | ICD-10-CM

## 2022-06-14 DIAGNOSIS — C50.919 MALIGNANT NEOPLASM OF BREAST IN FEMALE, ESTROGEN RECEPTOR POSITIVE, UNSPECIFIED LATERALITY, UNSPECIFIED SITE OF BREAST: ICD-10-CM

## 2022-06-14 DIAGNOSIS — Z17.0 MALIGNANT NEOPLASM OF BREAST IN FEMALE, ESTROGEN RECEPTOR POSITIVE, UNSPECIFIED LATERALITY, UNSPECIFIED SITE OF BREAST: ICD-10-CM

## 2022-06-14 DIAGNOSIS — Z12.31 ENCOUNTER FOR SCREENING MAMMOGRAM FOR MALIGNANT NEOPLASM OF BREAST: ICD-10-CM

## 2022-06-14 DIAGNOSIS — Z17.0 MALIGNANT NEOPLASM OF BREAST IN FEMALE, ESTROGEN RECEPTOR POSITIVE, UNSPECIFIED LATERALITY, UNSPECIFIED SITE OF BREAST: Primary | ICD-10-CM

## 2022-06-14 LAB
ALBUMIN SERPL-MCNC: 4.1 G/DL (ref 3.5–5.2)
ALBUMIN/GLOB SERPL: 1.2 G/DL (ref 1.1–2.4)
ALP SERPL-CCNC: 61 U/L (ref 38–116)
ALT SERPL W P-5'-P-CCNC: 9 U/L (ref 0–33)
ANION GAP SERPL CALCULATED.3IONS-SCNC: 12.6 MMOL/L (ref 5–15)
AST SERPL-CCNC: 17 U/L (ref 0–32)
BASOPHILS # BLD AUTO: 0.08 10*3/MM3 (ref 0–0.2)
BASOPHILS NFR BLD AUTO: 1.3 % (ref 0–1.5)
BILIRUB SERPL-MCNC: 0.4 MG/DL (ref 0.2–1.2)
BUN SERPL-MCNC: 24 MG/DL (ref 6–20)
BUN/CREAT SERPL: 22.4 (ref 7.3–30)
CALCIUM SPEC-SCNC: 9.9 MG/DL (ref 8.5–10.2)
CHLORIDE SERPL-SCNC: 105 MMOL/L (ref 98–107)
CO2 SERPL-SCNC: 24.4 MMOL/L (ref 22–29)
CREAT SERPL-MCNC: 1.07 MG/DL (ref 0.6–1.1)
DEPRECATED RDW RBC AUTO: 46.4 FL (ref 37–54)
EGFRCR SERPLBLD CKD-EPI 2021: 49.8 ML/MIN/1.73
EOSINOPHIL # BLD AUTO: 0.17 10*3/MM3 (ref 0–0.4)
EOSINOPHIL NFR BLD AUTO: 2.8 % (ref 0.3–6.2)
ERYTHROCYTE [DISTWIDTH] IN BLOOD BY AUTOMATED COUNT: 13.3 % (ref 12.3–15.4)
GLOBULIN UR ELPH-MCNC: 3.4 GM/DL (ref 1.8–3.5)
GLUCOSE SERPL-MCNC: 106 MG/DL (ref 74–124)
HCT VFR BLD AUTO: 40.4 % (ref 34–46.6)
HGB BLD-MCNC: 12.8 G/DL (ref 12–15.9)
IMM GRANULOCYTES # BLD AUTO: 0.01 10*3/MM3 (ref 0–0.05)
IMM GRANULOCYTES NFR BLD AUTO: 0.2 % (ref 0–0.5)
LYMPHOCYTES # BLD AUTO: 1.54 10*3/MM3 (ref 0.7–3.1)
LYMPHOCYTES NFR BLD AUTO: 25.5 % (ref 19.6–45.3)
MCH RBC QN AUTO: 30.2 PG (ref 26.6–33)
MCHC RBC AUTO-ENTMCNC: 31.7 G/DL (ref 31.5–35.7)
MCV RBC AUTO: 95.3 FL (ref 79–97)
MONOCYTES # BLD AUTO: 0.42 10*3/MM3 (ref 0.1–0.9)
MONOCYTES NFR BLD AUTO: 7 % (ref 5–12)
NEUTROPHILS NFR BLD AUTO: 3.82 10*3/MM3 (ref 1.7–7)
NEUTROPHILS NFR BLD AUTO: 63.2 % (ref 42.7–76)
NRBC BLD AUTO-RTO: 0 /100 WBC (ref 0–0.2)
PLATELET # BLD AUTO: 262 10*3/MM3 (ref 140–450)
PMV BLD AUTO: 9.4 FL (ref 6–12)
POTASSIUM SERPL-SCNC: 4.3 MMOL/L (ref 3.5–4.7)
PROT SERPL-MCNC: 7.5 G/DL (ref 6.3–8)
RBC # BLD AUTO: 4.24 10*6/MM3 (ref 3.77–5.28)
SODIUM SERPL-SCNC: 142 MMOL/L (ref 134–145)
WBC NRBC COR # BLD: 6.04 10*3/MM3 (ref 3.4–10.8)

## 2022-06-14 PROCEDURE — 85025 COMPLETE CBC W/AUTO DIFF WBC: CPT

## 2022-06-14 PROCEDURE — 36415 COLL VENOUS BLD VENIPUNCTURE: CPT

## 2022-06-14 PROCEDURE — 99213 OFFICE O/P EST LOW 20 MIN: CPT | Performed by: INTERNAL MEDICINE

## 2022-06-14 PROCEDURE — 80053 COMPREHEN METABOLIC PANEL: CPT

## 2022-06-14 RX ORDER — FLUTICASONE PROPIONATE 50 MCG
SPRAY, SUSPENSION (ML) NASAL
COMMUNITY
Start: 2022-05-20 | End: 2022-12-13

## 2022-06-14 NOTE — PROGRESS NOTES
Subjective       REASON FOR FOLLOW UP:    1. pT1c pNX Invasive Lobular Carcinoma of the Left Breast measuring 20 mm, grade II.  Her tumor was ER+ 96%, SC+ 96%, and HER2/clayton Negative (0) with Ki67 of 3.34%.      2.  Patient was to start Arimidex February 2020 but has not started.  On further discussion she wants to avoid any medications including tamoxifen.    3.  Currently receiving radiation     4.  Osteopenia, had a bone density which I have reviewed with the patient.                               HISTORY OF PRESENT ILLNESS:        Patient is a 88-year-old female with history of T1c NX invasive lobular carcinoma of the left breast, 20 mm, ER SC positive HER-2/clayton negative who was to start Arimidex . Patient had refused any endocrine therapy and currently is followed with observation. She does do her self breast exam monthly. Her last screening mammogram was May 2021 which was negative. She did fall and hurt her arm and had to undergo surgery in September 2021. So she still has pain and has to undergo physical therapy.    Patient had CT chest ordered by her primary care physician because of shortness of breath which showed there was some small airspace consolidation in the right apex which was not present in 2006 but age indeterminate. It could be acute or subacute infectious infiltrate but a conservative surveillance with repeat CT suggested in 3 months. The parenchymal and pleural thickening in the anterior aspect of the left upper lobe was likely secondary to left breast radiation. Patient has follow-up appointment with pulmonary physician in the next couple weeks.    Interval history:    Patient is doing well without any complaints.  She still very active and cooks her own meals.  But she had COVID last year and since then she thinks she has the long-haul COVID as she feels very fatigued.  She is not anemic.  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   Patient has chronic shortness of breath mild but unchanged.  She follows up with pulmonary.    She has not lost recent weight.  Her appetite though is less.        Oncologic history:  The patient is a 89 y.o. year old female who is here for an opinion about the above issue.    She is currently followed by PCP Dr. Yuniel Bonilla.  Patient had never had mammograms since 2013.  In October 2019 she noted indentation of the skin in the breast.  She did not have pain but she did feel a marble size lump at the time.  She had a screening mammogram in November 2019 which had revealed a 12 mm mass in the middle one-third region of the left breast at 1:00 position 4 cmfn.  She, then, went for a diagnostic mammogram and ultrasound in December.  The mammogram revealed an irregular 15 mm mass in the left breast at the 12:00 position and the ultrasound revealed an irregular [id number] mm mass in the left breast at the 12:00 position 4 cmfn and an oval elongated mass measuring 7x3x5 mm in the left breast at the 1:00 position.  She was referred to Dr. Plasencia who performed an US-guided biopsy which showed Invasive Carcinoma with Ductal and Lobular features, low grade, measuring 15 mm, ER+ 96%, MI+ 96%, HER2/clayton negative (0), Ki67 3%.  The mass at the 1:00 position was a fibroadenoma.  She underwent a left breast lumpectomy on February 5, 2020 which confirmed Invasive Lobular Carcinoma of the Left Breast measuring 20 mm, grade II.  Her tumor was ER+ 96%, MI+ 96%, and HER2/clayton Negative (0).    Her personal history consists of vertigo, skin cancer of right leg in 2008 and of left nose in 2015.  She had a melanoma in 2017.    Her family history consists of her sister who had breast cancer at age 46.  Her maternal grandmother had breast cancer in her 70s.  Her daughter has chronic lymphocytic leukemia.        Details of the diagnostics scans and procedures are as follows:    11/27/19 - Screening Mammogram  Finding 1: There is a new  irregular mass measuring 12 mm with spiculated margins seen inthe middle one-third region of the left beast at the 1:00 position located 4 cmfn.    Finding 2: There are vascular calcifications seen in both breasts.  No suspicious masses in the right breast.    12/26/19 - Diagnostic Mammogram  Finding 1: There is an irregular mass measuring 15 mm with spiculated margins in the upper outer region of the left breast 4 cmfn.      Ultrasound:  Finding 1: Irregular hypoechoic mass measuring [id number] mm at 12:00 4cmfn.    Finding 2: There is an oval elongated mass with indistinct margins measuring 7x3x5 mm in the left breast at 1:00, 1.7 cm from the mass at 12:00.    01/09/20 - US-Guided Biopsy  Left Breast 12:00 Position   Invasive Carcinoma with Ductal and Lobular features, low grade, measuring 15 mm, ER+ 96%, MN+ 96%, HER2/clayton negative (0), Ki67 3%    Left Breast 1:00 Position  Fibroadenoma    02/05/20 - Left Breast Lumpectomy   A. Invasive lobular carcinoma:                            1. Invasive carcinoma measures 20 mm x  14 mm x 10 mm.                            2. Overall Bekah grade II (tubular score = 3, nuclear score = 2,                                mitotic score = 1).                            3. No lymphovascular invasion identified.                            4. Microcalcifications are present in the invasive component.               B. No carcinoma in situ component identified.               C. All margins are negative for invasive carcinoma.                    Carcinoma measures 2.0 mm from the closest (Medial) margin of excision.                     All other margins measure at least 4.0 mm from invasive carcinoma including:                            Anterior margin = 5 mm                            Posterior margin = 10 mm                            Superior margin = 20 mm                            Inferior margin =15mm                             Lateral margin = 4 mm                             Medial margin = 2 mm                  D. Rare focus of atypical lobular hyperplasia (ALH) noted, Not present at the margin. Focal fibroadenoma                    involved by invasive lobular carcinoma noted, Not present at the margin.               E. Focal biopsy site changes are identified.               F. Focal atypical ductal hyperplasia noted, not present at the margin.               G. Non-neoplastic breast tissue with intraductal papilloma, adenosis and fibrocystic change noted.                    Microcalcification noted in benign breast tissue.               H. Previous Biomarkers: Estrogen receptors: positive (95.53%), Progesterone receptors: positive (96.15%),                    HER/2-clayton: negative (0), Ki-67 = 3.34%    TUMOR   Tumor Site  Upper outer quadrant    Histologic Type  Invasive lobular carcinoma    Glandular (Acinar) / Tubular Differentiation  Score 3    Nuclear Pleomorphism  Score 2    Mitotic Rate  Score 1    Overall Grade  Grade 2 (scores of 6 or 7)    Tumor Size  Greatest dimension of largest invasive focus (Millimeters): 20 mm   Additional Dimension (Millimeters)  14 mm     10 mm   Tumor Focality  Single focus of invasive carcinoma    Ductal Carcinoma In Situ (DCIS)  Not identified    Lobular Carcinoma In Situ (LCIS)  No LCIS in specimen    Tumor Extent     Lymphovascular Invasion  Not identified    Dermal Lymphovascular Invasion  No skin present    Microcalcifications  Present in invasive carcinoma      Present in non-neoplastic tissue    Treatment Effect  No known presurgical therapy    MARGINS   Invasive Carcinoma Margins  Uninvolved by invasive carcinoma    Distance from Closest Margin (Millimeters)  5 mm   Closest Margin  Anterior    Distance from Other Margins     Anterior Margin (Millimeters)  5 mm   Posterior Margin (Millimeters)  10 mm   Superior Margin (Millimeters)  20 mm   Inferior Margin (Millimeters)  15 mm   Medial Margin (Millimeters)  8 mm   Lateral Margin (Millimeters)   "10 mm   LYMPH NODES   Regional Lymph Nodes  No lymph nodes submitted or found    PATHOLOGIC STAGE CLASSIFICATION (pTNM, AJCC 8th Edition)   Primary Tumor (pT)  pT1c    Regional Lymph Nodes (pN)     Category (pN)  pNX    SPECIAL STUDIES   Breast Biomarker Testing Performed on Previous Biopsy     Estrogen Receptor (ER)  Positive (percentage): 95.5 %   Breast Biomarker Testing Performed on Previous Biopsy     Progesterone Receptor (PgR)  Positive (percentage): 96.2 %   Breast Biomarker Testing Performed on Previous Biopsy     HER2 (by immunohistochemistry)  Negative (Score 0)    Testing Performed on Case Number  [id number] (Not reviewed)    Comment(s)   Comment(s)  Ki67= 3.34%      Patient refused endocrine therapy    Currently on radiation          Past Medical History:   Diagnosis Date   • Anxiety    • Arthritis    • Cancer (HCC) 01/2020    left breast   • Cancer of skin of leg, right 2021   • Coronary artery disease     \"BLOCKAGE IN MY LEGS\" stents bilateral legs per pt   • COVID-19 virus infection 09/13/2021   • Hemorrhoids    • History of basal cell cancer    • History of headaches    • Humerus fracture     LEFT    • Hypertension    • Hypothyroidism    • Melanoma (HCC) 2017    LEFT LEG   • PAD (peripheral artery disease) (HCC)    • Skin cancer 2015    left nose   • Skin cancer 2008    right leg   • Vertigo    • Vestibular migraine 2021        Past Surgical History:   Procedure Laterality Date   • BREAST LUMPECTOMY WITH SENTINEL NODE BIOPSY Left 2/5/2020    Procedure: BREAST LUMPECTOMY WITH NEEDLE LOCALIZATION;  Surgeon: Kush Plasencia MD;  Location: [facility];  Service: General;  Laterality: Left;   • BREAST SURGERY Left 2020    BIOPSY   • COLONOSCOPY      ALL OK   • COSMETIC SURGERY      AFTER SKIN EXCISION FROM NOSE   • ENDOSCOPY     • EYE SURGERY Bilateral     CATARACTS   • FRACTURE SURGERY  9/23/21   • LEG SURGERY Left 2017    MELANOMA EXCISION-WITH SKIN GRAFT   • ORIF HUMERUS FRACTURE Left 9/23/2021 "    Procedure: Open Reduction and Internal Fixation Proximal Humerus;  Surgeon: Sherman Panchal MD;  Location: [facility] OR;  Service: Orthopedics;  Laterality: Left;   • SKIN BIOPSY     • SKIN CANCER EXCISION Bilateral     NOSE, UPPER LIP   • SKIN GRAFT Bilateral    • VASCULAR SURGERY Bilateral 2018    STENTS IN BOTH UPPER LEGS        Current Outpatient Medications on File Prior to Visit   Medication Sig Dispense Refill   • amLODIPine (NORVASC) 5 MG tablet 1/2 in am. 45 tablet 3   • aspirin 81 MG chewable tablet Chew 81 mg Daily.     • calcium carbonate (OS-PAUL) 600 MG tablet Take 600 mg by mouth Daily.     • cholecalciferol (VITAMIN D3) 25 MCG (1000 UT) tablet Take 1,000 Units by mouth Daily.     • fluticasone (FLONASE) 50 MCG/ACT nasal spray      • levothyroxine (SYNTHROID, LEVOTHROID) 75 MCG tablet One a day 90 tablet 3   • magnesium oxide (MAGOX) 400 (241.3 Mg) MG tablet tablet Take 400 mg by mouth Daily.     • meclizine (ANTIVERT) 12.5 MG tablet Take 12.5 mg by mouth As Needed for Dizziness.     • Riboflavin (VITAMIN B2 PO) Take 1 tablet by mouth Daily.     • [DISCONTINUED] tiotropium bromide-olodaterol (Stiolto Respimat) 2.5-2.5 MCG/ACT aerosol solution inhaler Inhale 2 puffs Daily.       No current facility-administered medications on file prior to visit.        ALLERGIES:    Allergies   Allergen Reactions   • Codeine Mental Status Change        Social History     Socioeconomic History   • Marital status:      Spouse name: Vladimir   • Number of children: 4   Tobacco Use   • Smoking status: Former Smoker     Packs/day: 0.00     Years: 0.00     Pack years: 0.00     Quit date: 1970     Years since quittin.4   • Smokeless tobacco: Never Used   Vaping Use   • Vaping Use: Never used   Substance and Sexual Activity   • Alcohol use: Never   • Drug use: Never   • Sexual activity: Defer        Family History   Problem Relation Age of Onset   • Heart failure Father    • Skin cancer Father    •  "Heart disease Father    • Breast cancer Sister 46   • Heart disease Sister    • Esophageal cancer Brother    • Heart disease Brother    • Leukemia Daughter    • Breast cancer Maternal Grandmother         70s   • Colon cancer Maternal Grandmother    • Malig Hyperthermia Neg Hx         Review of Systems   Constitutional: Negative.  Negative for appetite change, chills, diaphoresis, fatigue, fever and unexpected weight change.   HENT: Negative for hearing loss, sore throat and trouble swallowing.    Eyes: Negative.    Respiratory: Positive for shortness of breath. Negative for cough, chest tightness and wheezing.    Cardiovascular: Negative.  Negative for chest pain, palpitations and leg swelling.   Gastrointestinal: Negative.  Negative for abdominal distention, abdominal pain, constipation, diarrhea, nausea and vomiting.   Endocrine: Negative.    Genitourinary: Negative.  Negative for dysuria, frequency, hematuria and urgency.   Musculoskeletal: Negative.  Negative for joint swelling.        No muscle weakness.   Skin: Negative.  Negative for rash and wound.   Allergic/Immunologic: Negative.    Neurological: Negative for seizures, syncope, speech difficulty, weakness and numbness. Dizziness: 11/24/20-Unchanged. Headaches: 11/24/20-Unchanged.   Hematological: Negative.  Negative for adenopathy. Does not bruise/bleed easily.   Psychiatric/Behavioral: Negative.  Negative for behavioral problems, confusion and suicidal ideas.   All other systems reviewed and are negative.  Patient has vertigo stable  I have reviewed and confirmed the accuracy of the ROS as documented by the MA/PHANI/MARGOTH Faulkner MD    I have reviewed and confirmed the accuracy of the ROS as documented by the MA/PHANI/MARGOTH Faulkner MD        Objective     Vitals:    06/14/22 1325   BP: 158/74   Pulse: 63   Resp: 16   Temp: 97.1 °F (36.2 °C)   TempSrc: Temporal   SpO2: 99%   Weight: 58.9 kg (129 lb 14.4 oz)   Height: 167.6 cm (65.98\")   PainSc: 0-No " pain     Current Status 6/14/2022   ECOG score 0     Physical examination      This patient's ACP documentation is up to date, and there's nothing further left to document.      CONSTITUTIONAL:  Vital signs reviewed.  No distress, looks comfortable.  BREAST: Right breast: No skin changes, no evidence of breast mass, no nipple discharge, no evidence of any right axillary adenopathy or right supraclavicular adenopathy  Left breast: No evidence of any skin changes, no evidence of any left breast mass and no evidence of left nipple discharge as well as no left axillary adenopathy or left supraclavicular adenopathy.  RESPIRATORY:  Normal respiratory effort.  Lungs clear to auscultation bilaterally.  CARDIOVASCULAR:  Normal S1, S2.  No murmurs rubs or gallops.  No significant lower extremity edema.  GASTROINTESTINAL: Abdomen appears unremarkable.  Nontender.  No hepatomegaly.  No splenomegaly.  LYMPHATIC:  No cervical, supraclavicular, axillary lymphadenopathy.  SKIN:  Warm.  No rashes.  PSYCHIATRIC:  Normal judgment and insight.  Normal mood and affect.            RECENT LABS:  Hematology WBC   Date Value Ref Range Status   06/14/2022 6.04 3.40 - 10.80 10*3/mm3 Final   10/21/2021 5.4 3.4 - 10.8 x10E3/uL Final   09/17/2018 6.22 4.5 - 11.0 10*3/uL Final     RBC   Date Value Ref Range Status   06/14/2022 4.24 3.77 - 5.28 10*6/mm3 Final   10/21/2021 3.80 3.77 - 5.28 x10E6/uL Final   09/17/2018 4.34 4.0 - 5.2 10*6/uL Final     Hemoglobin   Date Value Ref Range Status   06/14/2022 12.8 12.0 - 15.9 g/dL Final   09/17/2018 13.2 12.0 - 16.0 g/dL Final     Hematocrit   Date Value Ref Range Status   06/14/2022 40.4 34.0 - 46.6 % Final   09/17/2018 41.3 36.0 - 46.0 % Final     Platelets   Date Value Ref Range Status   06/14/2022 262 140 - 450 10*3/mm3 Final   09/17/2018 457 (H) 140 - 440 10*3/uL Final          Assessment & Plan     1. pT1c pNX Invasive Lobular Carcinoma of the Left Breast measuring 20 mm, grade II.  Her tumor was  ER+ 96%, OR+ 96%, and HER2/clayton Negative (0) with Ki67 of 3.34%.    -Screening mammogram, 12 mm mass, spiculated margins left breast, 1 o'clock position, 4 cm from the nipple  -Diagnostic mammogram, irregular 15 mm mass left breast , 4 cm from the nipple  -Ultrasound showed irregular mass 15 x 13 x 13 at 12:00, oval mass with indistinct margin 7 x 3 x 5 mm left breast at 1:00  -Left breast ultrasound-guided biopsy, 12 o'clock position, invasive carcinoma with ductal and lobular features  -left breast C, 1 o'clock position, fibroadenoma  -February 5, 2020, left breast lumpectomy, invasive lobular carcinoma, 20 x 14 x 10 mm, grade 2, Vernon score of 5 no lymphovascular space invasion, no evidence of DCIS, margins are all negative, rare focus of atypical lobular hyperplasia, not present at margin,    · I had a lengthy discussion with the patient and family that at the age of 86 years given that this is highly ER OR positive tumor, HER-2 negative, she is not a candidate for any chemotherapy given her age.  I do not see any need to obtain an Oncotype DX in the setting.  She is certainly a candidate for endocrine therapy.  My question would be if sentinel lymph node has to be considered given that she had a 20 mm invasive lobular carcinoma.  However patient is being seen by radiation oncology and will get input from them as well to see if axilla would be included.  Will discuss with Dr. Plasencia.  · Patient to consult radiation oncology for opinion on 03/03/20.  Given patient's cancer was ER/OR+, she will certainly benefit from endocrine therapy.  Given her past medical history and current health, I think Arimidex would be the best choice of treatment.  Patient has refused to undergo any endocrine therapy after explaining the details of the side effects.  She is to follow-up with radiation oncology and so far 17 out of 21 treatments given  Patient has refused endocrine therapy even after discussing in length.  Patient  is here for routine follow-up.  Her mammogram December 20, 2021 was negative.  She has refused endocrine therapy in the past given her age.    2. Family history of malignancy.  Her family history consists of her sister who had breast cancer at age 46.  Her maternal grandmother had breast cancer in her 70s.  Her daughter has chronic lymphocytic leukemia.  · Genetic testing could be considered to see if that information benefits the rest of the family.        3. History of skin cancer in the right leg (2008) and left nose (2015).    4. History of Melanoma of the left leg (2017).  Patient to follow with dermatology    5. Vertigo.  Patient reports feeling dizzy constantly.  Stable  6. Osteopenia: Patient to take calcium and vitamin D supplements.  Discussed either oral bisphosphonates like Actonel or Fosamax or Prolia injectable but patient refuses  · Last bone density March 2020 and she is not due till March 2022. She has osteopenia but refused any bisphosphonates    7. Fatigue since radiation likely related to radiation  8. Chronic bilateral parenchymal infiltrates,            PLAN:  · Patient is followed by pulmonary for chronic bilateral parenchymal infiltrates  · She is s/p COVID and has fatigue since then  · We will plan to bring her back in 6 months with labs  · She has refused endocrine therapy  · She will continue calcium and vitamin D supplements    Sowmya Faulkner MD       Cc:  MD Sima Costa MD Stephen Self, MD Stuart Spalding, MD

## 2022-07-14 ENCOUNTER — OFFICE VISIT (OUTPATIENT)
Dept: FAMILY MEDICINE CLINIC | Facility: CLINIC | Age: 87
End: 2022-07-14

## 2022-07-14 VITALS
HEIGHT: 66 IN | OXYGEN SATURATION: 100 % | HEART RATE: 72 BPM | WEIGHT: 130.2 LBS | DIASTOLIC BLOOD PRESSURE: 60 MMHG | TEMPERATURE: 97.1 F | SYSTOLIC BLOOD PRESSURE: 110 MMHG | BODY MASS INDEX: 20.93 KG/M2

## 2022-07-14 DIAGNOSIS — E03.9 ACQUIRED HYPOTHYROIDISM: ICD-10-CM

## 2022-07-14 DIAGNOSIS — R42 DIZZINESS: ICD-10-CM

## 2022-07-14 DIAGNOSIS — I10 ESSENTIAL HYPERTENSION: ICD-10-CM

## 2022-07-14 DIAGNOSIS — I73.9 PAD (PERIPHERAL ARTERY DISEASE): ICD-10-CM

## 2022-07-14 DIAGNOSIS — Z00.00 MEDICARE ANNUAL WELLNESS VISIT, SUBSEQUENT: Primary | ICD-10-CM

## 2022-07-14 DIAGNOSIS — Z28.39 IMMUNIZATION DEFICIENCY: ICD-10-CM

## 2022-07-14 PROCEDURE — 1170F FXNL STATUS ASSESSED: CPT | Performed by: FAMILY MEDICINE

## 2022-07-14 PROCEDURE — 91305 COVID-19 (PFIZER) 12+ YRS: CPT | Performed by: FAMILY MEDICINE

## 2022-07-14 PROCEDURE — 0051A COVID-19 (PFIZER) 12+ YRS: CPT | Performed by: FAMILY MEDICINE

## 2022-07-14 PROCEDURE — G0439 PPPS, SUBSEQ VISIT: HCPCS | Performed by: FAMILY MEDICINE

## 2022-07-14 PROCEDURE — 1159F MED LIST DOCD IN RCRD: CPT | Performed by: FAMILY MEDICINE

## 2022-07-14 PROCEDURE — 99214 OFFICE O/P EST MOD 30 MIN: CPT | Performed by: FAMILY MEDICINE

## 2022-07-14 RX ORDER — MECLIZINE HYDROCHLORIDE 25 MG/1
25 TABLET ORAL 3 TIMES DAILY PRN
Qty: 90 TABLET | Refills: 3 | Status: SHIPPED | OUTPATIENT
Start: 2022-07-14

## 2022-07-14 RX ORDER — AMLODIPINE BESYLATE 2.5 MG/1
TABLET ORAL
Qty: 90 TABLET | Refills: 3 | Status: SHIPPED | OUTPATIENT
Start: 2022-07-14

## 2022-07-14 NOTE — PROGRESS NOTES
The ABCs of the Annual Wellness Visit  Subsequent Medicare Wellness Visit    Chief Complaint   Patient presents with   • Medicare Wellness-subsequent   • Dizziness     Throughout the day ongoing since covid in Sept 2021      Subjective    History of Present Illness:  Mela Muse is a 89 y.o. female who presents for a Subsequent Medicare Wellness Visit.  C/o dizziness.  I at times get dizzy with swimmy.   I get vertigo at times.    F/U hypothyroidism.  Doing nwell with meds.   The following portions of the patient's history were reviewed and   updated as appropriate: allergies, current medications, past family history, past medical history, past social history, past surgical history and problem list.    Compared to one year ago, the patient feels her physical   health is the same.    Compared to one year ago, the patient feels her mental   health is the same.    Recent Hospitalizations:  She was not admitted to the hospital during the last year.       Current Medical Providers:  Patient Care Team:  Yuniel Bonilla MD as PCP - General  Yuniel Bonilla MD as PCP - Family Medicine  Self, Khoa Quick MD (Vascular Surgery)  Sima Joiner MD (Inactive) as Consulting Physician (Radiation Oncology)  Sowmya Faulkner MD as Consulting Physician (Hematology and Oncology)  Kush Plasencia MD as Referring Physician (Breast Surgery)    Outpatient Medications Prior to Visit   Medication Sig Dispense Refill   • aspirin 81 MG chewable tablet Chew 81 mg Daily.     • calcium carbonate (OS-PAUL) 600 MG tablet Take 600 mg by mouth Daily.     • cholecalciferol (VITAMIN D3) 25 MCG (1000 UT) tablet Take 1,000 Units by mouth Daily.     • fluticasone (FLONASE) 50 MCG/ACT nasal spray      • levothyroxine (SYNTHROID, LEVOTHROID) 75 MCG tablet One a day 90 tablet 3   • magnesium oxide (MAGOX) 400 (241.3 Mg) MG tablet tablet Take 400 mg by mouth Daily.     • Riboflavin (VITAMIN B2 PO) Take 1 tablet by mouth Daily.     •  amLODIPine (NORVASC) 5 MG tablet 1/2 in am. 45 tablet 3   • meclizine (ANTIVERT) 12.5 MG tablet Take 12.5 mg by mouth As Needed for Dizziness.       No facility-administered medications prior to visit.       No opioid medication identified on active medication list. I have reviewed chart for other potential  high risk medication/s and harmful drug interactions in the elderly.          Aspirin is on active medication list. Aspirin use is indicated based on review of current medical condition/s. Pros and cons of this therapy have been discussed today. Benefits of this medication outweigh potential harm.  Patient has been encouraged to continue taking this medication.  .      Patient Active Problem List   Diagnosis   • Malignant neoplasm of breast in female, estrogen receptor positive (HCC)   • Vertigo   • History of skin cancer   • History of melanoma   • Osteoporosis   • Post-menopausal   • Dizziness   • Essential hypertension   • PAD (peripheral artery disease) ([facility])   • Claudication of both lower extremities ([facility])   • Asymptomatic bilateral carotid artery stenosis   • Osteopenia of hip   • Other fatigue   • Medicare annual wellness visit, subsequent   • Acquired hypothyroidism   • Left-sided chest pain   • COVID-19   • Closed fracture of left proximal humerus   • Shortness of breath   • Left upper lobe pulmonary infiltrate   • COPD (chronic obstructive pulmonary disease) ([facility])     Advance Care Planning  Advance Directive is on file.  ACP discussion was held with the patient during this visit. Patient has an advance directive in EMR which is still valid.     Review of Systems   Constitutional: Negative for fatigue and fever.   HENT: Negative for postnasal drip and rhinorrhea.    Respiratory: Negative for cough and shortness of breath.    Cardiovascular: Negative for chest pain and leg swelling.   Neurological: Positive for dizziness.        Objective    Vitals:    07/14/22 0959   BP: 110/60   BP Location: Right arm  "  Patient Position: Sitting   Pulse: 72   Temp: 97.1 °F (36.2 °C)   SpO2: 100%   Weight: 59.1 kg (130 lb 3.2 oz)   Height: 167.6 cm (65.98\")   PainSc: 0-No pain     Estimated body mass index is 21.03 kg/m² as calculated from the following:    Height as of this encounter: 167.6 cm (65.98\").    Weight as of this encounter: 59.1 kg (130 lb 3.2 oz).    BMI is within normal parameters. No other follow-up for BMI required.      Does the patient have evidence of cognitive impairment? No    Physical Exam  Vitals reviewed.   Constitutional:       Appearance: She is well-developed. She is not diaphoretic.   HENT:      Head: Normocephalic and atraumatic.   Eyes:      General: No scleral icterus.     Pupils: Pupils are equal, round, and reactive to light.   Neck:      Thyroid: No thyromegaly.   Cardiovascular:      Rate and Rhythm: Normal rate and regular rhythm.      Heart sounds: No murmur heard.    No friction rub. No gallop.   Pulmonary:      Effort: Pulmonary effort is normal. No respiratory distress.      Breath sounds: No wheezing or rales.   Chest:      Chest wall: No tenderness.   Abdominal:      General: Bowel sounds are normal. There is no distension.      Palpations: Abdomen is soft.      Tenderness: There is no abdominal tenderness.   Musculoskeletal:         General: No deformity. Normal range of motion.   Lymphadenopathy:      Cervical: No cervical adenopathy.   Skin:     General: Skin is warm and dry.      Findings: No rash.   Neurological:      Cranial Nerves: No cranial nerve deficit.      Motor: No abnormal muscle tone.                 HEALTH RISK ASSESSMENT    Smoking Status:  Social History     Tobacco Use   Smoking Status Former Smoker   • Packs/day: 0.00   • Years: 0.00   • Pack years: 0.00   • Quit date: 1970   • Years since quittin.5   Smokeless Tobacco Never Used     Alcohol Consumption:  Social History     Substance and Sexual Activity   Alcohol Use Never     Fall Risk Screen:    STEADI Fall " Risk Assessment was completed, and patient is at MODERATE risk for falls. Assessment completed on:7/14/2022    Depression Screening:  PHQ-2/PHQ-9 Depression Screening 7/14/2022   Retired PHQ-9 Total Score -   Retired Total Score -   Little Interest or Pleasure in Doing Things 0-->not at all   Feeling Down, Depressed or Hopeless 0-->not at all   PHQ-9: Brief Depression Severity Measure Score 0       Health Habits and Functional and Cognitive Screening:  Functional & Cognitive Status 7/14/2022   Do you have difficulty preparing food and eating? No   Do you have difficulty bathing yourself, getting dressed or grooming yourself? No   Do you have difficulty using the toilet? No   Do you have difficulty moving around from place to place? No   Do you have trouble with steps or getting out of a bed or a chair? No   Current Diet Well Balanced Diet   Dental Exam Up to date   Eye Exam Up to date   Exercise (times per week) 4 times per week   Current Exercises Include Walking   Current Exercise Activities Include -   Do you need help using the phone?  No   Are you deaf or do you have serious difficulty hearing?  Yes   Do you need help with transportation? No   Do you need help shopping? No   Do you need help preparing meals?  No   Do you need help with housework?  No   Do you need help with laundry? No   Do you need help taking your medications? No   Do you need help managing money? No   Do you ever drive or ride in a car without wearing a seat belt? No   Have you felt unusual stress, anger or loneliness in the last month? No   Who do you live with? Spouse   If you need help, do you have trouble finding someone available to you? No   Have you been bothered in the last four weeks by sexual problems? -   Do you have difficulty concentrating, remembering or making decisions? No       Age-appropriate Screening Schedule:  Refer to the list below for future screening recommendations based on patient's age, sex and/or medical  conditions. Orders for these recommended tests are listed in the plan section. The patient has been provided with a written plan.    Health Maintenance   Topic Date Due   • TDAP/TD VACCINES (1 - Tdap) Never done   • ZOSTER VACCINE (1 of 2) Never done   • INFLUENZA VACCINE  10/01/2022   • MAMMOGRAM  12/10/2022   • DXA SCAN  05/31/2024              Assessment & Plan   CMS Preventative Services Quick Reference  Risk Factors Identified During Encounter  Inactivity/Sedentary  The above risks/problems have been discussed with the patient.  Follow up actions/plans if indicated are seen below in the Assessment/Plan Section.  Pertinent information has been shared with the patient in the After Visit Summary.    Diagnoses and all orders for this visit:    1. Medicare annual wellness visit, subsequent (Primary)    2. PAD (peripheral artery disease) (Formerly [facility])    3. Acquired hypothyroidism    4. Essential hypertension  -     amLODIPine (NORVASC) 2.5 MG tablet; 1 in am.  Dispense: 90 tablet; Refill: 3    5. Dizziness  -     meclizine (ANTIVERT) 25 MG tablet; Take 1 tablet by mouth 3 (Three) Times a Day As Needed for Dizziness.  Dispense: 90 tablet; Refill: 3    6. Immunization deficiency  -     COVID-19 Vaccine (Pfizer) Gray Cap    Dizziness.  Chronic since 9/21.  Uncontrolled.  Increase meclizine to 25 TID prn dizziness.  CBC , CMP, TSh normal in last 4 months upon review.    Hypothyroidism. TSh normal. 3/22.  Continue replacement.    PAD.  Controlled.  Continue ASA.    HTN.  Controlled.  Continue meds.     Preventive Counseling:  Encouraged to stay active.  Covid vaccine booster 2 today.  Pneumovax UTD.  Dentist UTD.  Optho UTD.        Follow Up:   No follow-ups on file.     An After Visit Summary and PPPS were made available to the patient.

## 2022-10-12 ENCOUNTER — OFFICE VISIT (OUTPATIENT)
Dept: FAMILY MEDICINE CLINIC | Facility: CLINIC | Age: 87
End: 2022-10-12

## 2022-10-12 VITALS
TEMPERATURE: 98.3 F | DIASTOLIC BLOOD PRESSURE: 68 MMHG | HEART RATE: 74 BPM | BODY MASS INDEX: 20.96 KG/M2 | OXYGEN SATURATION: 100 % | SYSTOLIC BLOOD PRESSURE: 112 MMHG | WEIGHT: 129.8 LBS

## 2022-10-12 DIAGNOSIS — R30.0 DYSURIA: Primary | ICD-10-CM

## 2022-10-12 DIAGNOSIS — R10.9 ABDOMINAL PAIN, UNSPECIFIED ABDOMINAL LOCATION: ICD-10-CM

## 2022-10-12 DIAGNOSIS — E64.0 SEQUELAE OF PROTEIN-CALORIE MALNUTRITION: ICD-10-CM

## 2022-10-12 DIAGNOSIS — E55.9 VITAMIN D DEFICIENCY, UNSPECIFIED: ICD-10-CM

## 2022-10-12 DIAGNOSIS — R53.83 FATIGUE, UNSPECIFIED TYPE: ICD-10-CM

## 2022-10-12 LAB
EXPIRATION DATE: NORMAL
FLUAV AG UPPER RESP QL IA.RAPID: NOT DETECTED
FLUBV AG UPPER RESP QL IA.RAPID: NOT DETECTED
INTERNAL CONTROL: NORMAL
Lab: NORMAL
SARS-COV-2 AG UPPER RESP QL IA.RAPID: NOT DETECTED

## 2022-10-12 PROCEDURE — 74018 RADEX ABDOMEN 1 VIEW: CPT | Performed by: FAMILY MEDICINE

## 2022-10-12 PROCEDURE — 87428 SARSCOV & INF VIR A&B AG IA: CPT | Performed by: FAMILY MEDICINE

## 2022-10-12 PROCEDURE — 99213 OFFICE O/P EST LOW 20 MIN: CPT | Performed by: FAMILY MEDICINE

## 2022-10-12 RX ORDER — NITROFURANTOIN 25; 75 MG/1; MG/1
100 CAPSULE ORAL 2 TIMES DAILY
COMMUNITY
Start: 2022-10-04 | End: 2022-11-22

## 2022-10-12 NOTE — PROGRESS NOTES
"Chief Complaint  Difficulty Urinating (Started 10/02/2022) and Anorexia (Not eating or drinking )    Subjective    {Problem List  Visit Diagnosis   Encounters  Notes  Medications  Labs  Result Review Imaging  Media :23}    Mela Muse presents to [facility]  History of Present Illness provided by patient and daughter  No appetite x a week and half, no fever, + nausea, no emesis, lower abd pain, on/off, not rated,  Moderate, diarrhea, watery,  No burning with urination sister in law  MD gave macrobid, no flu shot or covid , using protein shake, no Alzheimer's or parkinson's  Objective   Vital Signs:  /68 (BP Location: Right arm, Patient Position: Sitting, Cuff Size: Adult)   Pulse 74   Temp 98.3 °F (36.8 °C) (Temporal)   Wt 58.9 kg (129 lb 12.8 oz)   SpO2 100%   BMI 20.96 kg/m²   Estimated body mass index is 20.96 kg/m² as calculated from the following:    Height as of 7/14/22: 167.6 cm (65.98\").    Weight as of this encounter: 58.9 kg (129 lb 12.8 oz).    BMI is within normal parameters. No other follow-up for BMI required.      Physical Exam  Vitals and nursing note reviewed.   Constitutional:       General: She is not in acute distress.     Appearance: Normal appearance. She is well-developed. She is not ill-appearing, toxic-appearing or diaphoretic.   HENT:      Head: Normocephalic and atraumatic.      Right Ear: Tympanic membrane, ear canal and external ear normal. There is no impacted cerumen.      Left Ear: Tympanic membrane, ear canal and external ear normal. There is no impacted cerumen.      Nose: Nose normal. No congestion or rhinorrhea.      Mouth/Throat:      Mouth: Mucous membranes are moist.      Pharynx: Oropharynx is clear. No oropharyngeal exudate or posterior oropharyngeal erythema.   Eyes:      General: No scleral icterus.        Right eye: No discharge.         Left eye: No discharge.      Extraocular Movements: Extraocular movements intact. "      Conjunctiva/sclera: Conjunctivae normal.      Pupils: Pupils are equal, round, and reactive to light.   Neck:      Thyroid: No thyromegaly.      Vascular: No carotid bruit or JVD.      Trachea: No tracheal deviation.   Cardiovascular:      Rate and Rhythm: Normal rate and regular rhythm.      Heart sounds: Normal heart sounds. No murmur heard.    No friction rub. No gallop.   Pulmonary:      Effort: Pulmonary effort is normal. No respiratory distress.      Breath sounds: Normal breath sounds. No stridor. No wheezing, rhonchi or rales.   Chest:      Chest wall: No tenderness.   Abdominal:      General: Bowel sounds are normal. There is no distension.      Palpations: Abdomen is soft. There is no mass.      Tenderness: There is no abdominal tenderness. There is no right CVA tenderness, left CVA tenderness, guarding or rebound.      Hernia: No hernia is present.   Musculoskeletal:         General: Normal range of motion.      Cervical back: Normal range of motion and neck supple. No rigidity or tenderness.      Right lower leg: No edema.      Left lower leg: No edema.   Lymphadenopathy:      Cervical: No cervical adenopathy.   Skin:     General: Skin is warm and dry.      Coloration: Skin is not jaundiced.   Neurological:      General: No focal deficit present.      Mental Status: She is alert and oriented to person, place, and time. Mental status is at baseline.      Sensory: No sensory deficit.      Motor: No weakness or abnormal muscle tone.      Coordination: Coordination normal.      Gait: Gait normal.      Deep Tendon Reflexes: Reflexes normal.   Psychiatric:         Mood and Affect: Mood normal.         Behavior: Behavior normal.         Thought Content: Thought content normal.         Judgment: Judgment normal.        Result Review :                Assessment and Plan   Diagnoses and all orders for this visit:    1. Dysuria (Primary)  -     Cancel: POC Urinalysis Dipstick, Automated  -     Urine Culture -  Urine, Urine, Clean Catch  -     CBC & Differential  -     Comprehensive Metabolic Panel  -     Lipid Panel  -     TSH  -     Cancel: POC Urinalysis Dipstick, Automated  -     Vitamin D 25 hydroxy  -     Vitamin B12  -     POC Urinalysis Dipstick, Automated; Future    2. Fatigue, unspecified type  -     Cancel: CBC & Differential  -     Cancel: Comprehensive Metabolic Panel  -     Cancel: TSH  -     Cancel: Vitamin B12  -     POCT SARS-CoV-2 Antigen NOMAN + Flu  -     CBC & Differential  -     Comprehensive Metabolic Panel  -     Lipid Panel  -     TSH  -     Cancel: POC Urinalysis Dipstick, Automated  -     Vitamin D 25 hydroxy  -     Vitamin B12  -     POC Urinalysis Dipstick, Automated; Future    3. Sequelae of protein-calorie malnutrition (HCC)  -     Lipid Panel    4. Vitamin D deficiency, unspecified  -     Vitamin D 25 hydroxy    5. Abdominal pain, unspecified abdominal location  -     XR Abdomen KUB  -     CT Abdomen Pelvis Without Contrast; Future      A KUB X-Ray was ordered. My reading of this film is negative. (No comparison films available: pending review by Radiologist.)  Patient agreed to go to the ER if no improvement       Follow Up   No follow-ups on file.  Patient was given instructions and counseling regarding her condition or for health maintenance advice. Please see specific information pulled into the AVS if appropriate.

## 2022-10-13 ENCOUNTER — CLINICAL SUPPORT (OUTPATIENT)
Dept: FAMILY MEDICINE CLINIC | Facility: CLINIC | Age: 87
End: 2022-10-13

## 2022-10-13 DIAGNOSIS — R53.83 FATIGUE, UNSPECIFIED TYPE: ICD-10-CM

## 2022-10-13 DIAGNOSIS — E03.9 ACQUIRED HYPOTHYROIDISM: Primary | ICD-10-CM

## 2022-10-13 DIAGNOSIS — R30.0 DYSURIA: Primary | ICD-10-CM

## 2022-10-13 DIAGNOSIS — R30.0 DYSURIA: ICD-10-CM

## 2022-10-13 LAB
ALBUMIN SERPL-MCNC: 3.7 G/DL (ref 3.5–5.2)
ALBUMIN/GLOB SERPL: 1.4 G/DL
ALP SERPL-CCNC: 145 U/L (ref 39–117)
ALT SERPL-CCNC: 24 U/L (ref 1–33)
AST SERPL-CCNC: 17 U/L (ref 1–32)
BASOPHILS # BLD AUTO: 0.08 10*3/MM3 (ref 0–0.2)
BASOPHILS NFR BLD AUTO: 1 % (ref 0–1.5)
BILIRUB BLD-MCNC: NEGATIVE MG/DL
BILIRUB SERPL-MCNC: 0.5 MG/DL (ref 0–1.2)
BUN SERPL-MCNC: 16 MG/DL (ref 8–23)
BUN/CREAT SERPL: 17.6 (ref 7–25)
CALCIUM SERPL-MCNC: 9.4 MG/DL (ref 8.6–10.5)
CHLORIDE SERPL-SCNC: 99 MMOL/L (ref 98–107)
CLARITY, POC: CLEAR
CO2 SERPL-SCNC: 27.2 MMOL/L (ref 22–29)
COLOR UR: YELLOW
CREAT SERPL-MCNC: 0.91 MG/DL (ref 0.57–1)
EGFRCR SERPLBLD CKD-EPI 2021: 60.4 ML/MIN/1.73
EOSINOPHIL # BLD AUTO: 0.32 10*3/MM3 (ref 0–0.4)
EOSINOPHIL NFR BLD AUTO: 4 % (ref 0.3–6.2)
ERYTHROCYTE [DISTWIDTH] IN BLOOD BY AUTOMATED COUNT: 12.2 % (ref 12.3–15.4)
EXPIRATION DATE: NORMAL
GLOBULIN SER CALC-MCNC: 2.6 GM/DL
GLUCOSE SERPL-MCNC: 112 MG/DL (ref 65–99)
GLUCOSE UR STRIP-MCNC: NEGATIVE MG/DL
HCT VFR BLD AUTO: 35.9 % (ref 34–46.6)
HGB BLD-MCNC: 12 G/DL (ref 12–15.9)
IMM GRANULOCYTES # BLD AUTO: 0.08 10*3/MM3 (ref 0–0.05)
IMM GRANULOCYTES NFR BLD AUTO: 1 % (ref 0–0.5)
KETONES UR QL: NEGATIVE
LEUKOCYTE EST, POC: NEGATIVE
LYMPHOCYTES # BLD AUTO: 1.87 10*3/MM3 (ref 0.7–3.1)
LYMPHOCYTES NFR BLD AUTO: 23.4 % (ref 19.6–45.3)
Lab: NORMAL
MCH RBC QN AUTO: 30.6 PG (ref 26.6–33)
MCHC RBC AUTO-ENTMCNC: 33.4 G/DL (ref 31.5–35.7)
MCV RBC AUTO: 91.6 FL (ref 79–97)
MONOCYTES # BLD AUTO: 0.66 10*3/MM3 (ref 0.1–0.9)
MONOCYTES NFR BLD AUTO: 8.3 % (ref 5–12)
NEUTROPHILS # BLD AUTO: 4.99 10*3/MM3 (ref 1.7–7)
NEUTROPHILS NFR BLD AUTO: 62.3 % (ref 42.7–76)
NITRITE UR-MCNC: NEGATIVE MG/ML
NRBC BLD AUTO-RTO: 0 /100 WBC (ref 0–0.2)
PH UR: 6.5 [PH] (ref 5–8)
PLATELET # BLD AUTO: 490 10*3/MM3 (ref 140–450)
POTASSIUM SERPL-SCNC: 4.6 MMOL/L (ref 3.5–5.2)
PROT SERPL-MCNC: 6.3 G/DL (ref 6–8.5)
PROT UR STRIP-MCNC: NEGATIVE MG/DL
RBC # BLD AUTO: 3.92 10*6/MM3 (ref 3.77–5.28)
RBC # UR STRIP: NEGATIVE /UL
SODIUM SERPL-SCNC: 137 MMOL/L (ref 136–145)
SP GR UR: 1.01 (ref 1–1.03)
TSH SERPL DL<=0.005 MIU/L-ACNC: 4.25 UIU/ML (ref 0.27–4.2)
UROBILINOGEN UR QL: NORMAL
VIT B12 SERPL-MCNC: 867 PG/ML (ref 211–946)
WBC # BLD AUTO: 8 10*3/MM3 (ref 3.4–10.8)

## 2022-10-13 PROCEDURE — 81003 URINALYSIS AUTO W/O SCOPE: CPT | Performed by: FAMILY MEDICINE

## 2022-10-15 LAB
BACTERIA UR CULT: NORMAL
BACTERIA UR CULT: NORMAL

## 2022-10-19 ENCOUNTER — TELEPHONE (OUTPATIENT)
Dept: FAMILY MEDICINE CLINIC | Facility: CLINIC | Age: 87
End: 2022-10-19

## 2022-10-19 NOTE — TELEPHONE ENCOUNTER
[facility]    Normal urine culture if no better I can refer her to a Urologist

## 2022-11-09 ENCOUNTER — APPOINTMENT (OUTPATIENT)
Dept: CT IMAGING | Facility: HOSPITAL | Age: 87
End: 2022-11-09

## 2022-11-22 ENCOUNTER — OFFICE VISIT (OUTPATIENT)
Dept: FAMILY MEDICINE CLINIC | Facility: CLINIC | Age: 87
End: 2022-11-22

## 2022-11-22 VITALS
SYSTOLIC BLOOD PRESSURE: 126 MMHG | HEART RATE: 61 BPM | DIASTOLIC BLOOD PRESSURE: 75 MMHG | WEIGHT: 130.8 LBS | TEMPERATURE: 98.6 F | OXYGEN SATURATION: 90 % | HEIGHT: 66 IN | BODY MASS INDEX: 21.02 KG/M2

## 2022-11-22 DIAGNOSIS — I10 ESSENTIAL HYPERTENSION: ICD-10-CM

## 2022-11-22 DIAGNOSIS — E03.9 ACQUIRED HYPOTHYROIDISM: ICD-10-CM

## 2022-11-22 DIAGNOSIS — Z28.39 IMMUNIZATION DEFICIENCY: ICD-10-CM

## 2022-11-22 DIAGNOSIS — D75.839 THROMBOCYTOSIS: Primary | ICD-10-CM

## 2022-11-22 PROCEDURE — 0124A COVID-19 (PFIZER) BIVALENT BOOSTER 12+YRS: CPT | Performed by: FAMILY MEDICINE

## 2022-11-22 PROCEDURE — 99214 OFFICE O/P EST MOD 30 MIN: CPT | Performed by: FAMILY MEDICINE

## 2022-11-22 PROCEDURE — 91312 COVID-19 (PFIZER) BIVALENT BOOSTER 12+YRS: CPT | Performed by: FAMILY MEDICINE

## 2022-11-22 NOTE — PROGRESS NOTES
Chief Complaint   Patient presents with   • Hypertension       Subjective   eMla Muse is a 89 y.o. female.     History of Present Illness   F/U HTN.  Doing well with meds.    F/U hypothyroidism.  Doing well with meds.    F/U thrombocytosis.  Plt ct 490 from 10/12.    The following portions of the patient's history were reviewed and updated as appropriate: allergies, current medications, past family history, past medical history, past social history, past surgical history and problem list.    Review of Systems   Constitutional: Negative for appetite change and fatigue.   HENT: Negative for nosebleeds and sore throat.    Eyes: Negative for blurred vision and visual disturbance.   Respiratory: Negative for shortness of breath and wheezing.    Cardiovascular: Negative for chest pain and leg swelling.   Gastrointestinal: Negative for abdominal distention and abdominal pain.   Endocrine: Negative for cold intolerance and polyuria.   Genitourinary: Negative for dysuria and hematuria.   Musculoskeletal: Negative for arthralgias and myalgias.   Skin: Negative for color change and rash.   Neurological: Negative for weakness and confusion.   Psychiatric/Behavioral: Negative for agitation and depressed mood.       Patient Active Problem List   Diagnosis   • Malignant neoplasm of breast in female, estrogen receptor positive (HCC)   • Vertigo   • History of skin cancer   • History of melanoma   • Osteoporosis   • Post-menopausal   • Dizziness   • Essential hypertension   • PAD (peripheral artery disease) ([facility])   • Claudication of both lower extremities ([facility])   • Asymptomatic bilateral carotid artery stenosis   • Osteopenia of hip   • Other fatigue   • Medicare annual wellness visit, subsequent   • Acquired hypothyroidism   • Left-sided chest pain   • COVID-19   • Closed fracture of left proximal humerus   • Shortness of breath   • Left upper lobe pulmonary infiltrate   • COPD (chronic obstructive pulmonary disease) ([facility])  "  • Dysuria   • Fatigue   • Sequelae of protein-calorie malnutrition (HCC)   • Vitamin D deficiency, unspecified   • Abdominal pain   • Thrombocytosis   • Immunization deficiency       Allergies   Allergen Reactions   • Codeine Mental Status Change         Current Outpatient Medications:   •  amLODIPine (NORVASC) 2.5 MG tablet, 1 in am., Disp: 90 tablet, Rfl: 3  •  aspirin 81 MG chewable tablet, Chew 81 mg Daily., Disp: , Rfl:   •  calcium carbonate (OS-PAUL) 600 MG tablet, Take 600 mg by mouth Daily., Disp: , Rfl:   •  cholecalciferol (VITAMIN D3) 25 MCG (1000 UT) tablet, Take 1,000 Units by mouth Daily., Disp: , Rfl:   •  fluticasone (FLONASE) 50 MCG/ACT nasal spray, , Disp: , Rfl:   •  levothyroxine (SYNTHROID, LEVOTHROID) 75 MCG tablet, One a day, Disp: 90 tablet, Rfl: 3  •  magnesium oxide (MAGOX) 400 (241.3 Mg) MG tablet tablet, Take 400 mg by mouth Daily., Disp: , Rfl:   •  meclizine (ANTIVERT) 25 MG tablet, Take 1 tablet by mouth 3 (Three) Times a Day As Needed for Dizziness., Disp: 90 tablet, Rfl: 3  •  Riboflavin (VITAMIN B2 PO), Take 1 tablet by mouth Daily., Disp: , Rfl:     Past Medical History:   Diagnosis Date   • Anxiety    • Arthritis    • Cancer (HCC) 01/2020    left breast   • Cancer of skin of leg, right 2021   • Coronary artery disease     \"BLOCKAGE IN MY LEGS\" stents bilateral legs per pt   • COVID-19 virus infection 09/13/2021   • Hemorrhoids    • History of basal cell cancer    • History of headaches    • Humerus fracture     LEFT    • Hypertension    • Hypothyroidism    • Melanoma (HCC) 2017    LEFT LEG   • Osteopenia    • PAD (peripheral artery disease) (HCC)    • Skin cancer 2015    left nose   • Skin cancer 2008    right leg   • Urinary tract infection 10/12/22    Treated in October   • Vertigo    • Vestibular migraine 2021       Past Surgical History:   Procedure Laterality Date   • BREAST LUMPECTOMY WITH SENTINEL NODE BIOPSY Left 2/5/2020    Procedure: BREAST LUMPECTOMY WITH NEEDLE " LOCALIZATION;  Surgeon: Kush Plasencia MD;  Location: [facility];  Service: General;  Laterality: Left;   • BREAST SURGERY Left     BIOPSY   • COLONOSCOPY      ALL OK   • COSMETIC SURGERY      AFTER SKIN EXCISION FROM NOSE   • ENDOSCOPY     • EYE SURGERY Bilateral     CATARACTS   • FRACTURE SURGERY  21   • LEG SURGERY Left 2017    MELANOMA EXCISION-WITH SKIN GRAFT   • ORIF HUMERUS FRACTURE Left 2021    Procedure: Open Reduction and Internal Fixation Proximal Humerus;  Surgeon: Sherman Panchal MD;  Location: [facility] OR;  Service: Orthopedics;  Laterality: Left;   • SKIN BIOPSY  2008   • SKIN CANCER EXCISION Bilateral     NOSE, UPPER LIP   • SKIN GRAFT Bilateral    • VASCULAR SURGERY Bilateral 2018    STENTS IN BOTH UPPER LEGS       Family History   Problem Relation Age of Onset   • Heart failure Father    • Skin cancer Father    • Heart disease Father    • Breast cancer Sister 46   • Heart disease Sister    • Esophageal cancer Brother    • Heart disease Brother    • Leukemia Daughter    • Breast cancer Maternal Grandmother         70s   • Colon cancer Maternal Grandmother    • Cancer Sister         Breast   • Cancer Brother         Esophageal   • Malig Hyperthermia Neg Hx        Social History     Tobacco Use   • Smoking status: Former     Packs/day: 1.00     Years: 20.00     Pack years: 20.00     Types: Cigarettes     Quit date: 1970     Years since quittin.9   • Smokeless tobacco: Never   • Tobacco comments:     20 years. None    Substance Use Topics   • Alcohol use: Never            Objective     Vitals:    22 0924   BP: 126/75   Pulse: 61   Temp: 98.6 °F (37 °C)   SpO2: 90%     Body mass index is 21.12 kg/m².    Physical Exam  Vitals reviewed.   Constitutional:       Appearance: She is well-developed. She is not diaphoretic.   HENT:      Head: Normocephalic and atraumatic.   Eyes:      General: No scleral icterus.     Pupils: Pupils are equal, round, and  reactive to light.   Neck:      Thyroid: No thyromegaly.   Cardiovascular:      Rate and Rhythm: Normal rate and regular rhythm.      Heart sounds: No murmur heard.    No friction rub. No gallop.   Pulmonary:      Effort: Pulmonary effort is normal. No respiratory distress.      Breath sounds: No wheezing or rales.   Chest:      Chest wall: No tenderness.   Abdominal:      General: Bowel sounds are normal. There is no distension.      Palpations: Abdomen is soft.      Tenderness: There is no abdominal tenderness.   Musculoskeletal:         General: No deformity. Normal range of motion.   Lymphadenopathy:      Cervical: No cervical adenopathy.   Skin:     General: Skin is warm and dry.      Findings: No rash.   Neurological:      Cranial Nerves: No cranial nerve deficit.      Motor: No abnormal muscle tone.         Lab Results   Component Value Date    GLUCOSE 112 (H) 10/12/2022    BUN 16 10/12/2022    CREATININE 0.91 10/12/2022    EGFRIFNONA 51 (L) 12/14/2021    EGFRIFAFRI 63 10/21/2021    BCR 17.6 10/12/2022    K 4.6 10/12/2022    CO2 27.2 10/12/2022    CALCIUM 9.4 10/12/2022    PROTENTOTREF 6.3 10/12/2022    ALBUMIN 3.70 10/12/2022    LABIL2 1.4 10/12/2022    AST 17 10/12/2022    ALT 24 10/12/2022       WBC   Date Value Ref Range Status   10/12/2022 8.00 3.40 - 10.80 10*3/mm3 Final   09/17/2018 6.22 4.5 - 11.0 10*3/uL Final     RBC   Date Value Ref Range Status   10/12/2022 3.92 3.77 - 5.28 10*6/mm3 Final   09/17/2018 4.34 4.0 - 5.2 10*6/uL Final     Hemoglobin   Date Value Ref Range Status   10/12/2022 12.0 12.0 - 15.9 g/dL Final   06/14/2022 12.8 12.0 - 15.9 g/dL Final   09/17/2018 13.2 12.0 - 16.0 g/dL Final     Hematocrit   Date Value Ref Range Status   10/12/2022 35.9 34.0 - 46.6 % Final   06/14/2022 40.4 34.0 - 46.6 % Final   09/17/2018 41.3 36.0 - 46.0 % Final     MCV   Date Value Ref Range Status   10/12/2022 91.6 79.0 - 97.0 fL Final   06/14/2022 95.3 79.0 - 97.0 fL Final   09/17/2018 95.2 80.0 - 100.0 fL  Final     MCH   Date Value Ref Range Status   10/12/2022 30.6 26.6 - 33.0 pg Final   06/14/2022 30.2 26.6 - 33.0 pg Final   09/17/2018 30.4 26.0 - 34.0 pg Final     MCHC   Date Value Ref Range Status   10/12/2022 33.4 31.5 - 35.7 g/dL Final   06/14/2022 31.7 31.5 - 35.7 g/dL Final   09/17/2018 32.0 31.0 - 37.0 g/dL Final     RDW   Date Value Ref Range Status   10/12/2022 12.2 (L) 12.3 - 15.4 % Final   06/14/2022 13.3 12.3 - 15.4 % Final   09/17/2018 13.7 12.0 - 16.8 % Final     RDW-SD   Date Value Ref Range Status   06/14/2022 46.4 37.0 - 54.0 fl Final     MPV   Date Value Ref Range Status   06/14/2022 9.4 6.0 - 12.0 fL Final   09/17/2018 9.6 6.7 - 10.8 fL Final     Platelets   Date Value Ref Range Status   10/12/2022 490 (H) 140 - 450 10*3/mm3 Final   06/14/2022 262 140 - 450 10*3/mm3 Final   09/17/2018 457 (H) 140 - 440 10*3/uL Final     Neutrophil Rel %   Date Value Ref Range Status   10/12/2022 62.3 42.7 - 76.0 % Final   09/17/2018 62.9 45 - 80 % Final     Neutrophil %   Date Value Ref Range Status   06/14/2022 63.2 42.7 - 76.0 % Final     Lymphocyte Rel %   Date Value Ref Range Status   10/12/2022 23.4 19.6 - 45.3 % Final   09/17/2018 23.0 15 - 50 % Final     Lymphocyte %   Date Value Ref Range Status   06/14/2022 25.5 19.6 - 45.3 % Final     Monocyte Rel %   Date Value Ref Range Status   10/12/2022 8.3 5.0 - 12.0 % Final   09/17/2018 8.7 0 - 15 % Final     Monocyte %   Date Value Ref Range Status   06/14/2022 7.0 5.0 - 12.0 % Final     Eosinophil Rel %   Date Value Ref Range Status   10/12/2022 4.0 0.3 - 6.2 % Final     Eosinophil %   Date Value Ref Range Status   06/14/2022 2.8 0.3 - 6.2 % Final   09/17/2018 3.4 0 - 7 % Final     Basophil Rel %   Date Value Ref Range Status   10/12/2022 1.0 0.0 - 1.5 % Final   09/17/2018 1.8 0 - 2 % Final     Basophil %   Date Value Ref Range Status   06/14/2022 1.3 0.0 - 1.5 % Final     Immature Grans %   Date Value Ref Range Status   06/14/2022 0.2 0.0 - 0.5 % Final    09/17/2018 0.2 (H) 0 % Final     Neutrophils Absolute   Date Value Ref Range Status   10/12/2022 4.99 1.70 - 7.00 10*3/mm3 Final   09/17/2018 3.92 2.0 - 8.8 10*3/uL Final     Neutrophils, Absolute   Date Value Ref Range Status   06/14/2022 3.82 1.70 - 7.00 10*3/mm3 Final     Lymphocytes Absolute   Date Value Ref Range Status   10/12/2022 1.87 0.70 - 3.10 10*3/mm3 Final   09/17/2018 1.43 0.7 - 5.5 10*3/uL Final     Lymphocytes, Absolute   Date Value Ref Range Status   06/14/2022 1.54 0.70 - 3.10 10*3/mm3 Final     Monocytes Absolute   Date Value Ref Range Status   10/12/2022 0.66 0.10 - 0.90 10*3/mm3 Final   09/17/2018 0.54 0.0 - 1.7 10*3/uL Final     Monocytes, Absolute   Date Value Ref Range Status   06/14/2022 0.42 0.10 - 0.90 10*3/mm3 Final     Eosinophils Absolute   Date Value Ref Range Status   10/12/2022 0.32 0.00 - 0.40 10*3/mm3 Final   09/17/2018 0.21 0.0 - 0.8 10*3/uL Final     Eosinophils, Absolute   Date Value Ref Range Status   06/14/2022 0.17 0.00 - 0.40 10*3/mm3 Final     Basophils Absolute   Date Value Ref Range Status   10/12/2022 0.08 0.00 - 0.20 10*3/mm3 Final   09/17/2018 0.11 0.0 - 0.2 10*3/uL Final     Basophils, Absolute   Date Value Ref Range Status   06/14/2022 0.08 0.00 - 0.20 10*3/mm3 Final     Immature Grans, Absolute   Date Value Ref Range Status   06/14/2022 0.01 0.00 - 0.05 10*3/mm3 Final   09/17/2018 0.01 <1 10*3/uL Final     nRBC   Date Value Ref Range Status   10/12/2022 0.0 0.0 - 0.2 /100 WBC Final   06/14/2022 0.0 0.0 - 0.2 /100 WBC Final       No results found for: HGBA1C    Lab Results   Component Value Date    [id number] 10/12/2022       TSH   Date Value Ref Range Status   10/12/2022 4.250 (H) 0.270 - 4.200 uIU/mL Final       No results found for: CHOL  No results found for: TRIG  No results found for: HDL  No results found for: LDL  No results found for: VLDL  No results found for: LDLHDL      Procedures    Assessment & Plan   Problems Addressed this Visit     Acquired  hypothyroidism    Essential hypertension    Immunization deficiency    Relevant Orders    COVID-19 Bivalent Booster (Pfizer) 12+yrs    Thrombocytosis - Primary    Relevant Orders    CBC & Differential   Diagnoses       Codes Comments    Thrombocytosis    -  Primary ICD-10-CM: D75.839  ICD-9-CM: 238.71     Essential hypertension     ICD-10-CM: I10  ICD-9-CM: 401.9     Acquired hypothyroidism     ICD-10-CM: E03.9  ICD-9-CM: 244.9     Immunization deficiency     ICD-10-CM: Z28.39  ICD-9-CM: V15.83       Thrombocytosis.  New problem.  Recheck CBC.    HTn.  Controlled.  Continue amlodipine.  CMP normal 10/12.  Hypothyroidism.  TSH okay from 10/12 with only sl elevation but not clinically significant.  Stay on same meds.    Covid booster today.      Orders Placed This Encounter   Procedures   • COVID-19 Bivalent Booster (Pfizer) 12+yrs   • CBC & Differential     Order Specific Question:   Manual Differential     Answer:   No       Current Outpatient Medications   Medication Sig Dispense Refill   • amLODIPine (NORVASC) 2.5 MG tablet 1 in am. 90 tablet 3   • aspirin 81 MG chewable tablet Chew 81 mg Daily.     • calcium carbonate (OS-PAUL) 600 MG tablet Take 600 mg by mouth Daily.     • cholecalciferol (VITAMIN D3) 25 MCG (1000 UT) tablet Take 1,000 Units by mouth Daily.     • fluticasone (FLONASE) 50 MCG/ACT nasal spray      • levothyroxine (SYNTHROID, LEVOTHROID) 75 MCG tablet One a day 90 tablet 3   • magnesium oxide (MAGOX) 400 (241.3 Mg) MG tablet tablet Take 400 mg by mouth Daily.     • meclizine (ANTIVERT) 25 MG tablet Take 1 tablet by mouth 3 (Three) Times a Day As Needed for Dizziness. 90 tablet 3   • Riboflavin (VITAMIN B2 PO) Take 1 tablet by mouth Daily.       No current facility-administered medications for this visit.       Mela Muse had no medications administered during this visit.    Return in about 5 months (around 4/22/2023).    There are no Patient Instructions on file for this visit.

## 2022-11-23 LAB
BASOPHILS # BLD AUTO: 0.11 10*3/MM3 (ref 0–0.2)
BASOPHILS NFR BLD AUTO: 2.3 % (ref 0–1.5)
EOSINOPHIL # BLD AUTO: 0.22 10*3/MM3 (ref 0–0.4)
EOSINOPHIL NFR BLD AUTO: 4.6 % (ref 0.3–6.2)
ERYTHROCYTE [DISTWIDTH] IN BLOOD BY AUTOMATED COUNT: 13.1 % (ref 12.3–15.4)
HCT VFR BLD AUTO: 38 % (ref 34–46.6)
HGB BLD-MCNC: 12.6 G/DL (ref 12–15.9)
IMM GRANULOCYTES # BLD AUTO: 0 10*3/MM3 (ref 0–0.05)
IMM GRANULOCYTES NFR BLD AUTO: 0 % (ref 0–0.5)
LYMPHOCYTES # BLD AUTO: 1.44 10*3/MM3 (ref 0.7–3.1)
LYMPHOCYTES NFR BLD AUTO: 30.1 % (ref 19.6–45.3)
MCH RBC QN AUTO: 30.4 PG (ref 26.6–33)
MCHC RBC AUTO-ENTMCNC: 33.2 G/DL (ref 31.5–35.7)
MCV RBC AUTO: 91.6 FL (ref 79–97)
MONOCYTES # BLD AUTO: 0.4 10*3/MM3 (ref 0.1–0.9)
MONOCYTES NFR BLD AUTO: 8.4 % (ref 5–12)
NEUTROPHILS # BLD AUTO: 2.62 10*3/MM3 (ref 1.7–7)
NEUTROPHILS NFR BLD AUTO: 54.6 % (ref 42.7–76)
NRBC BLD AUTO-RTO: 0 /100 WBC (ref 0–0.2)
PLATELET # BLD AUTO: 323 10*3/MM3 (ref 140–450)
RBC # BLD AUTO: 4.15 10*6/MM3 (ref 3.77–5.28)
WBC # BLD AUTO: 4.79 10*3/MM3 (ref 3.4–10.8)

## 2022-12-12 ENCOUNTER — APPOINTMENT (OUTPATIENT)
Dept: WOMENS IMAGING | Facility: HOSPITAL | Age: 87
End: 2022-12-12

## 2022-12-12 PROCEDURE — 77067 SCR MAMMO BI INCL CAD: CPT | Performed by: RADIOLOGY

## 2022-12-12 PROCEDURE — 77063 BREAST TOMOSYNTHESIS BI: CPT | Performed by: RADIOLOGY

## 2022-12-13 ENCOUNTER — OFFICE VISIT (OUTPATIENT)
Dept: ONCOLOGY | Facility: CLINIC | Age: 87
End: 2022-12-13

## 2022-12-13 ENCOUNTER — LAB (OUTPATIENT)
Dept: LAB | Facility: HOSPITAL | Age: 87
End: 2022-12-13

## 2022-12-13 VITALS
DIASTOLIC BLOOD PRESSURE: 81 MMHG | SYSTOLIC BLOOD PRESSURE: 154 MMHG | WEIGHT: 129.2 LBS | OXYGEN SATURATION: 97 % | RESPIRATION RATE: 16 BRPM | HEIGHT: 66 IN | BODY MASS INDEX: 20.76 KG/M2 | HEART RATE: 62 BPM | TEMPERATURE: 97.3 F

## 2022-12-13 DIAGNOSIS — C50.919 MALIGNANT NEOPLASM OF BREAST IN FEMALE, ESTROGEN RECEPTOR POSITIVE, UNSPECIFIED LATERALITY, UNSPECIFIED SITE OF BREAST: Primary | ICD-10-CM

## 2022-12-13 DIAGNOSIS — C50.919 MALIGNANT NEOPLASM OF BREAST IN FEMALE, ESTROGEN RECEPTOR POSITIVE, UNSPECIFIED LATERALITY, UNSPECIFIED SITE OF BREAST: ICD-10-CM

## 2022-12-13 DIAGNOSIS — Z17.0 MALIGNANT NEOPLASM OF BREAST IN FEMALE, ESTROGEN RECEPTOR POSITIVE, UNSPECIFIED LATERALITY, UNSPECIFIED SITE OF BREAST: Primary | ICD-10-CM

## 2022-12-13 DIAGNOSIS — Z17.0 MALIGNANT NEOPLASM OF BREAST IN FEMALE, ESTROGEN RECEPTOR POSITIVE, UNSPECIFIED LATERALITY, UNSPECIFIED SITE OF BREAST: ICD-10-CM

## 2022-12-13 LAB
ALBUMIN SERPL-MCNC: 4.4 G/DL (ref 3.5–5.2)
ALBUMIN/GLOB SERPL: 1.3 G/DL (ref 1.1–2.4)
ALP SERPL-CCNC: 62 U/L (ref 38–116)
ALT SERPL W P-5'-P-CCNC: 6 U/L (ref 0–33)
ANION GAP SERPL CALCULATED.3IONS-SCNC: 10.9 MMOL/L (ref 5–15)
AST SERPL-CCNC: 17 U/L (ref 0–32)
BASOPHILS # BLD AUTO: 0.09 10*3/MM3 (ref 0–0.2)
BASOPHILS NFR BLD AUTO: 1.7 % (ref 0–1.5)
BILIRUB SERPL-MCNC: 0.7 MG/DL (ref 0.2–1.2)
BUN SERPL-MCNC: 26 MG/DL (ref 6–20)
BUN/CREAT SERPL: 26 (ref 7.3–30)
CALCIUM SPEC-SCNC: 9.9 MG/DL (ref 8.5–10.2)
CHLORIDE SERPL-SCNC: 105 MMOL/L (ref 98–107)
CO2 SERPL-SCNC: 26.1 MMOL/L (ref 22–29)
CREAT SERPL-MCNC: 1 MG/DL (ref 0.6–1.1)
DEPRECATED RDW RBC AUTO: 49.4 FL (ref 37–54)
EGFRCR SERPLBLD CKD-EPI 2021: 54 ML/MIN/1.73
EOSINOPHIL # BLD AUTO: 0.16 10*3/MM3 (ref 0–0.4)
EOSINOPHIL NFR BLD AUTO: 3 % (ref 0.3–6.2)
ERYTHROCYTE [DISTWIDTH] IN BLOOD BY AUTOMATED COUNT: 14 % (ref 12.3–15.4)
GLOBULIN UR ELPH-MCNC: 3.4 GM/DL (ref 1.8–3.5)
GLUCOSE SERPL-MCNC: 108 MG/DL (ref 74–124)
HCT VFR BLD AUTO: 42.7 % (ref 34–46.6)
HGB BLD-MCNC: 13.8 G/DL (ref 12–15.9)
IMM GRANULOCYTES # BLD AUTO: 0 10*3/MM3 (ref 0–0.05)
IMM GRANULOCYTES NFR BLD AUTO: 0 % (ref 0–0.5)
LYMPHOCYTES # BLD AUTO: 1.47 10*3/MM3 (ref 0.7–3.1)
LYMPHOCYTES NFR BLD AUTO: 27.5 % (ref 19.6–45.3)
MCH RBC QN AUTO: 30.9 PG (ref 26.6–33)
MCHC RBC AUTO-ENTMCNC: 32.3 G/DL (ref 31.5–35.7)
MCV RBC AUTO: 95.5 FL (ref 79–97)
MONOCYTES # BLD AUTO: 0.37 10*3/MM3 (ref 0.1–0.9)
MONOCYTES NFR BLD AUTO: 6.9 % (ref 5–12)
NEUTROPHILS NFR BLD AUTO: 3.26 10*3/MM3 (ref 1.7–7)
NEUTROPHILS NFR BLD AUTO: 60.9 % (ref 42.7–76)
NRBC BLD AUTO-RTO: 0 /100 WBC (ref 0–0.2)
PLATELET # BLD AUTO: 288 10*3/MM3 (ref 140–450)
PMV BLD AUTO: 9.6 FL (ref 6–12)
POTASSIUM SERPL-SCNC: 4.9 MMOL/L (ref 3.5–4.7)
PROT SERPL-MCNC: 7.8 G/DL (ref 6.3–8)
RBC # BLD AUTO: 4.47 10*6/MM3 (ref 3.77–5.28)
SODIUM SERPL-SCNC: 142 MMOL/L (ref 134–145)
WBC NRBC COR # BLD: 5.35 10*3/MM3 (ref 3.4–10.8)

## 2022-12-13 PROCEDURE — 80053 COMPREHEN METABOLIC PANEL: CPT

## 2022-12-13 PROCEDURE — 36415 COLL VENOUS BLD VENIPUNCTURE: CPT

## 2022-12-13 PROCEDURE — 85025 COMPLETE CBC W/AUTO DIFF WBC: CPT

## 2022-12-13 PROCEDURE — 99213 OFFICE O/P EST LOW 20 MIN: CPT | Performed by: INTERNAL MEDICINE

## 2022-12-13 NOTE — PROGRESS NOTES
Subjective       REASON FOR FOLLOW UP:    1. pT1c pNX Invasive Lobular Carcinoma of the Left Breast measuring 20 mm, grade II.  Her tumor was ER+ 96%, NE+ 96%, and HER2/clayton Negative (0) with Ki67 of 3.34%.      2.  Patient was to start Arimidex February 2020 but has not started.  On further discussion she wants to avoid any medications including tamoxifen.    3.  Currently receiving radiation     4.  Osteopenia, had a bone density which I have reviewed with the patient.                               HISTORY OF PRESENT ILLNESS:        Patient is a 88-year-old female with history of T1c NX invasive lobular carcinoma of the left breast, 20 mm, ER NE positive HER-2/clayton negative who was to start Arimidex . Patient had refused any endocrine therapy and currently is followed with observation. She does do her self breast exam monthly. Her last screening mammogram was May 2021 which was negative. She did fall and hurt her arm and had to undergo surgery in September 2021. So she still has pain and has to undergo physical therapy.    Patient had CT chest ordered by her primary care physician because of shortness of breath which showed there was some small airspace consolidation in the right apex which was not present in 2006 but age indeterminate. It could be acute or subacute infectious infiltrate but a conservative surveillance with repeat CT suggested in 3 months. The parenchymal and pleural thickening in the anterior aspect of the left upper lobe was likely secondary to left breast radiation. Patient has follow-up appointment with pulmonary physician in the next couple weeks.    Interval history:    Patient is doing well without any complaints.  She had refused endocrine therapy given her age.  We rediscussed it today and she refuses.  She has a family history of breast cancer in a sister and grandmother.  We discussed about sending her to the genetic testing so it could be useful for her family but patient refuses.   Currently she is still doing yoga and fairly active.  She had a screening mammogram June 14, 2022 which is negative.        Oncologic history:  The patient is a 89 y.o. year old female who is here for an opinion about the above issue.    She is currently followed by PCP Dr. Yuniel Bonilla.  Patient had never had mammograms since 2013.  In October 2019 she noted indentation of the skin in the breast.  She did not have pain but she did feel a marble size lump at the time.  She had a screening mammogram in November 2019 which had revealed a 12 mm mass in the middle one-third region of the left breast at 1:00 position 4 cmfn.  She, then, went for a diagnostic mammogram and ultrasound in December.  The mammogram revealed an irregular 15 mm mass in the left breast at the 12:00 position and the ultrasound revealed an irregular [id number] mm mass in the left breast at the 12:00 position 4 cmfn and an oval elongated mass measuring 7x3x5 mm in the left breast at the 1:00 position.  She was referred to Dr. Plasencia who performed an US-guided biopsy which showed Invasive Carcinoma with Ductal and Lobular features, low grade, measuring 15 mm, ER+ 96%, NE+ 96%, HER2/clayton negative (0), Ki67 3%.  The mass at the 1:00 position was a fibroadenoma.  She underwent a left breast lumpectomy on February 5, 2020 which confirmed Invasive Lobular Carcinoma of the Left Breast measuring 20 mm, grade II.  Her tumor was ER+ 96%, NE+ 96%, and HER2/clayton Negative (0).    Her personal history consists of vertigo, skin cancer of right leg in 2008 and of left nose in 2015.  She had a melanoma in 2017.    Her family history consists of her sister who had breast cancer at age 46.  Her maternal grandmother had breast cancer in her 70s.  Her daughter has chronic lymphocytic leukemia.        Details of the diagnostics scans and procedures are as follows:    11/27/19 - Screening Mammogram  Finding 1: There is a new irregular mass measuring 12 mm with  spiculated margins seen inthe middle one-third region of the left beast at the 1:00 position located 4 cmfn.    Finding 2: There are vascular calcifications seen in both breasts.  No suspicious masses in the right breast.    12/26/19 - Diagnostic Mammogram  Finding 1: There is an irregular mass measuring 15 mm with spiculated margins in the upper outer region of the left breast 4 cmfn.      Ultrasound:  Finding 1: Irregular hypoechoic mass measuring [id number] mm at 12:00 4cmfn.    Finding 2: There is an oval elongated mass with indistinct margins measuring 7x3x5 mm in the left breast at 1:00, 1.7 cm from the mass at 12:00.    01/09/20 - US-Guided Biopsy  Left Breast 12:00 Position   Invasive Carcinoma with Ductal and Lobular features, low grade, measuring 15 mm, ER+ 96%, CO+ 96%, HER2/clayton negative (0), Ki67 3%    Left Breast 1:00 Position  Fibroadenoma    02/05/20 - Left Breast Lumpectomy   A. Invasive lobular carcinoma:                            1. Invasive carcinoma measures 20 mm x  14 mm x 10 mm.                            2. Overall [place] grade II (tubular score = 3, nuclear score = 2,                                mitotic score = 1).                            3. No lymphovascular invasion identified.                            4. Microcalcifications are present in the invasive component.               B. No carcinoma in situ component identified.               C. All margins are negative for invasive carcinoma.                    Carcinoma measures 2.0 mm from the closest (Medial) margin of excision.                     All other margins measure at least 4.0 mm from invasive carcinoma including:                            Anterior margin = 5 mm                            Posterior margin = 10 mm                            Superior margin = 20 mm                            Inferior margin =15mm                             Lateral margin = 4 mm                            Medial margin = 2 mm                   D. Rare focus of atypical lobular hyperplasia (ALH) noted, Not present at the margin. Focal fibroadenoma                    involved by invasive lobular carcinoma noted, Not present at the margin.               E. Focal biopsy site changes are identified.               F. Focal atypical ductal hyperplasia noted, not present at the margin.               G. Non-neoplastic breast tissue with intraductal papilloma, adenosis and fibrocystic change noted.                    Microcalcification noted in benign breast tissue.               H. Previous Biomarkers: Estrogen receptors: positive (95.53%), Progesterone receptors: positive (96.15%),                    HER/2-clayton: negative (0), Ki-67 = 3.34%    TUMOR   Tumor Site  Upper outer quadrant    Histologic Type  Invasive lobular carcinoma    Glandular (Acinar) / Tubular Differentiation  Score 3    Nuclear Pleomorphism  Score 2    Mitotic Rate  Score 1    Overall Grade  Grade 2 (scores of 6 or 7)    Tumor Size  Greatest dimension of largest invasive focus (Millimeters): 20 mm   Additional Dimension (Millimeters)  14 mm     10 mm   Tumor Focality  Single focus of invasive carcinoma    Ductal Carcinoma In Situ (DCIS)  Not identified    Lobular Carcinoma In Situ (LCIS)  No LCIS in specimen    Tumor Extent     Lymphovascular Invasion  Not identified    Dermal Lymphovascular Invasion  No skin present    Microcalcifications  Present in invasive carcinoma      Present in non-neoplastic tissue    Treatment Effect  No known presurgical therapy    MARGINS   Invasive Carcinoma Margins  Uninvolved by invasive carcinoma    Distance from Closest Margin (Millimeters)  5 mm   Closest Margin  Anterior    Distance from Other Margins     Anterior Margin (Millimeters)  5 mm   Posterior Margin (Millimeters)  10 mm   Superior Margin (Millimeters)  20 mm   Inferior Margin (Millimeters)  15 mm   Medial Margin (Millimeters)  8 mm   Lateral Margin (Millimeters)  10 mm   LYMPH NODES   Regional  "Lymph Nodes  No lymph nodes submitted or found    PATHOLOGIC STAGE CLASSIFICATION (pTNM, AJCC 8th Edition)   Primary Tumor (pT)  pT1c    Regional Lymph Nodes (pN)     Category (pN)  pNX    SPECIAL STUDIES   Breast Biomarker Testing Performed on Previous Biopsy     Estrogen Receptor (ER)  Positive (percentage): 95.5 %   Breast Biomarker Testing Performed on Previous Biopsy     Progesterone Receptor (PgR)  Positive (percentage): 96.2 %   Breast Biomarker Testing Performed on Previous Biopsy     HER2 (by immunohistochemistry)  Negative (Score 0)    Testing Performed on Case Number  [id number] (Not reviewed)    Comment(s)   Comment(s)  Ki67= 3.34%      Patient refused endocrine therapy    Currently on radiation          Past Medical History:   Diagnosis Date   • Anxiety    • Arthritis    • Cancer (HCC) 01/2020    left breast   • Cancer of skin of leg, right 2021   • Coronary artery disease     \"BLOCKAGE IN MY LEGS\" stents bilateral legs per pt   • COVID-19 virus infection 09/13/2021   • Hemorrhoids    • History of basal cell cancer    • History of headaches    • Humerus fracture     LEFT    • Hypertension    • Hypothyroidism    • Melanoma (HCC) 2017    LEFT LEG   • Osteopenia    • PAD (peripheral artery disease) (HCC)    • Skin cancer 2015    left nose   • Skin cancer 2008    right leg   • Urinary tract infection 10/12/22    Treated in October   • Vertigo    • Vestibular migraine 2021        Past Surgical History:   Procedure Laterality Date   • BREAST LUMPECTOMY WITH SENTINEL NODE BIOPSY Left 2/5/2020    Procedure: BREAST LUMPECTOMY WITH NEEDLE LOCALIZATION;  Surgeon: Kush Plasencia MD;  Location: [facility];  Service: General;  Laterality: Left;   • BREAST SURGERY Left 2020    BIOPSY   • COLONOSCOPY      ALL OK   • COSMETIC SURGERY      AFTER SKIN EXCISION FROM NOSE   • ENDOSCOPY     • EYE SURGERY Bilateral     CATARACTS   • FRACTURE SURGERY  9/23/21   • LEG SURGERY Left 2017    MELANOMA EXCISION-WITH SKIN " GRAFT   • ORIF HUMERUS FRACTURE Left 2021    Procedure: Open Reduction and Internal Fixation Proximal Humerus;  Surgeon: Sherman Panchal MD;  Location: [facility];  Service: Orthopedics;  Laterality: Left;   • SKIN BIOPSY     • SKIN CANCER EXCISION Bilateral     NOSE, UPPER LIP   • SKIN GRAFT Bilateral    • VASCULAR SURGERY Bilateral     STENTS IN BOTH UPPER LEGS        Current Outpatient Medications on File Prior to Visit   Medication Sig Dispense Refill   • amLODIPine (NORVASC) 2.5 MG tablet 1 in am. 90 tablet 3   • aspirin 81 MG chewable tablet Chew 81 mg Daily.     • calcium carbonate (OS-PAUL) 600 MG tablet Take 600 mg by mouth Daily.     • cholecalciferol (VITAMIN D3) 25 MCG (1000 UT) tablet Take 1,000 Units by mouth Daily.     • levothyroxine (SYNTHROID, LEVOTHROID) 75 MCG tablet One a day 90 tablet 3   • magnesium oxide (MAGOX) 400 (241.3 Mg) MG tablet tablet Take 400 mg by mouth Daily.     • meclizine (ANTIVERT) 25 MG tablet Take 1 tablet by mouth 3 (Three) Times a Day As Needed for Dizziness. 90 tablet 3   • Riboflavin (VITAMIN B2 PO) Take 1 tablet by mouth Daily.     • fluticasone (FLONASE) 50 MCG/ACT nasal spray        No current facility-administered medications on file prior to visit.        ALLERGIES:    Allergies   Allergen Reactions   • Codeine Mental Status Change        Social History     Socioeconomic History   • Marital status:      Spouse name: Vladimir   • Number of children: 4   Tobacco Use   • Smoking status: Former     Packs/day: 1.00     Years: 20.00     Pack years: 20.00     Types: Cigarettes     Quit date: 1970     Years since quittin.9   • Smokeless tobacco: Never   • Tobacco comments:     20 years. None    Vaping Use   • Vaping Use: Never used   Substance and Sexual Activity   • Alcohol use: Never   • Drug use: Never   • Sexual activity: Not Currently     Partners: Male        Family History   Problem Relation Age of Onset   • Heart failure Father  "   • Skin cancer Father    • Heart disease Father    • Breast cancer Sister 46   • Heart disease Sister    • Esophageal cancer Brother    • Heart disease Brother    • Leukemia Daughter    • Breast cancer Maternal Grandmother         70s   • Colon cancer Maternal Grandmother    • Cancer Sister         Breast   • Cancer Brother         Esophageal   • Malig Hyperthermia Neg Hx         Review of Systems   Constitutional: Negative.  Negative for appetite change, chills, diaphoresis, fatigue, fever and unexpected weight change.   HENT: Negative for hearing loss, sore throat and trouble swallowing.    Eyes: Negative.    Respiratory: Positive for shortness of breath. Negative for cough, chest tightness and wheezing.    Cardiovascular: Negative.  Negative for chest pain, palpitations and leg swelling.   Gastrointestinal: Negative.  Negative for abdominal distention, abdominal pain, constipation, diarrhea, nausea and vomiting.   Endocrine: Negative.    Genitourinary: Negative.  Negative for dysuria, frequency, hematuria and urgency.   Musculoskeletal: Negative.  Negative for joint swelling.        No muscle weakness.   Skin: Negative.  Negative for rash and wound.   Allergic/Immunologic: Negative.    Neurological: Negative for seizures, syncope, speech difficulty, weakness and numbness. Dizziness: 11/24/20-Unchanged. Headaches: 11/24/20-Unchanged.   Hematological: Negative.  Negative for adenopathy. Does not bruise/bleed easily.   Psychiatric/Behavioral: Negative.  Negative for behavioral problems, confusion and suicidal ideas.   All other systems reviewed and are negative.  Patient has vertigo stable  I  I have reviewed and confirmed the accuracy of the ROS as documented by the MA/LPN/RN Sowmya Faulkner MD        Objective     Vitals:    12/13/22 1358   BP: 154/81   Pulse: 62   Resp: 16   Temp: 97.3 °F (36.3 °C)   TempSrc: Temporal   SpO2: 97%   Weight: 58.6 kg (129 lb 3.2 oz)   Height: 167.6 cm (65.98\")   PainSc: 0-No pain "     Current Status 12/13/2022   ECOG score 0     Physical examination      This patient's ACP documentation is up to date, and there's nothing further left to document.      CONSTITUTIONAL:  Vital signs reviewed.  No distress, looks comfortable.  BREAST: Right breast: No skin changes, no evidence of breast mass, no nipple discharge, no evidence of any right axillary adenopathy or right supraclavicular adenopathy  Left breast: No evidence of any skin changes, no evidence of any left breast mass and no evidence of left nipple discharge as well as no left axillary adenopathy or left supraclavicular adenopathy.  RESPIRATORY:  Normal respiratory effort.  Lungs clear to auscultation bilaterally.  CARDIOVASCULAR:  Normal S1, S2.  No murmurs rubs or gallops.  No significant lower extremity edema.  GASTROINTESTINAL: Abdomen appears unremarkable.  Nontender.  No hepatomegaly.  No splenomegaly.  LYMPHATIC:  No cervical, supraclavicular, axillary lymphadenopathy.  SKIN:  Warm.  No rashes.  PSYCHIATRIC:  Normal judgment and insight.  Normal mood and affect.      I have reexamined the patient and the results are consistent with the previously documented exam. Sowmya Faulkner MD         RECENT LABS:  Hematology WBC   Date Value Ref Range Status   12/13/2022 5.35 3.40 - 10.80 10*3/mm3 Final   11/22/2022 4.79 3.40 - 10.80 10*3/mm3 Final   09/17/2018 6.22 4.5 - 11.0 10*3/uL Final     RBC   Date Value Ref Range Status   12/13/2022 4.47 3.77 - 5.28 10*6/mm3 Final   11/22/2022 4.15 3.77 - 5.28 10*6/mm3 Final   09/17/2018 4.34 4.0 - 5.2 10*6/uL Final     Hemoglobin   Date Value Ref Range Status   12/13/2022 13.8 12.0 - 15.9 g/dL Final   09/17/2018 13.2 12.0 - 16.0 g/dL Final     Hematocrit   Date Value Ref Range Status   12/13/2022 42.7 34.0 - 46.6 % Final   09/17/2018 41.3 36.0 - 46.0 % Final     Platelets   Date Value Ref Range Status   12/13/2022 288 140 - 450 10*3/mm3 Final   09/17/2018 510 (T) 720 - 609 10*3/uL Final           Assessment & Plan     1. pT1c pNX Invasive Lobular Carcinoma of the Left Breast measuring 20 mm, grade II.  Her tumor was ER+ 96%, NC+ 96%, and HER2/clayton Negative (0) with Ki67 of 3.34%.    -Screening mammogram, 12 mm mass, spiculated margins left breast, 1 o'clock position, 4 cm from the nipple  -Diagnostic mammogram, irregular 15 mm mass left breast , 4 cm from the nipple  -Ultrasound showed irregular mass 15 x 13 x 13 at 12:00, oval mass with indistinct margin 7 x 3 x 5 mm left breast at 1:00  -Left breast ultrasound-guided biopsy, 12 o'clock position, invasive carcinoma with ductal and lobular features  -left breast C, 1 o'clock position, fibroadenoma  -February 5, 2020, left breast lumpectomy, invasive lobular carcinoma, 20 x 14 x 10 mm, grade 2, [employer] score of 5 no lymphovascular space invasion, no evidence of DCIS, margins are all negative, rare focus of atypical lobular hyperplasia, not present at margin,    · I had a lengthy discussion with the patient and family that at the age of 86 years given that this is highly ER NC positive tumor, HER-2 negative, she is not a candidate for any chemotherapy given her age.  I do not see any need to obtain an Oncotype DX in the setting.  She is certainly a candidate for endocrine therapy.  My question would be if sentinel lymph node has to be considered given that she had a 20 mm invasive lobular carcinoma.  However patient is being seen by radiation oncology and will get input from them as well to see if axilla would be included.  Will discuss with Dr. Plasencia.  · Patient to consult radiation oncology for opinion on 03/03/20.  Given patient's cancer was ER/NC+, she will certainly benefit from endocrine therapy.  Given her past medical history and current health, I think Arimidex would be the best choice of treatment.  Patient has refused to undergo any endocrine therapy after explaining the details of the side effects.  She is to follow-up with radiation  oncology and so far 17 out of 21 treatments given  Patient has refused endocrine therapy even after discussing in length.  December 13, 2022: Reviewed mammogram from June 2022 and its negative.  Discussed again about endocrine therapy as well as genetic testing both of which she refused    2. Family history of malignancy.  Her family history consists of her sister who had breast cancer at age 46.  Her maternal grandmother had breast cancer in her 70s.  Her daughter has chronic lymphocytic leukemia.  · Genetic testing could be considered to see if that information benefits the rest of the family.        3. History of skin cancer in the right leg (2008) and left nose (2015).    4. History of Melanoma of the left leg (2017).  Patient to follow with dermatology    5. Vertigo.  Patient reports feeling dizzy constantly.  Stable  6. Osteopenia: Patient to take calcium and vitamin D supplements.  Discussed either oral bisphosphonates like Actonel or Fosamax or Prolia injectable but patient refuses  · Last bone density March 2020 and she is not due till March 2022. She has osteopenia but refused any bisphosphonates    7. Fatigue since radiation likely related to radiation  8. Chronic bilateral parenchymal infiltrates,            PLAN:  · Reviewed labs today which is normal  · Reviewed mammogram from June 2022 which is negative  · Patient has refused both genetic testing and endocrine therapy given her age  · Follow-up with me in 6 months with lab  Sowmya Faulkner MD       Cc:  MD Sima Costa MD Stephen Self, MD Stuart Spalding, MD

## 2023-01-20 ENCOUNTER — TELEPHONE (OUTPATIENT)
Dept: FAMILY MEDICINE CLINIC | Facility: CLINIC | Age: 88
End: 2023-01-20

## 2023-01-20 NOTE — TELEPHONE ENCOUNTER
Caller: TONY PADILLA    Relationship: Emergency Contact     Best call back number:[phone]    What is the medical concern/diagnosis: VERTIGO    What specialty or service is being requested: PHYSICAL THERAPY TREATMENT [facility]    What is the provider, practice or medical service name: [facility]    What is the office location:[address] [place] [address] NEXT TO [facility]    What is the office phone number: [phone]

## 2023-01-23 DIAGNOSIS — R42 VERTIGO: Primary | ICD-10-CM

## 2023-03-27 DIAGNOSIS — E03.9 ACQUIRED HYPOTHYROIDISM: ICD-10-CM

## 2023-03-27 RX ORDER — LEVOTHYROXINE SODIUM 0.07 MG/1
TABLET ORAL
Qty: 90 TABLET | Refills: 3 | Status: SHIPPED | OUTPATIENT
Start: 2023-03-27

## 2023-03-27 NOTE — TELEPHONE ENCOUNTER
Caller: TONY PADILLA    Relationship: Emergency Contact    Best call back number: [phone]    Requested Prescriptions:   Requested Prescriptions     Pending Prescriptions Disp Refills   • levothyroxine (SYNTHROID, LEVOTHROID) 75 MCG tablet 90 tablet 3     Sig: One a day        Pharmacy where request should be sent: [id number] [address]  [facility] - [phone]  - [phone] FX     Last office visit with prescribing clinician: 11/22/2022   Last telemedicine visit with prescribing clinician: 4/18/2023   Next office visit with prescribing clinician: 4/18/2023     Additional details provided by patient:     Does the patient have less than a 3 day supply:  [x] Yes  [] No    Would you like a call back once the refill request has been completed: [] Yes [] No    If the office needs to give you a call back, can they leave a voicemail: [] Yes [] No    Brian Ambriz Rep   03/27/23 11:02 EDT         D

## 2023-04-18 ENCOUNTER — OFFICE VISIT (OUTPATIENT)
Dept: FAMILY MEDICINE CLINIC | Facility: CLINIC | Age: 88
End: 2023-04-18
Payer: MEDICARE

## 2023-04-18 VITALS
DIASTOLIC BLOOD PRESSURE: 64 MMHG | SYSTOLIC BLOOD PRESSURE: 130 MMHG | OXYGEN SATURATION: 97 % | BODY MASS INDEX: 21.38 KG/M2 | WEIGHT: 133 LBS | HEART RATE: 61 BPM | HEIGHT: 66 IN | TEMPERATURE: 98.6 F

## 2023-04-18 DIAGNOSIS — I10 ESSENTIAL HYPERTENSION: ICD-10-CM

## 2023-04-18 DIAGNOSIS — E03.9 ACQUIRED HYPOTHYROIDISM: ICD-10-CM

## 2023-04-18 PROCEDURE — 99214 OFFICE O/P EST MOD 30 MIN: CPT | Performed by: FAMILY MEDICINE

## 2023-04-18 RX ORDER — TRAMADOL HYDROCHLORIDE 50 MG/1
TABLET ORAL
COMMUNITY
Start: 2023-01-13 | End: 2023-04-18

## 2023-04-18 RX ORDER — CEPHALEXIN 500 MG/1
CAPSULE ORAL
COMMUNITY
Start: 2023-01-13 | End: 2023-04-18

## 2023-04-18 RX ORDER — AMLODIPINE BESYLATE 2.5 MG/1
TABLET ORAL
Qty: 90 TABLET | Refills: 3 | Status: SHIPPED | OUTPATIENT
Start: 2023-04-18

## 2023-04-18 NOTE — PROGRESS NOTES
Chief Complaint   Patient presents with   • Hypertension       Subjective   Mela Muse is a 89 y.o. female.     History of Present Illness   F/U HTN.  Doing wel lwith meds.    F/U hypothyroidism.  Doing well with med daily.      The following portions of the patient's history were reviewed and updated as appropriate: allergies, current medications, past family history, past medical history, past social history, past surgical history and problem list.    Review of Systems   Constitutional: Negative for appetite change and fatigue.   HENT: Negative for nosebleeds and sore throat.    Eyes: Negative for blurred vision and visual disturbance.   Respiratory: Negative for shortness of breath and wheezing.    Cardiovascular: Negative for chest pain and leg swelling.   Gastrointestinal: Negative for abdominal distention and abdominal pain.   Endocrine: Negative for cold intolerance and polyuria.   Genitourinary: Negative for dysuria and hematuria.   Musculoskeletal: Negative for arthralgias and myalgias.   Skin: Negative for color change and rash.   Neurological: Negative for weakness and confusion.   Psychiatric/Behavioral: Negative for agitation and depressed mood.       Patient Active Problem List   Diagnosis   • Malignant neoplasm of breast in female, estrogen receptor positive   • Vertigo   • History of skin cancer   • History of melanoma   • Osteoporosis   • Post-menopausal   • Dizziness   • Essential hypertension   • PAD (peripheral artery disease)   • Claudication of both lower extremities   • Asymptomatic bilateral carotid artery stenosis   • Osteopenia of hip   • Other fatigue   • Medicare annual wellness visit, subsequent   • Acquired hypothyroidism   • Left-sided chest pain   • COVID-19   • Closed fracture of left proximal humerus   • Shortness of breath   • Left upper lobe pulmonary infiltrate   • COPD (chronic obstructive pulmonary disease)   • Dysuria   • Fatigue   • Sequelae of protein-calorie  "malnutrition   • Vitamin D deficiency, unspecified   • Abdominal pain   • Thrombocytosis   • Immunization deficiency       Allergies   Allergen Reactions   • Codeine Mental Status Change         Current Outpatient Medications:   •  amLODIPine (NORVASC) 2.5 MG tablet, 1 in am., Disp: 90 tablet, Rfl: 3  •  aspirin 81 MG chewable tablet, Chew 1 tablet Daily., Disp: , Rfl:   •  calcium carbonate (OS-PAUL) 600 MG tablet, Take 1 tablet by mouth Daily., Disp: , Rfl:   •  cholecalciferol (VITAMIN D3) 25 MCG (1000 UT) tablet, Take 1 tablet by mouth Daily., Disp: , Rfl:   •  levothyroxine (SYNTHROID, LEVOTHROID) 75 MCG tablet, One a day, Disp: 90 tablet, Rfl: 3  •  magnesium oxide (MAGOX) 400 (241.3 Mg) MG tablet tablet, Take 1 tablet by mouth Daily., Disp: , Rfl:   •  meclizine (ANTIVERT) 25 MG tablet, Take 1 tablet by mouth 3 (Three) Times a Day As Needed for Dizziness., Disp: 90 tablet, Rfl: 3  •  Riboflavin (VITAMIN B2 PO), Take 1 tablet by mouth Daily., Disp: , Rfl:     Past Medical History:   Diagnosis Date   • Anxiety    • Arthritis    • Cancer 01/2020    left breast   • Cancer of skin of leg, right 2021   • Coronary artery disease     \"BLOCKAGE IN MY LEGS\" stents bilateral legs per pt   • COVID-19 virus infection 09/13/2021   • Hemorrhoids    • History of basal cell cancer    • History of headaches    • Humerus fracture     LEFT    • Hypertension    • Hypothyroidism    • Melanoma 2017    LEFT LEG   • Osteopenia    • PAD (peripheral artery disease)    • Skin cancer 2015    left nose   • Skin cancer 2008    right leg   • Urinary tract infection 10/12/22    Treated in October   • Vertigo    • Vestibular migraine 2021       Past Surgical History:   Procedure Laterality Date   • BREAST LUMPECTOMY WITH SENTINEL NODE BIOPSY Left 2/5/2020    Procedure: BREAST LUMPECTOMY WITH NEEDLE LOCALIZATION;  Surgeon: Kush Plasencia MD;  Location: [facility];  Service: General;  Laterality: Left;   • BREAST SURGERY Left 2020    " BIOPSY   • COLONOSCOPY      ALL OK   • COSMETIC SURGERY      AFTER SKIN EXCISION FROM NOSE   • ENDOSCOPY     • EYE SURGERY Bilateral     CATARACTS   • FRACTURE SURGERY  21   • LEG SURGERY Left 2017    MELANOMA EXCISION-WITH SKIN GRAFT   • ORIF HUMERUS FRACTURE Left 2021    Procedure: Open Reduction and Internal Fixation Proximal Humerus;  Surgeon: Sherman Panchal MD;  Location: [facility] OR;  Service: Orthopedics;  Laterality: Left;   • SKIN BIOPSY     • SKIN CANCER EXCISION Bilateral     NOSE, UPPER LIP   • SKIN GRAFT Bilateral    • VASCULAR SURGERY Bilateral 2018    STENTS IN BOTH UPPER LEGS       Family History   Problem Relation Age of Onset   • Heart failure Father    • Skin cancer Father    • Heart disease Father    • Breast cancer Sister 46   • Heart disease Sister    • Esophageal cancer Brother    • Heart disease Brother    • Leukemia Daughter    • Breast cancer Maternal Grandmother         70s   • Colon cancer Maternal Grandmother    • Cancer Sister         Breast   • Cancer Brother         Esophageal   • Malig Hyperthermia Neg Hx        Social History     Tobacco Use   • Smoking status: Former     Packs/day: 1.00     Years: 20.00     Pack years: 20.00     Types: Cigarettes     Quit date: 1970     Years since quittin.3   • Smokeless tobacco: Never   • Tobacco comments:     20 years. None    Substance Use Topics   • Alcohol use: Never            Objective     Vitals:    23 0923   BP: 130/64   Pulse: 61   Temp: 98.6 °F (37 °C)   SpO2: 97%     Body mass index is 21.48 kg/m².    Physical Exam  Vitals reviewed.   Constitutional:       Appearance: She is well-developed. She is not diaphoretic.   HENT:      Head: Normocephalic and atraumatic.   Eyes:      General: No scleral icterus.     Pupils: Pupils are equal, round, and reactive to light.   Neck:      Thyroid: No thyromegaly.   Cardiovascular:      Rate and Rhythm: Normal rate and regular rhythm.      Heart sounds: No  murmur heard.    No friction rub. No gallop.   Pulmonary:      Effort: Pulmonary effort is normal. No respiratory distress.      Breath sounds: No wheezing or rales.   Chest:      Chest wall: No tenderness.   Abdominal:      General: Bowel sounds are normal. There is no distension.      Palpations: Abdomen is soft.      Tenderness: There is no abdominal tenderness.   Musculoskeletal:         General: No deformity. Normal range of motion.   Lymphadenopathy:      Cervical: No cervical adenopathy.   Skin:     General: Skin is warm and dry.      Findings: No rash.   Neurological:      Cranial Nerves: No cranial nerve deficit.      Motor: No abnormal muscle tone.         Lab Results   Component Value Date    GLUCOSE 108 12/13/2022    BUN 26 (H) 12/13/2022    CREATININE 1.00 12/13/2022    EGFRIFNONA 51 (L) 12/14/2021    EGFRIFAFRI 63 10/21/2021    BCR 26.0 12/13/2022    K 4.9 (H) 12/13/2022    CO2 26.1 12/13/2022    CALCIUM 9.9 12/13/2022    PROTENTOTREF 6.3 10/12/2022    ALBUMIN 4.40 12/13/2022    LABIL2 1.4 10/12/2022    AST 17 12/13/2022    ALT 6 12/13/2022       WBC   Date Value Ref Range Status   12/13/2022 5.35 3.40 - 10.80 10*3/mm3 Final   11/22/2022 4.79 3.40 - 10.80 10*3/mm3 Final   09/17/2018 6.22 4.5 - 11.0 10*3/uL Final     RBC   Date Value Ref Range Status   12/13/2022 4.47 3.77 - 5.28 10*6/mm3 Final   11/22/2022 4.15 3.77 - 5.28 10*6/mm3 Final   09/17/2018 4.34 4.0 - 5.2 10*6/uL Final     Hemoglobin   Date Value Ref Range Status   12/13/2022 13.8 12.0 - 15.9 g/dL Final   09/17/2018 13.2 12.0 - 16.0 g/dL Final     Hematocrit   Date Value Ref Range Status   12/13/2022 42.7 34.0 - 46.6 % Final   09/17/2018 41.3 36.0 - 46.0 % Final     MCV   Date Value Ref Range Status   12/13/2022 95.5 79.0 - 97.0 fL Final   09/17/2018 95.2 80.0 - 100.0 fL Final     MCH   Date Value Ref Range Status   12/13/2022 30.9 26.6 - 33.0 pg Final   09/17/2018 30.4 26.0 - 34.0 pg Final     MCHC   Date Value Ref Range Status   12/13/2022  32.3 31.5 - 35.7 g/dL Final   09/17/2018 32.0 31.0 - 37.0 g/dL Final     RDW   Date Value Ref Range Status   12/13/2022 14.0 12.3 - 15.4 % Final   09/17/2018 13.7 12.0 - 16.8 % Final     RDW-SD   Date Value Ref Range Status   12/13/2022 49.4 37.0 - 54.0 fl Final     MPV   Date Value Ref Range Status   12/13/2022 9.6 6.0 - 12.0 fL Final   09/17/2018 9.6 6.7 - 10.8 fL Final     Platelets   Date Value Ref Range Status   12/13/2022 288 140 - 450 10*3/mm3 Final   09/17/2018 457 (H) 140 - 440 10*3/uL Final     Neutrophil Rel %   Date Value Ref Range Status   09/17/2018 62.9 45 - 80 % Final     Neutrophil %   Date Value Ref Range Status   12/13/2022 60.9 42.7 - 76.0 % Final     Lymphocyte Rel %   Date Value Ref Range Status   09/17/2018 23.0 15 - 50 % Final     Lymphocyte %   Date Value Ref Range Status   12/13/2022 27.5 19.6 - 45.3 % Final     Monocyte Rel %   Date Value Ref Range Status   09/17/2018 8.7 0 - 15 % Final     Monocyte %   Date Value Ref Range Status   12/13/2022 6.9 5.0 - 12.0 % Final     Eosinophil %   Date Value Ref Range Status   12/13/2022 3.0 0.3 - 6.2 % Final   09/17/2018 3.4 0 - 7 % Final     Basophil Rel %   Date Value Ref Range Status   09/17/2018 1.8 0 - 2 % Final     Basophil %   Date Value Ref Range Status   12/13/2022 1.7 (H) 0.0 - 1.5 % Final     Immature Grans %   Date Value Ref Range Status   12/13/2022 0.0 0.0 - 0.5 % Final   09/17/2018 0.2 (H) 0 % Final     Neutrophils Absolute   Date Value Ref Range Status   09/17/2018 3.92 2.0 - 8.8 10*3/uL Final     Neutrophils, Absolute   Date Value Ref Range Status   12/13/2022 3.26 1.70 - 7.00 10*3/mm3 Final     Lymphocytes Absolute   Date Value Ref Range Status   09/17/2018 1.43 0.7 - 5.5 10*3/uL Final     Lymphocytes, Absolute   Date Value Ref Range Status   12/13/2022 1.47 0.70 - 3.10 10*3/mm3 Final     Monocytes Absolute   Date Value Ref Range Status   09/17/2018 0.54 0.0 - 1.7 10*3/uL Final     Monocytes, Absolute   Date Value Ref Range Status    12/13/2022 0.37 0.10 - 0.90 10*3/mm3 Final     Eosinophils Absolute   Date Value Ref Range Status   09/17/2018 0.21 0.0 - 0.8 10*3/uL Final     Eosinophils, Absolute   Date Value Ref Range Status   12/13/2022 0.16 0.00 - 0.40 10*3/mm3 Final     Basophils Absolute   Date Value Ref Range Status   09/17/2018 0.11 0.0 - 0.2 10*3/uL Final     Basophils, Absolute   Date Value Ref Range Status   12/13/2022 0.09 0.00 - 0.20 10*3/mm3 Final     Immature Grans, Absolute   Date Value Ref Range Status   12/13/2022 0.00 0.00 - 0.05 10*3/mm3 Final   09/17/2018 0.01 <1 10*3/uL Final     nRBC   Date Value Ref Range Status   12/13/2022 0.0 0.0 - 0.2 /100 WBC Final       No results found for: HGBA1C    Lab Results   Component Value Date    LGPATLTN50 867 10/12/2022       TSH   Date Value Ref Range Status   10/12/2022 4.250 (H) 0.270 - 4.200 uIU/mL Final       No results found for: CHOL  No results found for: TRIG  No results found for: HDL  No results found for: LDL  No results found for: VLDL  No results found for: LDLHDL      Procedures    Assessment & Plan   Problems Addressed this Visit     Acquired hypothyroidism    Essential hypertension    Relevant Medications    amLODIPine (NORVASC) 2.5 MG tablet   Diagnoses       Codes Comments    Acquired hypothyroidism     ICD-10-CM: E03.9  ICD-9-CM: 244.9     Essential hypertension     ICD-10-CM: I10  ICD-9-CM: 401.9       HTn.  Controlled.  Continue amlo 2.5 a day.    Hypothyroidism . Contorlled.  Continue levothyroxine 75  A day.     No orders of the defined types were placed in this encounter.      Current Outpatient Medications   Medication Sig Dispense Refill   • aspirin 81 MG chewable tablet Chew 1 tablet Daily.     • calcium carbonate (OS-PAUL) 600 MG tablet Take 1 tablet by mouth Daily.     • cholecalciferol (VITAMIN D3) 25 MCG (1000 UT) tablet Take 1 tablet by mouth Daily.     • levothyroxine (SYNTHROID, LEVOTHROID) 75 MCG tablet One a day 90 tablet 3   • magnesium oxide (MAGOX)  400 (241.3 Mg) MG tablet tablet Take 1 tablet by mouth Daily.     • meclizine (ANTIVERT) 25 MG tablet Take 1 tablet by mouth 3 (Three) Times a Day As Needed for Dizziness. 90 tablet 3   • Riboflavin (VITAMIN B2 PO) Take 1 tablet by mouth Daily.     • amLODIPine (NORVASC) 2.5 MG tablet 1 in am. 90 tablet 3     No current facility-administered medications for this visit.       Mela Muse had no medications administered during this visit.    Return in about 6 months (around 10/18/2023).    There are no Patient Instructions on file for this visit.

## 2023-06-20 PROBLEM — Z12.31 SCREENING MAMMOGRAM, ENCOUNTER FOR: Status: ACTIVE | Noted: 2021-04-08

## 2023-10-17 ENCOUNTER — OFFICE VISIT (OUTPATIENT)
Dept: FAMILY MEDICINE CLINIC | Facility: CLINIC | Age: 88
End: 2023-10-17
Payer: MEDICARE

## 2023-10-17 VITALS
SYSTOLIC BLOOD PRESSURE: 132 MMHG | TEMPERATURE: 98.6 F | OXYGEN SATURATION: 98 % | DIASTOLIC BLOOD PRESSURE: 78 MMHG | HEIGHT: 66 IN | HEART RATE: 62 BPM | BODY MASS INDEX: 20.44 KG/M2 | WEIGHT: 127.2 LBS

## 2023-10-17 DIAGNOSIS — R53.82 CHRONIC FATIGUE: ICD-10-CM

## 2023-10-17 DIAGNOSIS — Z13.6 ENCOUNTER FOR LIPID SCREENING FOR CARDIOVASCULAR DISEASE: ICD-10-CM

## 2023-10-17 DIAGNOSIS — Z13.220 ENCOUNTER FOR LIPID SCREENING FOR CARDIOVASCULAR DISEASE: ICD-10-CM

## 2023-10-17 DIAGNOSIS — Z23 ENCOUNTER FOR IMMUNIZATION: ICD-10-CM

## 2023-10-17 DIAGNOSIS — Z00.00 MEDICARE ANNUAL WELLNESS VISIT, SUBSEQUENT: Primary | ICD-10-CM

## 2023-10-17 NOTE — PROGRESS NOTES
The ABCs of the Annual Wellness Visit  Subsequent Medicare Wellness Visit    Subjective    Mela Muse is a 89 y.o. female who presents for a Subsequent Medicare Wellness Visit.    The following portions of the patient's history were reviewed and   updated as appropriate: allergies, current medications, past family history, past medical history, past social history, past surgical history, and problem list.    Compared to one year ago, the patient feels her physical   health is the same.    Compared to one year ago, the patient feels her mental   health is the same.    Recent Hospitalizations:  She was not admitted to the hospital during the last year.       Current Medical Providers:  Patient Care Team:  Yuniel Bonilla MD as PCP - General  Yuniel Bonilla MD as PCP - Family Medicine  Self, Khoa Quick MD (Vascular Surgery)  Sima Joiner MD as Consulting Physician (Radiation Oncology)  Sowmya Faulkner MD as Consulting Physician (Hematology and Oncology)  Kush Plasencia MD as Referring Physician (Breast Surgery)    Outpatient Medications Prior to Visit   Medication Sig Dispense Refill    amLODIPine (NORVASC) 2.5 MG tablet 1 in am. 90 tablet 3    aspirin 81 MG chewable tablet Chew 1 tablet Daily.      calcium carbonate (OS-PAUL) 600 MG tablet Take 1 tablet by mouth Daily.      cholecalciferol (VITAMIN D3) 25 MCG (1000 UT) tablet Take 1 tablet by mouth Daily.      levothyroxine (SYNTHROID, LEVOTHROID) 75 MCG tablet One a day 90 tablet 3    magnesium oxide (MAGOX) 400 (241.3 Mg) MG tablet tablet Take 1 tablet by mouth Daily.      meclizine (ANTIVERT) 25 MG tablet Take 1 tablet by mouth 3 (Three) Times a Day As Needed for Dizziness. 90 tablet 3    Riboflavin (VITAMIN B2 PO) Take 1 tablet by mouth Daily.       No facility-administered medications prior to visit.       No opioid medication identified on active medication list. I have reviewed chart for other potential  high risk medication/s  "and harmful drug interactions in the elderly.        Aspirin is on active medication list. Aspirin use is indicated based on review of current medical condition/s. Pros and cons of this therapy have been discussed today. Benefits of this medication outweigh potential harm.  Patient has been encouraged to continue taking this medication.  .      Patient Active Problem List   Diagnosis    Malignant neoplasm of breast in female, estrogen receptor positive    Vertigo    History of skin cancer    History of melanoma    Osteoporosis    Post-menopausal    Dizziness    Essential hypertension    PAD (peripheral artery disease)    Claudication of both lower extremities    Asymptomatic bilateral carotid artery stenosis    Osteopenia of hip    Other fatigue    Medicare annual wellness visit, subsequent    Acquired hypothyroidism    Left-sided chest pain    COVID-19    Closed fracture of left proximal humerus    Shortness of breath    Left upper lobe pulmonary infiltrate    COPD (chronic obstructive pulmonary disease)    Dysuria    Chronic fatigue    Sequelae of protein-calorie malnutrition    Vitamin D deficiency, unspecified    Abdominal pain    Thrombocytosis    Immunization deficiency    Encounter for immunization     Advance Care Planning   Advance Care Planning     Advance Directive is on file.  ACP discussion was held with the patient during this visit. Patient has an advance directive in EMR which is still valid.      Objective    Vitals:    10/17/23 0904   BP: 132/78   BP Location: Left arm   Patient Position: Sitting   Cuff Size: Adult   Pulse: 62   Temp: 98.6 °F (37 °C)   SpO2: 98%   Weight: 57.7 kg (127 lb 3.2 oz)   Height: 167.6 cm (65.98\")     Estimated body mass index is 20.54 kg/m² as calculated from the following:    Height as of this encounter: 167.6 cm (65.98\").    Weight as of this encounter: 57.7 kg (127 lb 3.2 oz).    BMI is within normal parameters. No other follow-up for BMI required.      Does the " patient have evidence of cognitive impairment? No          HEALTH RISK ASSESSMENT    Smoking Status:  Social History     Tobacco Use   Smoking Status Former    Packs/day: 1.00    Years: 20.00    Additional pack years: 0.00    Total pack years: 20.00    Types: Cigarettes    Quit date: 1970    Years since quittin.8   Smokeless Tobacco Never   Tobacco Comments    20 years. None      Alcohol Consumption:  Social History     Substance and Sexual Activity   Alcohol Use Never     Fall Risk Screen:    STEADI Fall Risk Assessment has not been completed.    Depression Screening:      10/17/2023     9:00 AM   PHQ-2/PHQ-9 Depression Screening   Little Interest or Pleasure in Doing Things 0-->not at all   PHQ-9: Brief Depression Severity Measure Score 0       Health Habits and Functional and Cognitive Screening:      10/17/2023     9:00 AM   Functional & Cognitive Status   Do you have difficulty preparing food and eating? No   Do you have difficulty bathing yourself, getting dressed or grooming yourself? No   Do you have difficulty using the toilet? No   Do you have difficulty moving around from place to place? No   Do you have trouble with steps or getting out of a bed or a chair? No   Current Diet Well Balanced Diet   Dental Exam Up to date   Eye Exam Up to date   Exercise (times per week) 5 times per week   Current Exercises Include No Regular Exercise   Do you need help using the phone?  No   Are you deaf or do you have serious difficulty hearing?  No   Do you need help to go to places out of walking distance? No   Do you need help shopping? No   Do you need help preparing meals?  No   Do you need help with housework?  No   Do you need help with laundry? No   Do you need help taking your medications? No   Do you need help managing money? No   Do you ever drive or ride in a car without wearing a seat belt? No   Have you felt unusual stress, anger or loneliness in the last month? No   Who do you live with? Spouse    If you need help, do you have trouble finding someone available to you? No   Do you have difficulty concentrating, remembering or making decisions? No       Age-appropriate Screening Schedule:  Refer to the list below for future screening recommendations based on patient's age, sex and/or medical conditions. Orders for these recommended tests are listed in the plan section. The patient has been provided with a written plan.    Health Maintenance   Topic Date Due    TDAP/TD VACCINES (1 - Tdap) Never done    ZOSTER VACCINE (1 of 2) Never done    INFLUENZA VACCINE  08/01/2023    COVID-19 Vaccine (6 - 2023-24 season) 09/01/2023    MAMMOGRAM  12/12/2023    DXA SCAN  05/31/2024    ANNUAL WELLNESS VISIT  10/17/2024    Pneumococcal Vaccine 65+  Completed                  CMS Preventative Services Quick Reference  Risk Factors Identified During Encounter  Inactivity/Sedentary: Patient was advised to exercise at least 150 minutes a week per CDC recommendations.  The above risks/problems have been discussed with the patient.  Pertinent information has been shared with the patient in the After Visit Summary.  An After Visit Summary and PPPS were made available to the patient.    Follow Up:   Next Medicare Wellness visit to be scheduled in 1 year.       Additional E&M Note during same encounter follows:  Patient has multiple medical problems which are significant and separately identifiable that require additional work above and beyond the Medicare Wellness Visit.      Chief Complaint  Medicare Wellness-subsequent    Subjective        HPI  Mela Muse is also being seen today for fatigue.  Started with covid illness 9/2020. No better.      Review of Systems   Constitutional:  Positive for fatigue. Negative for chills and diaphoresis.   HENT:  Negative for rhinorrhea.    Respiratory:  Negative for cough and shortness of breath.    Cardiovascular:  Negative for chest pain and leg swelling.   Gastrointestinal:  Negative for  "abdominal distention and abdominal pain.   Musculoskeletal:  Negative for arthralgias and back pain.   Skin:  Negative for rash.       Objective   Vital Signs:  /78 (BP Location: Left arm, Patient Position: Sitting, Cuff Size: Adult)   Pulse 62   Temp 98.6 °F (37 °C)   Ht 167.6 cm (65.98\")   Wt 57.7 kg (127 lb 3.2 oz)   SpO2 98%   BMI 20.54 kg/m²     Physical Exam  Vitals reviewed.   Constitutional:       Appearance: She is well-developed. She is not diaphoretic.   HENT:      Head: Normocephalic and atraumatic.   Eyes:      General: No scleral icterus.     Pupils: Pupils are equal, round, and reactive to light.   Neck:      Thyroid: No thyromegaly.   Cardiovascular:      Rate and Rhythm: Normal rate and regular rhythm.      Heart sounds: No murmur heard.     No friction rub. No gallop.   Pulmonary:      Effort: Pulmonary effort is normal. No respiratory distress.      Breath sounds: No wheezing or rales.   Chest:      Chest wall: No tenderness.   Abdominal:      General: Bowel sounds are normal. There is no distension.      Palpations: Abdomen is soft.      Tenderness: There is no abdominal tenderness.   Musculoskeletal:         General: No deformity. Normal range of motion.   Lymphadenopathy:      Cervical: No cervical adenopathy.   Skin:     General: Skin is warm and dry.      Findings: No rash.   Neurological:      Cranial Nerves: No cranial nerve deficit.      Motor: No abnormal muscle tone.                         Assessment and Plan   Diagnoses and all orders for this visit:    1. Medicare annual wellness visit, subsequent (Primary)    2. Chronic fatigue    3. Encounter for immunization        Chronic fatigue.  Uncontrolled.  Check CMP, CBC, B12, TSH.    Preventive Counseling:  Encouraged to stay active.  Covid vaccine/ flu today.  Pneumovax UTD.  Check FLP.    Dentist UTD.  Optho UTD.           Follow Up   No follow-ups on file.  Patient was given instructions and counseling regarding her " condition or for health maintenance advice. Please see specific information pulled into the AVS if appropriate.

## 2023-10-18 LAB
ALBUMIN SERPL-MCNC: 4.2 G/DL (ref 3.5–5.2)
ALBUMIN/GLOB SERPL: 1.7 G/DL
ALP SERPL-CCNC: 62 U/L (ref 39–117)
ALT SERPL-CCNC: 7 U/L (ref 1–33)
AST SERPL-CCNC: 14 U/L (ref 1–32)
BASOPHILS # BLD AUTO: 0.1 10*3/MM3 (ref 0–0.2)
BASOPHILS NFR BLD AUTO: 2.2 % (ref 0–1.5)
BILIRUB SERPL-MCNC: 0.5 MG/DL (ref 0–1.2)
BUN SERPL-MCNC: 23 MG/DL (ref 8–23)
BUN/CREAT SERPL: 24.7 (ref 7–25)
CALCIUM SERPL-MCNC: 9.9 MG/DL (ref 8.2–9.6)
CHLORIDE SERPL-SCNC: 105 MMOL/L (ref 98–107)
CHOLEST SERPL-MCNC: 225 MG/DL (ref 0–200)
CHOLEST/HDLC SERPL: 3.81 {RATIO}
CO2 SERPL-SCNC: 26.5 MMOL/L (ref 22–29)
CREAT SERPL-MCNC: 0.93 MG/DL (ref 0.57–1)
EGFRCR SERPLBLD CKD-EPI 2021: 58.5 ML/MIN/1.73
EOSINOPHIL # BLD AUTO: 0.34 10*3/MM3 (ref 0–0.4)
EOSINOPHIL NFR BLD AUTO: 7.6 % (ref 0.3–6.2)
ERYTHROCYTE [DISTWIDTH] IN BLOOD BY AUTOMATED COUNT: 12.4 % (ref 12.3–15.4)
GLOBULIN SER CALC-MCNC: 2.5 GM/DL
GLUCOSE SERPL-MCNC: 94 MG/DL (ref 65–99)
HCT VFR BLD AUTO: 39.8 % (ref 34–46.6)
HDLC SERPL-MCNC: 59 MG/DL (ref 40–60)
HGB BLD-MCNC: 13.2 G/DL (ref 12–15.9)
IMM GRANULOCYTES # BLD AUTO: 0.02 10*3/MM3 (ref 0–0.05)
IMM GRANULOCYTES NFR BLD AUTO: 0.4 % (ref 0–0.5)
LDLC SERPL CALC-MCNC: 149 MG/DL (ref 0–100)
LYMPHOCYTES # BLD AUTO: 0.95 10*3/MM3 (ref 0.7–3.1)
LYMPHOCYTES NFR BLD AUTO: 21.3 % (ref 19.6–45.3)
MCH RBC QN AUTO: 31.1 PG (ref 26.6–33)
MCHC RBC AUTO-ENTMCNC: 33.2 G/DL (ref 31.5–35.7)
MCV RBC AUTO: 93.6 FL (ref 79–97)
MONOCYTES # BLD AUTO: 0.36 10*3/MM3 (ref 0.1–0.9)
MONOCYTES NFR BLD AUTO: 8.1 % (ref 5–12)
NEUTROPHILS # BLD AUTO: 2.69 10*3/MM3 (ref 1.7–7)
NEUTROPHILS NFR BLD AUTO: 60.4 % (ref 42.7–76)
NRBC BLD AUTO-RTO: 0 /100 WBC (ref 0–0.2)
PLATELET # BLD AUTO: 330 10*3/MM3 (ref 140–450)
POTASSIUM SERPL-SCNC: 5 MMOL/L (ref 3.5–5.2)
PROT SERPL-MCNC: 6.7 G/DL (ref 6–8.5)
RBC # BLD AUTO: 4.25 10*6/MM3 (ref 3.77–5.28)
SODIUM SERPL-SCNC: 142 MMOL/L (ref 136–145)
TRIGL SERPL-MCNC: 94 MG/DL (ref 0–150)
TSH SERPL DL<=0.005 MIU/L-ACNC: 1.49 UIU/ML (ref 0.27–4.2)
VIT B12 SERPL-MCNC: 357 PG/ML (ref 211–946)
VLDLC SERPL CALC-MCNC: 17 MG/DL (ref 5–40)
WBC # BLD AUTO: 4.46 10*3/MM3 (ref 3.4–10.8)

## 2023-12-19 ENCOUNTER — APPOINTMENT (OUTPATIENT)
Dept: WOMENS IMAGING | Facility: HOSPITAL | Age: 88
End: 2023-12-19
Payer: MEDICARE

## 2023-12-19 PROCEDURE — 77063 BREAST TOMOSYNTHESIS BI: CPT | Performed by: RADIOLOGY

## 2023-12-19 PROCEDURE — 77067 SCR MAMMO BI INCL CAD: CPT | Performed by: RADIOLOGY

## 2023-12-26 ENCOUNTER — LAB (OUTPATIENT)
Dept: LAB | Facility: HOSPITAL | Age: 88
End: 2023-12-26
Payer: MEDICARE

## 2023-12-26 ENCOUNTER — OFFICE VISIT (OUTPATIENT)
Dept: ONCOLOGY | Facility: CLINIC | Age: 88
End: 2023-12-26
Payer: MEDICARE

## 2023-12-26 VITALS
BODY MASS INDEX: 20.54 KG/M2 | SYSTOLIC BLOOD PRESSURE: 137 MMHG | DIASTOLIC BLOOD PRESSURE: 72 MMHG | HEIGHT: 66 IN | HEART RATE: 63 BPM | TEMPERATURE: 97.8 F | WEIGHT: 127.8 LBS | OXYGEN SATURATION: 96 % | RESPIRATION RATE: 16 BRPM

## 2023-12-26 DIAGNOSIS — C50.919 MALIGNANT NEOPLASM OF BREAST IN FEMALE, ESTROGEN RECEPTOR POSITIVE, UNSPECIFIED LATERALITY, UNSPECIFIED SITE OF BREAST: Primary | ICD-10-CM

## 2023-12-26 DIAGNOSIS — Z17.0 MALIGNANT NEOPLASM OF BREAST IN FEMALE, ESTROGEN RECEPTOR POSITIVE, UNSPECIFIED LATERALITY, UNSPECIFIED SITE OF BREAST: Primary | ICD-10-CM

## 2023-12-26 DIAGNOSIS — C50.919 MALIGNANT NEOPLASM OF BREAST IN FEMALE, ESTROGEN RECEPTOR POSITIVE, UNSPECIFIED LATERALITY, UNSPECIFIED SITE OF BREAST: ICD-10-CM

## 2023-12-26 DIAGNOSIS — Z17.0 MALIGNANT NEOPLASM OF BREAST IN FEMALE, ESTROGEN RECEPTOR POSITIVE, UNSPECIFIED LATERALITY, UNSPECIFIED SITE OF BREAST: ICD-10-CM

## 2023-12-26 LAB
ALBUMIN SERPL-MCNC: 3.9 G/DL (ref 3.5–5.2)
ALBUMIN/GLOB SERPL: 1.5 G/DL
ALP SERPL-CCNC: 57 U/L (ref 39–117)
ALT SERPL W P-5'-P-CCNC: <5 U/L (ref 1–33)
ANION GAP SERPL CALCULATED.3IONS-SCNC: 7.7 MMOL/L (ref 5–15)
AST SERPL-CCNC: 17 U/L (ref 1–32)
BASOPHILS # BLD AUTO: 0.11 10*3/MM3 (ref 0–0.2)
BASOPHILS NFR BLD AUTO: 1.9 % (ref 0–1.5)
BILIRUB SERPL-MCNC: 0.4 MG/DL (ref 0–1.2)
BUN SERPL-MCNC: 22 MG/DL (ref 8–23)
BUN/CREAT SERPL: 19.8 (ref 7–25)
CALCIUM SPEC-SCNC: 9 MG/DL (ref 8.2–9.6)
CHLORIDE SERPL-SCNC: 108 MMOL/L (ref 98–107)
CO2 SERPL-SCNC: 27.3 MMOL/L (ref 22–29)
CREAT SERPL-MCNC: 1.11 MG/DL (ref 0.57–1)
DEPRECATED RDW RBC AUTO: 47.1 FL (ref 37–54)
EGFRCR SERPLBLD CKD-EPI 2021: 47.3 ML/MIN/1.73
EOSINOPHIL # BLD AUTO: 0.31 10*3/MM3 (ref 0–0.4)
EOSINOPHIL NFR BLD AUTO: 5.5 % (ref 0.3–6.2)
ERYTHROCYTE [DISTWIDTH] IN BLOOD BY AUTOMATED COUNT: 13.2 % (ref 12.3–15.4)
GLOBULIN UR ELPH-MCNC: 2.6 GM/DL
GLUCOSE SERPL-MCNC: 89 MG/DL (ref 65–99)
HCT VFR BLD AUTO: 41.3 % (ref 34–46.6)
HGB BLD-MCNC: 13.3 G/DL (ref 12–15.9)
IMM GRANULOCYTES # BLD AUTO: 0.01 10*3/MM3 (ref 0–0.05)
IMM GRANULOCYTES NFR BLD AUTO: 0.2 % (ref 0–0.5)
LYMPHOCYTES # BLD AUTO: 1.39 10*3/MM3 (ref 0.7–3.1)
LYMPHOCYTES NFR BLD AUTO: 24.5 % (ref 19.6–45.3)
MCH RBC QN AUTO: 31.2 PG (ref 26.6–33)
MCHC RBC AUTO-ENTMCNC: 32.2 G/DL (ref 31.5–35.7)
MCV RBC AUTO: 96.9 FL (ref 79–97)
MONOCYTES # BLD AUTO: 0.43 10*3/MM3 (ref 0.1–0.9)
MONOCYTES NFR BLD AUTO: 7.6 % (ref 5–12)
NEUTROPHILS NFR BLD AUTO: 3.43 10*3/MM3 (ref 1.7–7)
NEUTROPHILS NFR BLD AUTO: 60.3 % (ref 42.7–76)
NRBC BLD AUTO-RTO: 0 /100 WBC (ref 0–0.2)
PLATELET # BLD AUTO: 260 10*3/MM3 (ref 140–450)
PMV BLD AUTO: 9.8 FL (ref 6–12)
POTASSIUM SERPL-SCNC: 4.6 MMOL/L (ref 3.5–5.2)
PROT SERPL-MCNC: 6.5 G/DL (ref 6–8.5)
RBC # BLD AUTO: 4.26 10*6/MM3 (ref 3.77–5.28)
SODIUM SERPL-SCNC: 143 MMOL/L (ref 136–145)
WBC NRBC COR # BLD AUTO: 5.68 10*3/MM3 (ref 3.4–10.8)

## 2023-12-26 PROCEDURE — 85025 COMPLETE CBC W/AUTO DIFF WBC: CPT

## 2023-12-26 PROCEDURE — 36415 COLL VENOUS BLD VENIPUNCTURE: CPT

## 2023-12-26 PROCEDURE — 80053 COMPREHEN METABOLIC PANEL: CPT

## 2023-12-26 NOTE — PROGRESS NOTES
Subjective       REASON FOR FOLLOW UP:    1. pT1c pNX Invasive Lobular Carcinoma of the Left Breast measuring 20 mm, grade II.  Her tumor was ER+ 96%, VA+ 96%, and HER2/clayton Negative (0) with Ki67 of 3.34%.      2.  Patient was to start Arimidex February 2020 but has not started.  On further discussion she wants to avoid any medications including tamoxifen.    3.  Currently receiving radiation     4.  Osteopenia, had a bone density which I have reviewed with the patient.                               HISTORY OF PRESENT ILLNESS:        Patient is a 88-year-old female with history of T1c NX invasive lobular carcinoma of the left breast, 20 mm, ER VA positive HER-2/clayton negative who was to start Arimidex . Patient had refused any endocrine therapy and currently is followed with observation. She does do her self breast exam monthly. Her last screening mammogram was May 2021 which was negative. She did fall and hurt her arm and had to undergo surgery in September 2021. So she still has pain and has to undergo physical therapy.    Patient had CT chest ordered by her primary care physician because of shortness of breath which showed there was some small airspace consolidation in the right apex which was not present in 2006 but age indeterminate. It could be acute or subacute infectious infiltrate but a conservative surveillance with repeat CT suggested in 3 months. The parenchymal and pleural thickening in the anterior aspect of the left upper lobe was likely secondary to left breast radiation. Patient has follow-up appointment with pulmonary physician in the next couple weeks.    Interval history:  Patient is 89-year-old female with history of T1CNX invasive lobular carcinoma left breast ER/VA positive HER2 negative.  She did not want to start any endocrine therapy and hence followed with observation since her diagnosis in 2020.  Currently she is doing well.  [facility]          Oncologic history:  The patient is a 89 y.o. year old female who is here for an opinion about the above issue.    She is currently followed by PCP Dr. Yuniel Bonilla.  Patient had never had mammograms since 2013.  In October 2019 she noted indentation of the skin in the breast.  She did not have pain but she did feel a marble size lump at the time.  She had a screening mammogram in November 2019 which had revealed a 12 mm mass in the middle one-third region of the left breast at 1:00 position 4 cmfn.  She, then, went for a diagnostic mammogram and ultrasound in December.  The mammogram revealed an irregular 15 mm mass in the left breast at the 12:00 position and the ultrasound revealed an irregular [id number] mm mass in the left breast at the 12:00 position 4 cmfn and an oval elongated mass measuring 7x3x5 mm in the left breast at the 1:00 position.  She was referred to Dr. Plasencia who performed an US-guided biopsy which showed Invasive Carcinoma with Ductal and Lobular features, low grade, measuring 15 mm, ER+ 96%, MN+ 96%, HER2/clayton negative (0), Ki67 3%.  The mass at the 1:00 position was a fibroadenoma.  She underwent a left breast lumpectomy on February 5, 2020 which confirmed Invasive Lobular Carcinoma of the Left Breast measuring 20 mm, grade II.  Her tumor was ER+ 96%, MN+ 96%, and HER2/clayton Negative (0).    Her personal history consists of vertigo, skin cancer of right leg in 2008 and of left nose in 2015.  She had a melanoma in 2017.    Her family history consists of her sister who had breast cancer at age 46.  Her maternal grandmother had breast cancer in her 70s.  Her daughter has chronic lymphocytic leukemia.        Details of the diagnostics scans and procedures are as follows:    11/27/19 - Screening Mammogram  Finding 1: There is a new irregular mass measuring 12 mm with spiculated margins seen inthe middle one-third region of the left beast at the 1:00 position located 4  cmfn.    Finding 2: There are vascular calcifications seen in both breasts.  No suspicious masses in the right breast.    12/26/19 - Diagnostic Mammogram  Finding 1: There is an irregular mass measuring 15 mm with spiculated margins in the upper outer region of the left breast 4 cmfn.      Ultrasound:  Finding 1: Irregular hypoechoic mass measuring [id number] mm at 12:00 4cmfn.    Finding 2: There is an oval elongated mass with indistinct margins measuring 7x3x5 mm in the left breast at 1:00, 1.7 cm from the mass at 12:00.    01/09/20 - US-Guided Biopsy  Left Breast 12:00 Position   Invasive Carcinoma with Ductal and Lobular features, low grade, measuring 15 mm, ER+ 96%, CA+ 96%, HER2/clayton negative (0), Ki67 3%    Left Breast 1:00 Position  Fibroadenoma    02/05/20 - Left Breast Lumpectomy   A. Invasive lobular carcinoma:                            1. Invasive carcinoma measures 20 mm x  14 mm x 10 mm.                            2. Overall [place] grade II (tubular score = 3, nuclear score = 2,                                mitotic score = 1).                            3. No lymphovascular invasion identified.                            4. Microcalcifications are present in the invasive component.               B. No carcinoma in situ component identified.               C. All margins are negative for invasive carcinoma.                    Carcinoma measures 2.0 mm from the closest (Medial) margin of excision.                     All other margins measure at least 4.0 mm from invasive carcinoma including:                            Anterior margin = 5 mm                            Posterior margin = 10 mm                            Superior margin = 20 mm                            Inferior margin =15mm                             Lateral margin = 4 mm                            Medial margin = 2 mm                  D. Rare focus of atypical lobular hyperplasia (ALH) noted, Not present at the margin. Focal  fibroadenoma                    involved by invasive lobular carcinoma noted, Not present at the margin.               E. Focal biopsy site changes are identified.               F. Focal atypical ductal hyperplasia noted, not present at the margin.               G. Non-neoplastic breast tissue with intraductal papilloma, adenosis and fibrocystic change noted.                    Microcalcification noted in benign breast tissue.               H. Previous Biomarkers: Estrogen receptors: positive (95.53%), Progesterone receptors: positive (96.15%),                    HER/2-clayton: negative (0), Ki-67 = 3.34%    TUMOR   Tumor Site  Upper outer quadrant    Histologic Type  Invasive lobular carcinoma    Glandular (Acinar) / Tubular Differentiation  Score 3    Nuclear Pleomorphism  Score 2    Mitotic Rate  Score 1    Overall Grade  Grade 2 (scores of 6 or 7)    Tumor Size  Greatest dimension of largest invasive focus (Millimeters): 20 mm   Additional Dimension (Millimeters)  14 mm     10 mm   Tumor Focality  Single focus of invasive carcinoma    Ductal Carcinoma In Situ (DCIS)  Not identified    Lobular Carcinoma In Situ (LCIS)  No LCIS in specimen    Tumor Extent     Lymphovascular Invasion  Not identified    Dermal Lymphovascular Invasion  No skin present    Microcalcifications  Present in invasive carcinoma      Present in non-neoplastic tissue    Treatment Effect  No known presurgical therapy    MARGINS   Invasive Carcinoma Margins  Uninvolved by invasive carcinoma    Distance from Closest Margin (Millimeters)  5 mm   Closest Margin  Anterior    Distance from Other Margins     Anterior Margin (Millimeters)  5 mm   Posterior Margin (Millimeters)  10 mm   Superior Margin (Millimeters)  20 mm   Inferior Margin (Millimeters)  15 mm   Medial Margin (Millimeters)  8 mm   Lateral Margin (Millimeters)  10 mm   LYMPH NODES   Regional Lymph Nodes  No lymph nodes submitted or found    PATHOLOGIC STAGE CLASSIFICATION (pTNM, AJCC 8th  "Edition)   Primary Tumor (pT)  pT1c    Regional Lymph Nodes (pN)     Category (pN)  pNX    SPECIAL STUDIES   Breast Biomarker Testing Performed on Previous Biopsy     Estrogen Receptor (ER)  Positive (percentage): 95.5 %   Breast Biomarker Testing Performed on Previous Biopsy     Progesterone Receptor (PgR)  Positive (percentage): 96.2 %   Breast Biomarker Testing Performed on Previous Biopsy     HER2 (by immunohistochemistry)  Negative (Score 0)    Testing Performed on Case Number  [id number] (Not reviewed)    Comment(s)   Comment(s)  Ki67= 3.34%      Patient refused endocrine therapy    Currently on radiation          Past Medical History:   Diagnosis Date    Anxiety     Arthritis     Cancer 01/2020    left breast    Cancer of skin of leg, right 2021    Coronary artery disease     \"BLOCKAGE IN MY LEGS\" stents bilateral legs per pt    COVID-19 virus infection 09/13/2021    Hemorrhoids     History of basal cell cancer     History of headaches     Humerus fracture     LEFT     Hypertension     Hypothyroidism     Melanoma 2017    LEFT LEG    Osteopenia     PAD (peripheral artery disease)     Skin cancer 2015    left nose    Skin cancer 2008    right leg    Urinary tract infection 10/12/22    Treated in October    Vertigo     Vestibular migraine 2021        Past Surgical History:   Procedure Laterality Date    BREAST LUMPECTOMY WITH SENTINEL NODE BIOPSY Left 2/5/2020    Procedure: BREAST LUMPECTOMY WITH NEEDLE LOCALIZATION;  Surgeon: Kush Plasencia MD;  Location: [facility];  Service: General;  Laterality: Left;    BREAST SURGERY Left 2020    BIOPSY    COLONOSCOPY      ALL OK    COSMETIC SURGERY      AFTER SKIN EXCISION FROM NOSE    ENDOSCOPY      EYE SURGERY Bilateral     CATARACTS    FRACTURE SURGERY  9/23/21    LEG SURGERY Left 2017    MELANOMA EXCISION-WITH SKIN GRAFT    ORIF HUMERUS FRACTURE Left 9/23/2021    Procedure: Open Reduction and Internal Fixation Proximal Humerus;  Surgeon: Sherman Panchal MD;  " Location: [facility];  Service: Orthopedics;  Laterality: Left;    SKIN BIOPSY  2008    SKIN CANCER EXCISION Bilateral 2015    NOSE, UPPER LIP    SKIN GRAFT Bilateral     VASCULAR SURGERY Bilateral 2018    STENTS IN BOTH UPPER LEGS        Current Outpatient Medications on File Prior to Visit   Medication Sig Dispense Refill    amLODIPine (NORVASC) 2.5 MG tablet 1 in am. 90 tablet 3    aspirin 81 MG chewable tablet Chew 1 tablet Daily.      calcium carbonate (OS-PAUL) 600 MG tablet Take 1 tablet by mouth Daily.      cholecalciferol (VITAMIN D3) 25 MCG (1000 UT) tablet Take 1 tablet by mouth Daily.      levothyroxine (SYNTHROID, LEVOTHROID) 75 MCG tablet One a day 90 tablet 3    magnesium oxide (MAGOX) 400 (241.3 Mg) MG tablet tablet Take 1 tablet by mouth Daily.      meclizine (ANTIVERT) 25 MG tablet Take 1 tablet by mouth 3 (Three) Times a Day As Needed for Dizziness. 90 tablet 3    Riboflavin (VITAMIN B2 PO) Take 1 tablet by mouth Daily.       No current facility-administered medications on file prior to visit.        ALLERGIES:    Allergies   Allergen Reactions    Codeine Mental Status Change        Social History     Socioeconomic History    Marital status:      Spouse name: Vladimir    Number of children: 4   Tobacco Use    Smoking status: Former     Packs/day: 1.00     Years: 20.00     Additional pack years: 0.00     Total pack years: 20.00     Types: Cigarettes     Quit date: 1970     Years since quittin.0    Smokeless tobacco: Never    Tobacco comments:     20 years. None    Vaping Use    Vaping Use: Never used   Substance and Sexual Activity    Alcohol use: Never    Drug use: Never    Sexual activity: Not Currently     Partners: Male        Family History   Problem Relation Age of Onset    Heart failure Father     Skin cancer Father     Heart disease Father     Breast cancer Sister 46    Heart disease Sister     Esophageal cancer Brother     Heart disease Brother     Leukemia Daughter      "Breast cancer Maternal Grandmother         70s    Colon cancer Maternal Grandmother     Cancer Sister         Breast    Cancer Brother         Esophageal    Malig Hyperthermia Neg Hx         Review of Systems   Constitutional: Negative.  Negative for appetite change, chills, diaphoresis, fatigue, fever and unexpected weight change.   HENT:  Negative for hearing loss, sore throat and trouble swallowing.    Eyes: Negative.    Respiratory:  Positive for shortness of breath. Negative for cough, chest tightness and wheezing.    Cardiovascular: Negative.  Negative for chest pain, palpitations and leg swelling.   Gastrointestinal: Negative.  Negative for abdominal distention, abdominal pain, constipation, diarrhea, nausea and vomiting.   Endocrine: Negative.    Genitourinary: Negative.  Negative for dysuria, frequency, hematuria and urgency.   Musculoskeletal: Negative.  Negative for joint swelling.        No muscle weakness.   Skin: Negative.  Negative for rash and wound.   Allergic/Immunologic: Negative.    Neurological:  Negative for seizures, syncope, speech difficulty, weakness and numbness. Dizziness: 11/24/20-Unchanged. Headaches: 11/24/20-Unchanged.  Hematological: Negative.  Negative for adenopathy. Does not bruise/bleed easily.   Psychiatric/Behavioral: Negative.  Negative for behavioral problems, confusion and suicidal ideas.    All other systems reviewed and are negative.  Patient has vertigo stable  I  I have reviewed and confirmed the accuracy of the ROS as documented by the MA/LPN/RN Sowmya Faulkner MD        Objective     Vitals:    12/26/23 1413   BP: 137/72   Pulse: 63   Resp: 16   Temp: 97.8 °F (36.6 °C)   TempSrc: Temporal   SpO2: 96%   Weight: 58 kg (127 lb 12.8 oz)   Height: 167.6 cm (65.98\")   PainSc:   8   PainLoc: Leg  Comment: Left Leg           12/26/2023     1:55 PM   Current Status   ECOG score 0     Physical examination      This patient's ACP documentation is up to date, and there's nothing " further left to document.      CONSTITUTIONAL:  Vital signs reviewed.  No distress, looks comfortable.  BREAST: Right breast: No skin changes, no evidence of breast mass, no nipple discharge, no evidence of any right axillary adenopathy or right supraclavicular adenopathy  Left breast: No evidence of any skin changes, no evidence of any left breast mass and no evidence of left nipple discharge as well as no left axillary adenopathy or left supraclavicular adenopathy.  RESPIRATORY:  Normal respiratory effort.  Lungs clear to auscultation bilaterally.  CARDIOVASCULAR:  Normal S1, S2.  No murmurs rubs or gallops.  No significant lower extremity edema.  GASTROINTESTINAL: Abdomen appears unremarkable.  Nontender.  No hepatomegaly.  No splenomegaly.  LYMPHATIC:  No cervical, supraclavicular, axillary lymphadenopathy.  SKIN:  Warm.  No rashes.  PSYCHIATRIC:  Normal judgment and insight.  Normal mood and affect.      I have reexamined the patient and the results are consistent with the previously documented exam. Sowmya Faulkner MD         RECENT LABS:  Hematology WBC   Date Value Ref Range Status   12/26/2023 5.68 3.40 - 10.80 10*3/mm3 Final   10/17/2023 4.46 3.40 - 10.80 10*3/mm3 Final   09/17/2018 6.22 4.5 - 11.0 10*3/uL Final     RBC   Date Value Ref Range Status   12/26/2023 4.26 3.77 - 5.28 10*6/mm3 Final   10/17/2023 4.25 3.77 - 5.28 10*6/mm3 Final   09/17/2018 4.34 4.0 - 5.2 10*6/uL Final     Hemoglobin   Date Value Ref Range Status   12/26/2023 13.3 12.0 - 15.9 g/dL Final   09/17/2018 13.2 12.0 - 16.0 g/dL Final     Hematocrit   Date Value Ref Range Status   12/26/2023 41.3 34.0 - 46.6 % Final   09/17/2018 41.3 36.0 - 46.0 % Final     Platelets   Date Value Ref Range Status   12/26/2023 260 140 - 450 10*3/mm3 Final   09/17/2018 457 (H) 140 - 440 10*3/uL Final          Assessment & Plan     pT1c pNX Invasive Lobular Carcinoma of the Left Breast measuring 20 mm, grade II.  Her tumor was ER+ 96%, MN+ 96%, and HER2/clayton  Negative (0) with Ki67 of 3.34%.    -Screening mammogram, 12 mm mass, spiculated margins left breast, 1 o'clock position, 4 cm from the nipple  -Diagnostic mammogram, irregular 15 mm mass left breast , 4 cm from the nipple  -Ultrasound showed irregular mass 15 x 13 x 13 at 12:00, oval mass with indistinct margin 7 x 3 x 5 mm left breast at 1:00  -Left breast ultrasound-guided biopsy, 12 o'clock position, invasive carcinoma with ductal and lobular features  -left breast C, 1 o'clock position, fibroadenoma  -February 5, 2020, left breast lumpectomy, invasive lobular carcinoma, 20 x 14 x 10 mm, grade 2, [place] score of 5 no lymphovascular space invasion, no evidence of DCIS, margins are all negative, rare focus of atypical lobular hyperplasia, not present at margin,    I had a lengthy discussion with the patient and family that at the age of 86 years given that this is highly ER MD positive tumor, HER-2 negative, she is not a candidate for any chemotherapy given her age.  I do not see any need to obtain an Oncotype DX in the setting.  She is certainly a candidate for endocrine therapy.  My question would be if sentinel lymph node has to be considered given that she had a 20 mm invasive lobular carcinoma.  However patient is being seen by radiation oncology and will get input from them as well to see if axilla would be included.  Will discuss with Dr. Plasencia.  Patient to consult radiation oncology for opinion on 03/03/20.  Given patient's cancer was ER/MD+, she will certainly benefit from endocrine therapy.  Given her past medical history and current health, I think Arimidex would be the best choice of treatment.  Patient has refused to undergo any endocrine therapy after explaining the details of the side effects.  She is to follow-up with radiation oncology and so far 17 out of 21 treatments given  Patient has refused endocrine therapy even after discussing in length.  June 28, 2023: Patient without complaints.   Patient's last mammogram was December 12, 2022 which was negative.  Since patient's sister and grandmother had breast cancer we had discussed about consideration of genetic testing but patient had refused.  She is not on endocrine therapy and is currently followed with observation.  She is 3 years out and doing reasonably well  December 26, 2023: Currently doing very well without any new problems.  Reviewed mammogram from last week December 2023 and it is negative.    Family history of malignancy.  Her family history consists of her sister who had breast cancer at age 46.  Her maternal grandmother had breast cancer in her 70s.  Her daughter has chronic lymphocytic leukemia.  Genetic testing could be considered to see if that information benefits the rest of the family.      Patient refused genetic testing    History of skin cancer in the right leg (2008) and left nose (2015).    History of Melanoma of the left leg (2017).  Patient to follow with dermatology    Vertigo.  Patient reports feeling dizzy constantly.  Stable  Osteopenia: Patient to take calcium and vitamin D supplements.  Discussed either oral bisphosphonates like Actonel or Fosamax or Prolia injectable but patient refuses  Last bone density March 2020 and she is not due till March 2022. She has osteopenia but refused any bisphosphonates    Fatigue since radiation likely related to radiation  Chronic bilateral parenchymal infiltrates,            PLAN:  Patient is followed with observation  Currently no evidence of disease  Reviewed mammogram from December 2023 which is negative  Follow-up with me in 6 months with labs    Sowmya Faulkner MD       Cc:  MD Sima Costa MD Stephen Self, MD Stuart Spalding, MD

## 2024-03-25 DIAGNOSIS — E03.9 ACQUIRED HYPOTHYROIDISM: ICD-10-CM

## 2024-03-25 RX ORDER — LEVOTHYROXINE SODIUM 0.07 MG/1
TABLET ORAL
Qty: 90 TABLET | Refills: 3 | Status: SHIPPED | OUTPATIENT
Start: 2024-03-25

## 2024-04-30 DIAGNOSIS — I10 ESSENTIAL HYPERTENSION: ICD-10-CM

## 2024-04-30 RX ORDER — AMLODIPINE BESYLATE 2.5 MG/1
TABLET ORAL
Qty: 90 TABLET | Refills: 3 | Status: SHIPPED | OUTPATIENT
Start: 2024-04-30

## 2024-06-13 ENCOUNTER — OFFICE VISIT (OUTPATIENT)
Dept: FAMILY MEDICINE CLINIC | Facility: CLINIC | Age: 89
End: 2024-06-13
Payer: MEDICARE

## 2024-06-13 ENCOUNTER — TELEPHONE (OUTPATIENT)
Dept: FAMILY MEDICINE CLINIC | Facility: CLINIC | Age: 89
End: 2024-06-13

## 2024-06-13 VITALS
RESPIRATION RATE: 12 BRPM | TEMPERATURE: 97.8 F | HEIGHT: 66 IN | HEART RATE: 59 BPM | WEIGHT: 124.6 LBS | DIASTOLIC BLOOD PRESSURE: 76 MMHG | SYSTOLIC BLOOD PRESSURE: 132 MMHG | BODY MASS INDEX: 20.03 KG/M2 | OXYGEN SATURATION: 99 %

## 2024-06-13 DIAGNOSIS — E03.9 ACQUIRED HYPOTHYROIDISM: ICD-10-CM

## 2024-06-13 DIAGNOSIS — R42 DIZZINESS: ICD-10-CM

## 2024-06-13 DIAGNOSIS — E55.9 VITAMIN D DEFICIENCY, UNSPECIFIED: ICD-10-CM

## 2024-06-13 DIAGNOSIS — I10 ESSENTIAL HYPERTENSION: ICD-10-CM

## 2024-06-13 DIAGNOSIS — G43.009 MIGRAINE WITHOUT AURA AND WITHOUT STATUS MIGRAINOSUS, NOT INTRACTABLE: Primary | ICD-10-CM

## 2024-06-13 PROCEDURE — 99214 OFFICE O/P EST MOD 30 MIN: CPT | Performed by: FAMILY MEDICINE

## 2024-06-13 PROCEDURE — G2211 COMPLEX E/M VISIT ADD ON: HCPCS | Performed by: FAMILY MEDICINE

## 2024-06-13 PROCEDURE — 1125F AMNT PAIN NOTED PAIN PRSNT: CPT | Performed by: FAMILY MEDICINE

## 2024-06-13 PROCEDURE — 1170F FXNL STATUS ASSESSED: CPT | Performed by: FAMILY MEDICINE

## 2024-06-13 RX ORDER — ATOGEPANT 60 MG/1
60 TABLET ORAL DAILY
Qty: 30 TABLET | Refills: 0 | COMMUNITY
Start: 2024-06-13

## 2024-06-13 RX ORDER — ATOGEPANT 60 MG/1
60 TABLET ORAL DAILY
Start: 2024-06-13

## 2024-06-13 RX ORDER — ATOGEPANT 60 MG/1
TABLET ORAL
COMMUNITY
End: 2024-06-13 | Stop reason: SDUPTHER

## 2024-06-13 NOTE — PROGRESS NOTES
Chief Complaint   Patient presents with    Hypothyroidism            C/o migraine  Subjective   Mela Muse is a 89 y.o. female.     Hypothyroidism  Pertinent negatives include no abdominal pain, arthralgias, chest pain, fatigue, myalgias, rash, sore throat or weakness.      C/o trouble with migraine headaches.  Has had for over 40 years.  Has about 1 a week.    F/U HTN.  Doing well with meds.    F/U hypothyrodiism.  Dong well with meds.    The following portions of the patient's history were reviewed and updated as appropriate: allergies, current medications, past family history, past medical history, past social history, past surgical history and problem list.    Review of Systems   Constitutional:  Negative for appetite change and fatigue.   HENT:  Negative for nosebleeds and sore throat.    Eyes:  Negative for blurred vision and visual disturbance.   Respiratory:  Negative for shortness of breath and wheezing.    Cardiovascular:  Negative for chest pain and leg swelling.   Gastrointestinal:  Negative for abdominal distention and abdominal pain.   Endocrine: Negative for cold intolerance and polyuria.   Genitourinary:  Negative for dysuria and hematuria.   Musculoskeletal:  Negative for arthralgias and myalgias.   Skin:  Negative for color change and rash.   Neurological:  Positive for headache. Negative for weakness and confusion.   Psychiatric/Behavioral:  Negative for agitation and depressed mood.        Patient Active Problem List   Diagnosis    Malignant neoplasm of breast in female, estrogen receptor positive    Vertigo    History of skin cancer    History of melanoma    Osteoporosis    Post-menopausal    Dizziness    Essential hypertension    PAD (peripheral artery disease)    Claudication of both lower extremities    Asymptomatic bilateral carotid artery stenosis    Osteopenia of hip    Other fatigue    Medicare annual wellness visit, subsequent    Acquired hypothyroidism    Left-sided chest pain     "COVID-19    Closed fracture of left proximal humerus    Shortness of breath    Left upper lobe pulmonary infiltrate    COPD (chronic obstructive pulmonary disease)    Dysuria    Chronic fatigue    Sequelae of protein-calorie malnutrition    Vitamin D deficiency, unspecified    Abdominal pain    Thrombocytosis    Immunization deficiency    Encounter for immunization    Migraine without aura and without status migrainosus, not intractable       Allergies   Allergen Reactions    Codeine Mental Status Change         Current Outpatient Medications:     amLODIPine (NORVASC) 2.5 MG tablet, TAKE ONE TABLET BY MOUTH EVERY MORNING, Disp: 90 tablet, Rfl: 3    aspirin 81 MG chewable tablet, Chew 1 tablet Daily., Disp: , Rfl:     calcium carbonate (OS-PAUL) 600 MG tablet, Take 1 tablet by mouth Daily., Disp: , Rfl:     cholecalciferol (VITAMIN D3) 25 MCG (1000 UT) tablet, Take 1 tablet by mouth Daily., Disp: , Rfl:     levothyroxine (SYNTHROID, LEVOTHROID) 75 MCG tablet, TAKE ONE TABLET BY MOUTH DAILY, Disp: 90 tablet, Rfl: 3    magnesium oxide (MAGOX) 400 (241.3 Mg) MG tablet tablet, Take 1 tablet by mouth Daily., Disp: , Rfl:     meclizine (ANTIVERT) 25 MG tablet, Take 1 tablet by mouth 3 (Three) Times a Day As Needed for Dizziness., Disp: 90 tablet, Rfl: 3    Riboflavin (VITAMIN B2 PO), Take 1 tablet by mouth Daily., Disp: , Rfl:     Atogepant (Qulipta) 60 MG tablet, Take  by mouth., Disp: , Rfl:     Past Medical History:   Diagnosis Date    Anxiety     Arthritis     Cancer 01/2020    left breast    Cancer of skin of leg, right 2021    Coronary artery disease     \"BLOCKAGE IN MY LEGS\" stents bilateral legs per pt    COVID-19 virus infection 09/13/2021    Hemorrhoids     History of basal cell cancer     History of headaches     Humerus fracture     LEFT     Hypertension     Hypothyroidism     Melanoma 2017    LEFT LEG    Osteopenia     PAD (peripheral artery disease)     Skin cancer 2015    left nose    Skin cancer 2008    " right leg    Urinary tract infection 10/12/22    Treated in October    Vertigo     Vestibular migraine        Past Surgical History:   Procedure Laterality Date    BREAST LUMPECTOMY WITH SENTINEL NODE BIOPSY Left 2020    Procedure: BREAST LUMPECTOMY WITH NEEDLE LOCALIZATION;  Surgeon: Ksuh Plasencia MD;  Location: [facility];  Service: General;  Laterality: Left;    BREAST SURGERY Left     BIOPSY    COLONOSCOPY      ALL OK    COSMETIC SURGERY      AFTER SKIN EXCISION FROM NOSE    ENDOSCOPY      EYE SURGERY Bilateral     CATARACTS    FRACTURE SURGERY  21    LEG SURGERY Left 2017    MELANOMA EXCISION-WITH SKIN GRAFT    ORIF HUMERUS FRACTURE Left 2021    Procedure: Open Reduction and Internal Fixation Proximal Humerus;  Surgeon: Sherman Panchal MD;  Location: [facility];  Service: Orthopedics;  Laterality: Left;    SKIN BIOPSY      SKIN CANCER EXCISION Bilateral     NOSE, UPPER LIP    SKIN GRAFT Bilateral     VASCULAR SURGERY Bilateral 2018    STENTS IN BOTH UPPER LEGS       Family History   Problem Relation Age of Onset    Heart failure Father     Skin cancer Father     Heart disease Father     Breast cancer Sister 46    Heart disease Sister     Esophageal cancer Brother     Heart disease Brother     Leukemia Daughter     Breast cancer Maternal Grandmother         70s    Colon cancer Maternal Grandmother     Cancer Sister         Breast    Cancer Brother         Esophageal    Malig Hyperthermia Neg Hx        Social History     Tobacco Use    Smoking status: Former     Current packs/day: 0.00     Average packs/day: 1 pack/day for 20.0 years (20.0 ttl pk-yrs)     Types: Cigarettes     Start date: 1950     Quit date: 1970     Years since quittin.4    Smokeless tobacco: Never    Tobacco comments:     20 years. None    Substance Use Topics    Alcohol use: Never            Objective     Vitals:    24 1504   BP: 132/76   Pulse: 59   Resp: 12   Temp: 97.8  °F (36.6 °C)   SpO2: 99%     Body mass index is 20.12 kg/m².    Physical Exam  Vitals reviewed.   Constitutional:       Appearance: She is well-developed. She is not diaphoretic.   HENT:      Head: Normocephalic and atraumatic.   Eyes:      General: No scleral icterus.     Pupils: Pupils are equal, round, and reactive to light.   Neck:      Thyroid: No thyromegaly.   Cardiovascular:      Rate and Rhythm: Normal rate and regular rhythm.      Heart sounds: No murmur heard.     No friction rub. No gallop.   Pulmonary:      Effort: Pulmonary effort is normal. No respiratory distress.      Breath sounds: No wheezing or rales.   Chest:      Chest wall: No tenderness.   Abdominal:      General: Bowel sounds are normal. There is no distension.      Palpations: Abdomen is soft.      Tenderness: There is no abdominal tenderness.   Musculoskeletal:         General: No deformity. Normal range of motion.   Lymphadenopathy:      Cervical: No cervical adenopathy.   Skin:     General: Skin is warm and dry.      Findings: No rash.   Neurological:      Cranial Nerves: No cranial nerve deficit.      Motor: No abnormal muscle tone.         Lab Results   Component Value Date    GLUCOSE 89 12/26/2023    BUN 22 12/26/2023    CREATININE 1.11 (H) 12/26/2023    EGFRIFNONA 51 (L) 12/14/2021    EGFRIFAFRI 63 10/21/2021    BCR 19.8 12/26/2023    K 4.6 12/26/2023    CO2 27.3 12/26/2023    CALCIUM 9.0 12/26/2023    PROTENTOTREF 6.7 10/17/2023    ALBUMIN 3.9 12/26/2023    LABIL2 1.7 10/17/2023    AST 17 12/26/2023    ALT <5 12/26/2023       WBC   Date Value Ref Range Status   12/26/2023 5.68 3.40 - 10.80 10*3/mm3 Final   10/17/2023 4.46 3.40 - 10.80 10*3/mm3 Final   09/17/2018 6.22 4.5 - 11.0 10*3/uL Final     RBC   Date Value Ref Range Status   12/26/2023 4.26 3.77 - 5.28 10*6/mm3 Final   10/17/2023 4.25 3.77 - 5.28 10*6/mm3 Final   09/17/2018 4.34 4.0 - 5.2 10*6/uL Final     Hemoglobin   Date Value Ref Range Status   12/26/2023 13.3 12.0 - 15.9  g/dL Final   09/17/2018 13.2 12.0 - 16.0 g/dL Final     Hematocrit   Date Value Ref Range Status   12/26/2023 41.3 34.0 - 46.6 % Final   09/17/2018 41.3 36.0 - 46.0 % Final     MCV   Date Value Ref Range Status   12/26/2023 96.9 79.0 - 97.0 fL Final   09/17/2018 95.2 80.0 - 100.0 fL Final     MCH   Date Value Ref Range Status   12/26/2023 31.2 26.6 - 33.0 pg Final   09/17/2018 30.4 26.0 - 34.0 pg Final     MCHC   Date Value Ref Range Status   12/26/2023 32.2 31.5 - 35.7 g/dL Final   09/17/2018 32.0 31.0 - 37.0 g/dL Final     RDW   Date Value Ref Range Status   12/26/2023 13.2 12.3 - 15.4 % Final   09/17/2018 13.7 12.0 - 16.8 % Final     RDW-SD   Date Value Ref Range Status   12/26/2023 47.1 37.0 - 54.0 fl Final     MPV   Date Value Ref Range Status   12/26/2023 9.8 6.0 - 12.0 fL Final   09/17/2018 9.6 6.7 - 10.8 fL Final     Platelets   Date Value Ref Range Status   12/26/2023 260 140 - 450 10*3/mm3 Final   09/17/2018 457 (H) 140 - 440 10*3/uL Final     Neutrophil Rel %   Date Value Ref Range Status   09/17/2018 62.9 45 - 80 % Final     Neutrophil %   Date Value Ref Range Status   12/26/2023 60.3 42.7 - 76.0 % Final     Lymphocyte Rel %   Date Value Ref Range Status   09/17/2018 23.0 15 - 50 % Final     Lymphocyte %   Date Value Ref Range Status   12/26/2023 24.5 19.6 - 45.3 % Final     Monocyte Rel %   Date Value Ref Range Status   09/17/2018 8.7 0 - 15 % Final     Monocyte %   Date Value Ref Range Status   12/26/2023 7.6 5.0 - 12.0 % Final     Eosinophil %   Date Value Ref Range Status   12/26/2023 5.5 0.3 - 6.2 % Final   09/17/2018 3.4 0 - 7 % Final     Basophil Rel %   Date Value Ref Range Status   09/17/2018 1.8 0 - 2 % Final     Basophil %   Date Value Ref Range Status   12/26/2023 1.9 (H) 0.0 - 1.5 % Final     Immature Grans %   Date Value Ref Range Status   12/26/2023 0.2 0.0 - 0.5 % Final   09/17/2018 0.2 (H) 0 % Final     Neutrophils Absolute   Date Value Ref Range Status   09/17/2018 3.92 2.0 - 8.8  "10*3/uL Final     Neutrophils, Absolute   Date Value Ref Range Status   12/26/2023 3.43 1.70 - 7.00 10*3/mm3 Final     Lymphocytes Absolute   Date Value Ref Range Status   09/17/2018 1.43 0.7 - 5.5 10*3/uL Final     Lymphocytes, Absolute   Date Value Ref Range Status   12/26/2023 1.39 0.70 - 3.10 10*3/mm3 Final     Monocytes Absolute   Date Value Ref Range Status   09/17/2018 0.54 0.0 - 1.7 10*3/uL Final     Monocytes, Absolute   Date Value Ref Range Status   12/26/2023 0.43 0.10 - 0.90 10*3/mm3 Final     Eosinophils Absolute   Date Value Ref Range Status   09/17/2018 0.21 0.0 - 0.8 10*3/uL Final     Eosinophils, Absolute   Date Value Ref Range Status   12/26/2023 0.31 0.00 - 0.40 10*3/mm3 Final     Basophils Absolute   Date Value Ref Range Status   09/17/2018 0.11 0.0 - 0.2 10*3/uL Final     Basophils, Absolute   Date Value Ref Range Status   12/26/2023 0.11 0.00 - 0.20 10*3/mm3 Final     Immature Grans, Absolute   Date Value Ref Range Status   12/26/2023 0.01 0.00 - 0.05 10*3/mm3 Final   09/17/2018 0.01 <1 10*3/uL Final     nRBC   Date Value Ref Range Status   12/26/2023 0.0 0.0 - 0.2 /100 WBC Final       No results found for: \"HGBA1C\"    Lab Results   Component Value Date    MTRPETWR41 357 10/17/2023       TSH   Date Value Ref Range Status   10/17/2023 1.490 0.270 - 4.200 uIU/mL Final       No results found for: \"CHOL\"  Lab Results   Component Value Date    TRIG 94 10/17/2023     Lab Results   Component Value Date    HDL 59 10/17/2023     Lab Results   Component Value Date     (H) 10/17/2023     Lab Results   Component Value Date    VLDL 17 10/17/2023     No results found for: \"LDLHDL\"      Procedures    Assessment & Plan   Problems Addressed this Visit       Dizziness    Relevant Orders    CBC & Differential    Essential hypertension    Relevant Orders    Comprehensive Metabolic Panel    Acquired hypothyroidism    Relevant Orders    TSH    Vitamin D deficiency, unspecified    Relevant Orders    Vitamin " D,25-Hydroxy    Migraine without aura and without status migrainosus, not intractable - Primary     Diagnoses         Codes Comments    Migraine without aura and without status migrainosus, not intractable    -  Primary ICD-10-CM: G43.009  ICD-9-CM: 346.10     Vitamin D deficiency, unspecified     ICD-10-CM: E55.9  ICD-9-CM: 268.9     Acquired hypothyroidism     ICD-10-CM: E03.9  ICD-9-CM: 244.9     Essential hypertension     ICD-10-CM: I10  ICD-9-CM: 401.9     Dizziness     ICD-10-CM: R42  ICD-9-CM: 780.4         Migraine HA>  Uncontrolled.  Start qulipta 60 daily prn ha.      Orders Placed This Encounter   Procedures    Comprehensive Metabolic Panel     Order Specific Question:   Release to patient     Answer:   Routine Release [[id number]]    TSH     Order Specific Question:   Release to patient     Answer:   Routine Release [[id number]]    Vitamin D,25-Hydroxy     Order Specific Question:   Release to patient     Answer:   Routine Release [[id number]]    CBC & Differential     Order Specific Question:   Manual Differential     Answer:   No     Order Specific Question:   Release to patient     Answer:   Routine Release [[id number]]       Current Outpatient Medications   Medication Sig Dispense Refill    amLODIPine (NORVASC) 2.5 MG tablet TAKE ONE TABLET BY MOUTH EVERY MORNING 90 tablet 3    aspirin 81 MG chewable tablet Chew 1 tablet Daily.      calcium carbonate (OS-PAUL) 600 MG tablet Take 1 tablet by mouth Daily.      cholecalciferol (VITAMIN D3) 25 MCG (1000 UT) tablet Take 1 tablet by mouth Daily.      levothyroxine (SYNTHROID, LEVOTHROID) 75 MCG tablet TAKE ONE TABLET BY MOUTH DAILY 90 tablet 3    magnesium oxide (MAGOX) 400 (241.3 Mg) MG tablet tablet Take 1 tablet by mouth Daily.      meclizine (ANTIVERT) 25 MG tablet Take 1 tablet by mouth 3 (Three) Times a Day As Needed for Dizziness. 90 tablet 3    Riboflavin (VITAMIN B2 PO) Take 1 tablet by mouth Daily.      Atogepant (Qulipta) 60 MG tablet Take  by  mouth.       No current facility-administered medications for this visit.       Mela Muse had no medications administered during this visit.    Return in about 6 months (around 12/13/2024) for Medicare wellness and , 30 minutes.    There are no Patient Instructions on file for this visit.

## 2024-06-14 LAB
25(OH)D3+25(OH)D2 SERPL-MCNC: 39 NG/ML (ref 30–100)
ALBUMIN SERPL-MCNC: 4.3 G/DL (ref 3.5–5.2)
ALBUMIN/GLOB SERPL: 1.9 G/DL
ALP SERPL-CCNC: 54 U/L (ref 39–117)
ALT SERPL-CCNC: 8 U/L (ref 1–33)
AST SERPL-CCNC: 19 U/L (ref 1–32)
BASOPHILS # BLD AUTO: 0.08 10*3/MM3 (ref 0–0.2)
BASOPHILS NFR BLD AUTO: 1.7 % (ref 0–1.5)
BILIRUB SERPL-MCNC: 0.4 MG/DL (ref 0–1.2)
BUN SERPL-MCNC: 23 MG/DL (ref 8–23)
BUN/CREAT SERPL: 22.5 (ref 7–25)
CALCIUM SERPL-MCNC: 9.6 MG/DL (ref 8.2–9.6)
CHLORIDE SERPL-SCNC: 106 MMOL/L (ref 98–107)
CO2 SERPL-SCNC: 27.5 MMOL/L (ref 22–29)
CREAT SERPL-MCNC: 1.02 MG/DL (ref 0.57–1)
EGFRCR SERPLBLD CKD-EPI 2021: 52 ML/MIN/1.73
EOSINOPHIL # BLD AUTO: 0.08 10*3/MM3 (ref 0–0.4)
EOSINOPHIL NFR BLD AUTO: 1.7 % (ref 0.3–6.2)
ERYTHROCYTE [DISTWIDTH] IN BLOOD BY AUTOMATED COUNT: 12.2 % (ref 12.3–15.4)
GLOBULIN SER CALC-MCNC: 2.3 GM/DL
GLUCOSE SERPL-MCNC: 88 MG/DL (ref 65–99)
HCT VFR BLD AUTO: 40.5 % (ref 34–46.6)
HGB BLD-MCNC: 13.6 G/DL (ref 12–15.9)
IMM GRANULOCYTES # BLD AUTO: 0.01 10*3/MM3 (ref 0–0.05)
IMM GRANULOCYTES NFR BLD AUTO: 0.2 % (ref 0–0.5)
LYMPHOCYTES # BLD AUTO: 1.15 10*3/MM3 (ref 0.7–3.1)
LYMPHOCYTES NFR BLD AUTO: 24.6 % (ref 19.6–45.3)
MCH RBC QN AUTO: 30.8 PG (ref 26.6–33)
MCHC RBC AUTO-ENTMCNC: 33.6 G/DL (ref 31.5–35.7)
MCV RBC AUTO: 91.8 FL (ref 79–97)
MONOCYTES # BLD AUTO: 0.38 10*3/MM3 (ref 0.1–0.9)
MONOCYTES NFR BLD AUTO: 8.1 % (ref 5–12)
NEUTROPHILS # BLD AUTO: 2.97 10*3/MM3 (ref 1.7–7)
NEUTROPHILS NFR BLD AUTO: 63.7 % (ref 42.7–76)
NRBC BLD AUTO-RTO: 0 /100 WBC (ref 0–0.2)
PLATELET # BLD AUTO: 322 10*3/MM3 (ref 140–450)
POTASSIUM SERPL-SCNC: 4.8 MMOL/L (ref 3.5–5.2)
PROT SERPL-MCNC: 6.6 G/DL (ref 6–8.5)
RBC # BLD AUTO: 4.41 10*6/MM3 (ref 3.77–5.28)
SODIUM SERPL-SCNC: 140 MMOL/L (ref 136–145)
TSH SERPL DL<=0.005 MIU/L-ACNC: 1.08 UIU/ML (ref 0.27–4.2)
WBC # BLD AUTO: 4.67 10*3/MM3 (ref 3.4–10.8)

## 2024-07-02 ENCOUNTER — LAB (OUTPATIENT)
Dept: LAB | Facility: HOSPITAL | Age: 89
End: 2024-07-02
Payer: MEDICARE

## 2024-07-02 ENCOUNTER — OFFICE VISIT (OUTPATIENT)
Dept: ONCOLOGY | Facility: CLINIC | Age: 89
End: 2024-07-02
Payer: MEDICARE

## 2024-07-02 VITALS
DIASTOLIC BLOOD PRESSURE: 72 MMHG | RESPIRATION RATE: 16 BRPM | OXYGEN SATURATION: 97 % | HEIGHT: 66 IN | WEIGHT: 125.6 LBS | SYSTOLIC BLOOD PRESSURE: 128 MMHG | TEMPERATURE: 97.5 F | BODY MASS INDEX: 20.18 KG/M2 | HEART RATE: 61 BPM

## 2024-07-02 DIAGNOSIS — C50.919 MALIGNANT NEOPLASM OF BREAST IN FEMALE, ESTROGEN RECEPTOR POSITIVE, UNSPECIFIED LATERALITY, UNSPECIFIED SITE OF BREAST: ICD-10-CM

## 2024-07-02 DIAGNOSIS — Z12.31 SCREENING MAMMOGRAM FOR BREAST CANCER: ICD-10-CM

## 2024-07-02 DIAGNOSIS — Z17.0 MALIGNANT NEOPLASM OF BREAST IN FEMALE, ESTROGEN RECEPTOR POSITIVE, UNSPECIFIED LATERALITY, UNSPECIFIED SITE OF BREAST: Primary | ICD-10-CM

## 2024-07-02 DIAGNOSIS — C50.919 MALIGNANT NEOPLASM OF BREAST IN FEMALE, ESTROGEN RECEPTOR POSITIVE, UNSPECIFIED LATERALITY, UNSPECIFIED SITE OF BREAST: Primary | ICD-10-CM

## 2024-07-02 DIAGNOSIS — Z17.0 MALIGNANT NEOPLASM OF BREAST IN FEMALE, ESTROGEN RECEPTOR POSITIVE, UNSPECIFIED LATERALITY, UNSPECIFIED SITE OF BREAST: ICD-10-CM

## 2024-07-02 LAB
ALBUMIN SERPL-MCNC: 4 G/DL (ref 3.5–5.2)
ALBUMIN/GLOB SERPL: 1.5 G/DL
ALP SERPL-CCNC: 54 U/L (ref 39–117)
ALT SERPL W P-5'-P-CCNC: <5 U/L (ref 1–33)
ANION GAP SERPL CALCULATED.3IONS-SCNC: 9.7 MMOL/L (ref 5–15)
AST SERPL-CCNC: 17 U/L (ref 1–32)
BASOPHILS # BLD AUTO: 0.07 10*3/MM3 (ref 0–0.2)
BASOPHILS NFR BLD AUTO: 1.2 % (ref 0–1.5)
BILIRUB SERPL-MCNC: 0.5 MG/DL (ref 0–1.2)
BUN SERPL-MCNC: 24 MG/DL (ref 8–23)
BUN/CREAT SERPL: 22 (ref 7–25)
CALCIUM SPEC-SCNC: 9.4 MG/DL (ref 8.2–9.6)
CHLORIDE SERPL-SCNC: 107 MMOL/L (ref 98–107)
CO2 SERPL-SCNC: 27.3 MMOL/L (ref 22–29)
CREAT SERPL-MCNC: 1.09 MG/DL (ref 0.57–1)
DEPRECATED RDW RBC AUTO: 45.7 FL (ref 37–54)
EGFRCR SERPLBLD CKD-EPI 2021: 48.1 ML/MIN/1.73
EOSINOPHIL # BLD AUTO: 0.13 10*3/MM3 (ref 0–0.4)
EOSINOPHIL NFR BLD AUTO: 2.3 % (ref 0.3–6.2)
ERYTHROCYTE [DISTWIDTH] IN BLOOD BY AUTOMATED COUNT: 13 % (ref 12.3–15.4)
GLOBULIN UR ELPH-MCNC: 2.7 GM/DL
GLUCOSE SERPL-MCNC: 118 MG/DL (ref 65–99)
HCT VFR BLD AUTO: 41.3 % (ref 34–46.6)
HGB BLD-MCNC: 13.4 G/DL (ref 12–15.9)
IMM GRANULOCYTES # BLD AUTO: 0.01 10*3/MM3 (ref 0–0.05)
IMM GRANULOCYTES NFR BLD AUTO: 0.2 % (ref 0–0.5)
LYMPHOCYTES # BLD AUTO: 1.5 10*3/MM3 (ref 0.7–3.1)
LYMPHOCYTES NFR BLD AUTO: 26.1 % (ref 19.6–45.3)
MCH RBC QN AUTO: 30.9 PG (ref 26.6–33)
MCHC RBC AUTO-ENTMCNC: 32.4 G/DL (ref 31.5–35.7)
MCV RBC AUTO: 95.4 FL (ref 79–97)
MONOCYTES # BLD AUTO: 0.37 10*3/MM3 (ref 0.1–0.9)
MONOCYTES NFR BLD AUTO: 6.4 % (ref 5–12)
NEUTROPHILS NFR BLD AUTO: 3.67 10*3/MM3 (ref 1.7–7)
NEUTROPHILS NFR BLD AUTO: 63.8 % (ref 42.7–76)
NRBC BLD AUTO-RTO: 0 /100 WBC (ref 0–0.2)
PLATELET # BLD AUTO: 258 10*3/MM3 (ref 140–450)
PMV BLD AUTO: 9.4 FL (ref 6–12)
POTASSIUM SERPL-SCNC: 4.8 MMOL/L (ref 3.5–5.2)
PROT SERPL-MCNC: 6.7 G/DL (ref 6–8.5)
RBC # BLD AUTO: 4.33 10*6/MM3 (ref 3.77–5.28)
SODIUM SERPL-SCNC: 144 MMOL/L (ref 136–145)
WBC NRBC COR # BLD AUTO: 5.75 10*3/MM3 (ref 3.4–10.8)

## 2024-07-02 PROCEDURE — 80053 COMPREHEN METABOLIC PANEL: CPT

## 2024-07-02 PROCEDURE — 85025 COMPLETE CBC W/AUTO DIFF WBC: CPT

## 2024-07-02 PROCEDURE — 36415 COLL VENOUS BLD VENIPUNCTURE: CPT

## 2024-07-02 NOTE — PROGRESS NOTES
Subjective       REASON FOR FOLLOW UP:    1. pT1c pNX Invasive Lobular Carcinoma of the Left Breast measuring 20 mm, grade II.  Her tumor was ER+ 96%, NH+ 96%, and HER2/clayton Negative (0) with Ki67 of 3.34%.      2.  Patient was to start Arimidex February 2020 but has not started.  On further discussion she wants to avoid any medications including tamoxifen.    3.  Currently receiving radiation     4.  Osteopenia, had a bone density which I have reviewed with the patient.                               HISTORY OF PRESENT ILLNESS:        Patient is a 88-year-old female with history of T1c NX invasive lobular carcinoma of the left breast, 20 mm, ER NH positive HER-2/clayton negative who was to start Arimidex . Patient had refused any endocrine therapy and currently is followed with observation. She does do her self breast exam monthly. Her last screening mammogram was May 2021 which was negative. She did fall and hurt her arm and had to undergo surgery in September 2021. So she still has pain and has to undergo physical therapy.    Patient had CT chest ordered by her primary care physician because of shortness of breath which showed there was some small airspace consolidation in the right apex which was not present in 2006 but age indeterminate. It could be acute or subacute infectious infiltrate but a conservative surveillance with repeat CT suggested in 3 months. The parenchymal and pleural thickening in the anterior aspect of the left upper lobe was likely secondary to left breast radiation. Patient has follow-up appointment with pulmonary physician in the next couple weeks.    Interval history:  Patient is 89-year-old female with history of T1CNX invasive lobular carcinoma left breast ER/NH positive HER2 negative.  She did not want to start any endocrine therapy and hence followed with observation since her diagnosis in 2020.  Currently she is doing well.  [facility]    July 2, 2024: Currently patient is being followed with observation.  Given her age she really did not want to take any endocrine therapy which is reasonable.  She has no new issues though she has chronic fatigue.      Oncologic history:  The patient is a 89 y.o. year old female who is here for an opinion about the above issue.    She is currently followed by PCP Dr. Yuniel Bonilla.  Patient had never had mammograms since 2013.  In October 2019 she noted indentation of the skin in the breast.  She did not have pain but she did feel a marble size lump at the time.  She had a screening mammogram in November 2019 which had revealed a 12 mm mass in the middle one-third region of the left breast at 1:00 position 4 cmfn.  She, then, went for a diagnostic mammogram and ultrasound in December.  The mammogram revealed an irregular 15 mm mass in the left breast at the 12:00 position and the ultrasound revealed an irregular [id number] mm mass in the left breast at the 12:00 position 4 cmfn and an oval elongated mass measuring 7x3x5 mm in the left breast at the 1:00 position.  She was referred to Dr. Plasencia who performed an US-guided biopsy which showed Invasive Carcinoma with Ductal and Lobular features, low grade, measuring 15 mm, ER+ 96%, NE+ 96%, HER2/clayton negative (0), Ki67 3%.  The mass at the 1:00 position was a fibroadenoma.  She underwent a left breast lumpectomy on February 5, 2020 which confirmed Invasive Lobular Carcinoma of the Left Breast measuring 20 mm, grade II.  Her tumor was ER+ 96%, NE+ 96%, and HER2/clayton Negative (0).    Her personal history consists of vertigo, skin cancer of right leg in 2008 and of left nose in 2015.  She had a melanoma in 2017.    Her family history consists of her sister who had breast cancer at age 46.  Her maternal grandmother had breast cancer in her 70s.  Her daughter has chronic lymphocytic leukemia.        Details of the diagnostics scans and procedures are as  follows:    11/27/19 - Screening Mammogram  Finding 1: There is a new irregular mass measuring 12 mm with spiculated margins seen inthe middle one-third region of the left beast at the 1:00 position located 4 cmfn.    Finding 2: There are vascular calcifications seen in both breasts.  No suspicious masses in the right breast.    12/26/19 - Diagnostic Mammogram  Finding 1: There is an irregular mass measuring 15 mm with spiculated margins in the upper outer region of the left breast 4 cmfn.      Ultrasound:  Finding 1: Irregular hypoechoic mass measuring [id number] mm at 12:00 4cmfn.    Finding 2: There is an oval elongated mass with indistinct margins measuring 7x3x5 mm in the left breast at 1:00, 1.7 cm from the mass at 12:00.    01/09/20 - US-Guided Biopsy  Left Breast 12:00 Position   Invasive Carcinoma with Ductal and Lobular features, low grade, measuring 15 mm, ER+ 96%, UT+ 96%, HER2/clayton negative (0), Ki67 3%    Left Breast 1:00 Position  Fibroadenoma    02/05/20 - Left Breast Lumpectomy   A. Invasive lobular carcinoma:                            1. Invasive carcinoma measures 20 mm x  14 mm x 10 mm.                            2. Overall Hancock grade II (tubular score = 3, nuclear score = 2,                                mitotic score = 1).                            3. No lymphovascular invasion identified.                            4. Microcalcifications are present in the invasive component.               B. No carcinoma in situ component identified.               C. All margins are negative for invasive carcinoma.                    Carcinoma measures 2.0 mm from the closest (Medial) margin of excision.                     All other margins measure at least 4.0 mm from invasive carcinoma including:                            Anterior margin = 5 mm                            Posterior margin = 10 mm                            Superior margin = 20 mm                            Inferior margin =15mm                              Lateral margin = 4 mm                            Medial margin = 2 mm                  D. Rare focus of atypical lobular hyperplasia (ALH) noted, Not present at the margin. Focal fibroadenoma                    involved by invasive lobular carcinoma noted, Not present at the margin.               E. Focal biopsy site changes are identified.               F. Focal atypical ductal hyperplasia noted, not present at the margin.               G. Non-neoplastic breast tissue with intraductal papilloma, adenosis and fibrocystic change noted.                    Microcalcification noted in benign breast tissue.               H. Previous Biomarkers: Estrogen receptors: positive (95.53%), Progesterone receptors: positive (96.15%),                    HER/2-clayton: negative (0), Ki-67 = 3.34%    TUMOR   Tumor Site  Upper outer quadrant    Histologic Type  Invasive lobular carcinoma    Glandular (Acinar) / Tubular Differentiation  Score 3    Nuclear Pleomorphism  Score 2    Mitotic Rate  Score 1    Overall Grade  Grade 2 (scores of 6 or 7)    Tumor Size  Greatest dimension of largest invasive focus (Millimeters): 20 mm   Additional Dimension (Millimeters)  14 mm     10 mm   Tumor Focality  Single focus of invasive carcinoma    Ductal Carcinoma In Situ (DCIS)  Not identified    Lobular Carcinoma In Situ (LCIS)  No LCIS in specimen    Tumor Extent     Lymphovascular Invasion  Not identified    Dermal Lymphovascular Invasion  No skin present    Microcalcifications  Present in invasive carcinoma      Present in non-neoplastic tissue    Treatment Effect  No known presurgical therapy    MARGINS   Invasive Carcinoma Margins  Uninvolved by invasive carcinoma    Distance from Closest Margin (Millimeters)  5 mm   Closest Margin  Anterior    Distance from Other Margins     Anterior Margin (Millimeters)  5 mm   Posterior Margin (Millimeters)  10 mm   Superior Margin (Millimeters)  20 mm   Inferior Margin (Millimeters)  15  "mm   Medial Margin (Millimeters)  8 mm   Lateral Margin (Millimeters)  10 mm   LYMPH NODES   Regional Lymph Nodes  No lymph nodes submitted or found    PATHOLOGIC STAGE CLASSIFICATION (pTNM, AJCC 8th Edition)   Primary Tumor (pT)  pT1c    Regional Lymph Nodes (pN)     Category (pN)  pNX    SPECIAL STUDIES   Breast Biomarker Testing Performed on Previous Biopsy     Estrogen Receptor (ER)  Positive (percentage): 95.5 %   Breast Biomarker Testing Performed on Previous Biopsy     Progesterone Receptor (PgR)  Positive (percentage): 96.2 %   Breast Biomarker Testing Performed on Previous Biopsy     HER2 (by immunohistochemistry)  Negative (Score 0)    Testing Performed on Case Number  [id number] (Not reviewed)    Comment(s)   Comment(s)  Ki67= 3.34%      Patient refused endocrine therapy    Currently on radiation          Past Medical History:   Diagnosis Date    Anxiety     Arthritis     Cancer 01/2020    left breast    Cancer of skin of leg, right 2021    Coronary artery disease     \"BLOCKAGE IN MY LEGS\" stents bilateral legs per pt    COVID-19 virus infection 09/13/2021    Hemorrhoids     History of basal cell cancer     History of headaches     Humerus fracture     LEFT     Hypertension     Hypothyroidism     Melanoma 2017    LEFT LEG    Osteopenia     PAD (peripheral artery disease)     Skin cancer 2015    left nose    Skin cancer 2008    right leg    Urinary tract infection 10/12/22    Treated in October    Vertigo     Vestibular migraine 2021        Past Surgical History:   Procedure Laterality Date    BREAST LUMPECTOMY WITH SENTINEL NODE BIOPSY Left 2/5/2020    Procedure: BREAST LUMPECTOMY WITH NEEDLE LOCALIZATION;  Surgeon: Kush Plasencia MD;  Location: [facility];  Service: General;  Laterality: Left;    BREAST SURGERY Left 2020    BIOPSY    COLONOSCOPY      ALL OK    COSMETIC SURGERY      AFTER SKIN EXCISION FROM NOSE    ENDOSCOPY      EYE SURGERY Bilateral     CATARACTS    FRACTURE SURGERY  9/23/21    " LEG SURGERY Left 2017    MELANOMA EXCISION-WITH SKIN GRAFT    ORIF HUMERUS FRACTURE Left 2021    Procedure: Open Reduction and Internal Fixation Proximal Humerus;  Surgeon: Sherman Panchal MD;  Location: [facility];  Service: Orthopedics;  Laterality: Left;    SKIN BIOPSY  2008    SKIN CANCER EXCISION Bilateral 2015    NOSE, UPPER LIP    SKIN GRAFT Bilateral     VASCULAR SURGERY Bilateral 2018    STENTS IN BOTH UPPER LEGS        Current Outpatient Medications on File Prior to Visit   Medication Sig Dispense Refill    amLODIPine (NORVASC) 2.5 MG tablet TAKE ONE TABLET BY MOUTH EVERY MORNING 90 tablet 3    aspirin 81 MG chewable tablet Chew 1 tablet Daily.      Atogepant (Qulipta) 60 MG tablet Take 1 tablet by mouth Daily. 30 tablet 0    Atogepant (Qulipta) 60 MG tablet Take 1 tablet by mouth Daily.      calcium carbonate (OS-PAUL) 600 MG tablet Take 1 tablet by mouth Daily.      cholecalciferol (VITAMIN D3) 25 MCG (1000 UT) tablet Take 1 tablet by mouth Daily.      levothyroxine (SYNTHROID, LEVOTHROID) 75 MCG tablet TAKE ONE TABLET BY MOUTH DAILY 90 tablet 3    magnesium oxide (MAGOX) 400 (241.3 Mg) MG tablet tablet Take 1 tablet by mouth Daily.      meclizine (ANTIVERT) 25 MG tablet Take 1 tablet by mouth 3 (Three) Times a Day As Needed for Dizziness. 90 tablet 3    Riboflavin (VITAMIN B2 PO) Take 1 tablet by mouth Daily.       No current facility-administered medications on file prior to visit.        ALLERGIES:    Allergies   Allergen Reactions    Codeine Mental Status Change        Social History     Socioeconomic History    Marital status:      Spouse name: Vladimir    Number of children: 4   Tobacco Use    Smoking status: Former     Current packs/day: 0.00     Average packs/day: 1 pack/day for 20.0 years (20.0 ttl pk-yrs)     Types: Cigarettes     Start date: 1950     Quit date: 1970     Years since quittin.5    Smokeless tobacco: Never    Tobacco comments:     20 years. None     Vaping Use    Vaping status: Never Used   Substance and Sexual Activity    Alcohol use: Never    Drug use: Never    Sexual activity: Not Currently     Partners: Male        Family History   Problem Relation Age of Onset    Heart failure Father     Skin cancer Father     Heart disease Father     Breast cancer Sister 46    Heart disease Sister     Esophageal cancer Brother     Heart disease Brother     Leukemia Daughter     Breast cancer Maternal Grandmother         70s    Colon cancer Maternal Grandmother     Cancer Sister         Breast    Cancer Brother         Esophageal    Malig Hyperthermia Neg Hx         Review of Systems   Constitutional: Negative.  Negative for appetite change, chills, diaphoresis, fatigue, fever and unexpected weight change.   HENT:  Negative for hearing loss, sore throat and trouble swallowing.    Eyes: Negative.    Respiratory:  Positive for shortness of breath. Negative for cough, chest tightness and wheezing.    Cardiovascular: Negative.  Negative for chest pain, palpitations and leg swelling.   Gastrointestinal: Negative.  Negative for abdominal distention, abdominal pain, constipation, diarrhea, nausea and vomiting.   Endocrine: Negative.    Genitourinary: Negative.  Negative for dysuria, frequency, hematuria and urgency.   Musculoskeletal: Negative.  Negative for joint swelling.        No muscle weakness.   Skin: Negative.  Negative for rash and wound.   Allergic/Immunologic: Negative.    Neurological:  Negative for seizures, syncope, speech difficulty, weakness and numbness. Dizziness: 11/24/20-Unchanged. Headaches: 11/24/20-Unchanged.  Hematological: Negative.  Negative for adenopathy. Does not bruise/bleed easily.   Psychiatric/Behavioral: Negative.  Negative for behavioral problems, confusion and suicidal ideas.    All other systems reviewed and are negative.  Patient has vertigo stable  I  I have reviewed and confirmed the accuracy of the ROS as documented by the MA/LPN/RN  "Sowmya Faulkner MD        Objective     Vitals:    07/02/24 1411   BP: 128/72   Pulse: 61   Resp: 16   Temp: 97.5 °F (36.4 °C)   TempSrc: Oral   SpO2: 97%   Weight: 57 kg (125 lb 9.6 oz)   Height: 167.6 cm (65.98\")   PainSc: 0-No pain             7/2/2024     2:08 PM   Current Status   ECOG score 0     Physical examination      This patient's ACP documentation is up to date, and there's nothing further left to document.      CONSTITUTIONAL:  Vital signs reviewed.  No distress, looks comfortable.  BREAST: Right breast: No skin changes, no evidence of breast mass, no nipple discharge, no evidence of any right axillary adenopathy or right supraclavicular adenopathy  Left breast: No evidence of any skin changes, no evidence of any left breast mass and no evidence of left nipple discharge as well as no left axillary adenopathy or left supraclavicular adenopathy.  RESPIRATORY:  Normal respiratory effort.  Lungs clear to auscultation bilaterally.  CARDIOVASCULAR:  Normal S1, S2.  No murmurs rubs or gallops.  No significant lower extremity edema.  GASTROINTESTINAL: Abdomen appears unremarkable.  Nontender.  No hepatomegaly.  No splenomegaly.  LYMPHATIC:  No cervical, supraclavicular, axillary lymphadenopathy.  SKIN:  Warm.  No rashes.  PSYCHIATRIC:  Normal judgment and insight.  Normal mood and affect.      I have reexamined the patient and the results are consistent with the previously documented exam. Sowmya Faulkner MD         RECENT LABS:  Hematology WBC   Date Value Ref Range Status   07/02/2024 5.75 3.40 - 10.80 10*3/mm3 Final   06/13/2024 4.67 3.40 - 10.80 10*3/mm3 Final   09/17/2018 6.22 4.5 - 11.0 10*3/uL Final     RBC   Date Value Ref Range Status   07/02/2024 4.33 3.77 - 5.28 10*6/mm3 Final   06/13/2024 4.41 3.77 - 5.28 10*6/mm3 Final   09/17/2018 4.34 4.0 - 5.2 10*6/uL Final     Hemoglobin   Date Value Ref Range Status   07/02/2024 13.4 12.0 - 15.9 g/dL Final   09/17/2018 13.2 12.0 - 16.0 g/dL Final "     Hematocrit   Date Value Ref Range Status   07/02/2024 41.3 34.0 - 46.6 % Final   09/17/2018 41.3 36.0 - 46.0 % Final     Platelets   Date Value Ref Range Status   07/02/2024 258 140 - 450 10*3/mm3 Final   09/17/2018 457 (H) 140 - 440 10*3/uL Final          Assessment & Plan     pT1c pNX Invasive Lobular Carcinoma of the Left Breast measuring 20 mm, grade II.  Her tumor was ER+ 96%, WI+ 96%, and HER2/clayton Negative (0) with Ki67 of 3.34%.    -Screening mammogram, 12 mm mass, spiculated margins left breast, 1 o'clock position, 4 cm from the nipple  -Diagnostic mammogram, irregular 15 mm mass left breast , 4 cm from the nipple  -Ultrasound showed irregular mass 15 x 13 x 13 at 12:00, oval mass with indistinct margin 7 x 3 x 5 mm left breast at 1:00  -Left breast ultrasound-guided biopsy, 12 o'clock position, invasive carcinoma with ductal and lobular features  -left breast C, 1 o'clock position, fibroadenoma  -February 5, 2020, left breast lumpectomy, invasive lobular carcinoma, 20 x 14 x 10 mm, grade 2, Bekah score of 5 no lymphovascular space invasion, no evidence of DCIS, margins are all negative, rare focus of atypical lobular hyperplasia, not present at margin,    I had a lengthy discussion with the patient and family that at the age of 86 years given that this is highly ER WI positive tumor, HER-2 negative, she is not a candidate for any chemotherapy given her age.  I do not see any need to obtain an Oncotype DX in the setting.  She is certainly a candidate for endocrine therapy.  My question would be if sentinel lymph node has to be considered given that she had a 20 mm invasive lobular carcinoma.  However patient is being seen by radiation oncology and will get input from them as well to see if axilla would be included.  Will discuss with Dr. Plasencia.  Patient to consult radiation oncology for opinion on 03/03/20.  Given patient's cancer was ER/WI+, she will certainly benefit from endocrine therapy.   Given her past medical history and current health, I think Arimidex would be the best choice of treatment.  Patient has refused to undergo any endocrine therapy after explaining the details of the side effects.  She is to follow-up with radiation oncology and so far 17 out of 21 treatments given  Patient has refused endocrine therapy even after discussing in length.  June 28, 2023: Patient without complaints.  Patient's last mammogram was December 12, 2022 which was negative.  Since patient's sister and grandmother had breast cancer we had discussed about consideration of genetic testing but patient had refused.  She is not on endocrine therapy and is currently followed with observation.  She is 3 years out and doing reasonably well  December 26, 2023: Currently doing very well without any new problems.  Reviewed mammogram from last week December 2023 and it is negative.  July 2, 2024: Patient had mammogram December 2023 which is negative.  She is currently followed with observation.    Family history of malignancy.  Her family history consists of her sister who had breast cancer at age 46.  Her maternal grandmother had breast cancer in her 70s.  Her daughter has chronic lymphocytic leukemia.  Genetic testing could be considered to see if that information benefits the rest of the family.      Patient refused genetic testing    History of skin cancer in the right leg (2008) and left nose (2015).    History of Melanoma of the left leg (2017).  Patient to follow with dermatology    Vertigo.  Patient reports feeling dizzy constantly.  Stable  Osteopenia: Patient to take calcium and vitamin D supplements.  Discussed either oral bisphosphonates like Actonel or Fosamax or Prolia injectable but patient refuses  Last bone density March 2020 and she is not due till March 2022. She has osteopenia but refused any bisphosphonates    Fatigue since radiation likely related to radiation  Chronic bilateral parenchymal  infiltrates,            PLAN:  Patient is followed with observation  Currently no evidence of disease  Reviewed mammogram from December 2023 which is negative  Patient has refused endocrine therapy  Follow-up with me in 6 months with labs    Sowmya Faulkner MD       Cc:  MD Sima Costa MD Stephen Self, MD Stuart Spalding, MD

## 2024-12-10 ENCOUNTER — OFFICE VISIT (OUTPATIENT)
Dept: FAMILY MEDICINE CLINIC | Facility: CLINIC | Age: 89
End: 2024-12-10
Payer: MEDICARE

## 2024-12-10 VITALS
HEART RATE: 71 BPM | BODY MASS INDEX: 19.7 KG/M2 | DIASTOLIC BLOOD PRESSURE: 62 MMHG | WEIGHT: 122.6 LBS | SYSTOLIC BLOOD PRESSURE: 126 MMHG | HEIGHT: 66 IN | OXYGEN SATURATION: 97 % | TEMPERATURE: 97.3 F

## 2024-12-10 DIAGNOSIS — R30.0 DYSURIA: ICD-10-CM

## 2024-12-10 DIAGNOSIS — K52.9 ACUTE GASTROENTERITIS: ICD-10-CM

## 2024-12-10 DIAGNOSIS — Z00.00 MEDICARE ANNUAL WELLNESS VISIT, SUBSEQUENT: Primary | ICD-10-CM

## 2024-12-10 PROCEDURE — G0439 PPPS, SUBSEQ VISIT: HCPCS | Performed by: FAMILY MEDICINE

## 2024-12-10 PROCEDURE — 1170F FXNL STATUS ASSESSED: CPT | Performed by: FAMILY MEDICINE

## 2024-12-10 PROCEDURE — 99213 OFFICE O/P EST LOW 20 MIN: CPT | Performed by: FAMILY MEDICINE

## 2024-12-10 PROCEDURE — 1126F AMNT PAIN NOTED NONE PRSNT: CPT | Performed by: FAMILY MEDICINE

## 2024-12-10 RX ORDER — SULFAMETHOXAZOLE AND TRIMETHOPRIM 800; 160 MG/1; MG/1
1 TABLET ORAL 2 TIMES DAILY
Qty: 10 TABLET | Refills: 0 | Status: SHIPPED | OUTPATIENT
Start: 2024-12-10

## 2024-12-10 RX ORDER — CIPROFLOXACIN 500 MG/1
500 TABLET, FILM COATED ORAL 2 TIMES DAILY
COMMUNITY
Start: 2024-12-06

## 2024-12-10 NOTE — PROGRESS NOTES
Subjective   The ABCs of the Annual Wellness Visit  Medicare Wellness Visit      Mela Muse is a 89 y.o. patient who presents for a Medicare Wellness Visit.    The following portions of the patient's history were reviewed and   updated as appropriate: allergies, current medications, past family history, past medical history, past social history, past surgical history, and problem list.    Compared to one year ago, the patient's physical   health is the same.  Compared to one year ago, the patient's mental   health is the same.    Recent Hospitalizations:  She was not admitted to the hospital during the last year.     Current Medical Providers:  Patient Care Team:  Yuniel Bonilla MD as PCP - General  Yuniel Bonilla MD as PCP - Family Medicine  Self, Khoa Quick MD (Vascular Surgery)  Sima Joiner MD as Consulting Physician (Radiation Oncology)  Sowmya Faulkner MD as Consulting Physician (Hematology and Oncology)  Kush Plasencia MD as Referring Physician (Breast Surgery)    Outpatient Medications Prior to Visit   Medication Sig Dispense Refill    amLODIPine (NORVASC) 2.5 MG tablet TAKE ONE TABLET BY MOUTH EVERY MORNING 90 tablet 3    aspirin 81 MG chewable tablet Chew 1 tablet Daily.      Atogepant (Qulipta) 60 MG tablet Take 1 tablet by mouth Daily. 30 tablet 0    calcium carbonate (OS-PAUL) 600 MG tablet Take 1 tablet by mouth Daily.      cholecalciferol (VITAMIN D3) 25 MCG (1000 UT) tablet Take 1 tablet by mouth Daily.      ciprofloxacin (CIPRO) 500 MG tablet Take 1 tablet by mouth 2 (Two) Times a Day.      levothyroxine (SYNTHROID, LEVOTHROID) 75 MCG tablet TAKE ONE TABLET BY MOUTH DAILY 90 tablet 3    magnesium oxide (MAGOX) 400 (241.3 Mg) MG tablet tablet Take 1 tablet by mouth Daily.      meclizine (ANTIVERT) 25 MG tablet Take 1 tablet by mouth 3 (Three) Times a Day As Needed for Dizziness. 90 tablet 3    Riboflavin (VITAMIN B2 PO) Take 1 tablet by mouth Daily.      Atogepant  (Qulipta) 60 MG tablet Take 1 tablet by mouth Daily.       No facility-administered medications prior to visit.     No opioid medication identified on active medication list. I have reviewed chart for other potential  high risk medication/s and harmful drug interactions in the elderly.      Aspirin is on active medication list. Aspirin use is indicated based on review of current medical condition/s. Pros and cons of this therapy have been discussed today. Benefits of this medication outweigh potential harm.  Patient has been encouraged to continue taking this medication.  .      Patient Active Problem List   Diagnosis    Malignant neoplasm of breast in female, estrogen receptor positive    Vertigo    History of skin cancer    History of melanoma    Osteoporosis    Post-menopausal    Dizziness    Essential hypertension    PAD (peripheral artery disease)    Claudication of both lower extremities    Asymptomatic bilateral carotid artery stenosis    Osteopenia of hip    Other fatigue    Medicare annual wellness visit, subsequent    Acquired hypothyroidism    Left-sided chest pain    COVID-19    Closed fracture of left proximal humerus    Shortness of breath    Left upper lobe pulmonary infiltrate    COPD (chronic obstructive pulmonary disease)    Dysuria    Chronic fatigue    Sequelae of protein-calorie malnutrition    Vitamin D deficiency, unspecified    Abdominal pain    Thrombocytosis    Immunization deficiency    Screening mammogram for breast cancer    Migraine without aura and without status migrainosus, not intractable    Acute gastroenteritis     Advance Care Planning Advance Directive is on file.  ACP discussion was held with the patient during this visit. Patient has an advance directive in EMR which is still valid.             Objective   Vitals:    12/10/24 1412   BP: 126/62   BP Location: Left arm   Patient Position: Sitting   Cuff Size: Adult   Pulse: 71   Temp: 97.3 °F (36.3 °C)   TempSrc: Temporal  "  SpO2: 97%   Weight: 55.6 kg (122 lb 9.6 oz)   Height: 167.6 cm (66\")       Estimated body mass index is 19.79 kg/m² as calculated from the following:    Height as of this encounter: 167.6 cm (66\").    Weight as of this encounter: 55.6 kg (122 lb 9.6 oz).    BMI is within normal parameters. No other follow-up for BMI required.       Does the patient have evidence of cognitive impairment? No                                                                                                Health  Risk Assessment    Smoking Status:  Social History     Tobacco Use   Smoking Status Former    Current packs/day: 0.00    Average packs/day: 1 pack/day for 20.0 years (20.0 ttl pk-yrs)    Types: Cigarettes    Start date: 1950    Quit date: 1970    Years since quittin.9   Smokeless Tobacco Never   Tobacco Comments    20 years. None      Alcohol Consumption:  Social History     Substance and Sexual Activity   Alcohol Use Never       Fall Risk Screen  STEADI Fall Risk Assessment was completed, and patient is at LOW risk for falls.Assessment completed on:12/10/2024    Depression Screening   Little interest or pleasure in doing things? Not at all   Feeling down, depressed, or hopeless? Not at all   PHQ-2 Total Score 0      Health Habits and Functional and Cognitive Screenin/10/2024     2:15 PM   Functional & Cognitive Status   Do you have difficulty preparing food and eating? No   Do you have difficulty bathing yourself, getting dressed or grooming yourself? No   Do you have difficulty using the toilet? No   Do you have difficulty moving around from place to place? No   Do you have trouble with steps or getting out of a bed or a chair? No   Current Diet Well Balanced Diet   Dental Exam Up to date   Eye Exam Up to date   Exercise (times per week) 4 times per week   Current Exercises Include Weightlifting;Walking   Do you need help using the phone?  No   Are you deaf or do you have serious difficulty hearing? "  No   Do you need help to go to places out of walking distance? No   Do you need help shopping? No   Do you need help preparing meals?  No   Do you need help with housework?  No   Do you need help with laundry? No   Do you need help taking your medications? No   Do you need help managing money? No   Do you ever drive or ride in a car without wearing a seat belt? No   Have you felt unusual stress, anger or loneliness in the last month? No   Who do you live with? Alone   If you need help, do you have trouble finding someone available to you? No   Have you been bothered in the last four weeks by sexual problems? No   Do you have difficulty concentrating, remembering or making decisions? No           Age-appropriate Screening Schedule:  Refer to the list below for future screening recommendations based on patient's age, sex and/or medical conditions. Orders for these recommended tests are listed in the plan section. The patient has been provided with a written plan.    Health Maintenance List  Health Maintenance   Topic Date Due    TDAP/TD VACCINES (1 - Tdap) Never done    ZOSTER VACCINE (1 of 2) Never done    DXA SCAN  05/31/2024    INFLUENZA VACCINE  07/01/2024    COVID-19 Vaccine (7 - 2024-25 season) 09/01/2024    ANNUAL WELLNESS VISIT  12/10/2025    RSV Vaccine - Adults  Completed    Pneumococcal Vaccine 65+  Completed    MAMMOGRAM  Discontinued                                                                                                                                                CMS Preventative Services Quick Reference  Risk Factors Identified During Encounter  Inactivity/Sedentary: Patient was advised to exercise at least 150 minutes a week per CDC recommendations.    The above risks/problems have been discussed with the patient.  Pertinent information has been shared with the patient in the After Visit Summary.  An After Visit Summary and PPPS were made available to the patient.    Follow Up:   Next  "Medicare Wellness visit to be scheduled in 1 year.         Additional E&M Note during same encounter follows:  Patient has additional, significant, and separately identifiable condition(s)/problem(s) that require work above and beyond the Medicare Wellness Visit     Chief Complaint  Medicare Wellness-subsequent, Diarrhea, and Cystitis    Subjective   HPI  Mela is also being seen today for additional medical problem/s.        C/o 2 week history of loose Bms off and on.  C/o burning with urination for 2 weeks.  Unable to give a urine now.          Objective   Vital Signs:  /62 (BP Location: Left arm, Patient Position: Sitting, Cuff Size: Adult)   Pulse 71   Temp 97.3 °F (36.3 °C) (Temporal)   Ht 167.6 cm (66\")   Wt 55.6 kg (122 lb 9.6 oz)   SpO2 97%   BMI 19.79 kg/m²   Physical Exam                  Assessment and Plan            Medicare annual wellness visit, subsequent         Dysuria         Acute gastroenteritis      Dysuria.  Uncontrolled.  Pt unable to give sample.  Finish cipro.  Bactrim DS 1 BID for 5 days if no better.    AGE. Likely viral.  Push fluids.       Preventive Counseling:  Encouraged to stay active.  Covid vaccine/flu vaccine recommended.  Pneumovax UTD.    Dentist UTD.  Optho UTD.              Follow Up   Return in about 3 months (around 3/10/2025).  Patient was given instructions and counseling regarding her condition or for health maintenance advice. Please see specific information pulled into the AVS if appropriate.    "

## 2024-12-10 NOTE — ASSESSMENT & PLAN NOTE
Dysuria.  Uncontrolled.  Pt unable to give sample.  Finish cipro.  Bactrim DS 1 BID for 5 days if no better.    AGE. Likely viral.  Push fluids.       Preventive Counseling:  Encouraged to stay active.  Covid vaccine/flu vaccine recommended.  Pneumovax UTD.    Dentist UTD.  Optho UTD.

## 2025-01-28 ENCOUNTER — APPOINTMENT (OUTPATIENT)
Dept: WOMENS IMAGING | Facility: HOSPITAL | Age: OVER 89
End: 2025-01-28
Payer: MEDICARE

## 2025-01-28 PROCEDURE — 77067 SCR MAMMO BI INCL CAD: CPT | Performed by: RADIOLOGY

## 2025-01-28 PROCEDURE — 77063 BREAST TOMOSYNTHESIS BI: CPT | Performed by: RADIOLOGY

## 2025-01-29 NOTE — PROGRESS NOTES
Chief Complaint   Patient presents with    Follow-up     01/30/2025, Interval history:  Patient new to me.  Previous Dr. Faulkner patient.  She is here accompanied by her daughter.  Reports doing quite well.  She recently had a mammogram done.  Denies any other issues or concerns    Oncologic history:  The patient is a 89 y.o. year old female who is here for an opinion about the above issue.    She is currently followed by PCP Dr. Yuniel Bonilla.  Patient had never had mammograms since 2013.  In October 2019 she noted indentation of the skin in the breast.  She did not have pain but she did feel a marble size lump at the time.  She had a screening mammogram in November 2019 which had revealed a 12 mm mass in the middle one-third region of the left breast at 1:00 position 4 cmfn.  She, then, went for a diagnostic mammogram and ultrasound in December.  The mammogram revealed an irregular 15 mm mass in the left breast at the 12:00 position and the ultrasound revealed an irregular [id number] mm mass in the left breast at the 12:00 position 4 cmfn and an oval elongated mass measuring 7x3x5 mm in the left breast at the 1:00 position.  She was referred to Dr. Plasencia who performed an US-guided biopsy which showed Invasive Carcinoma with Ductal and Lobular features, low grade, measuring 15 mm, ER+ 96%, VT+ 96%, HER2/clayton negative (0), Ki67 3%.  The mass at the 1:00 position was a fibroadenoma.  She underwent a left breast lumpectomy on February 5, 2020 which confirmed Invasive Lobular Carcinoma of the Left Breast measuring 20 mm, grade II.  Her tumor was ER+ 96%, VT+ 96%, and HER2/clayton Negative (0).    Her personal history consists of vertigo, skin cancer of right leg in 2008 and of left nose in 2015.  She had a melanoma in 2017.    Her family history consists of her sister who had breast cancer at age 46.  Her maternal grandmother had breast cancer in her 70s.  Her daughter has chronic lymphocytic leukemia.        Details  of the diagnostics scans and procedures are as follows:    11/27/19 - Screening Mammogram  Finding 1: There is a new irregular mass measuring 12 mm with spiculated margins seen inthe middle one-third region of the left beast at the 1:00 position located 4 cmfn.    Finding 2: There are vascular calcifications seen in both breasts.  No suspicious masses in the right breast.    12/26/19 - Diagnostic Mammogram  Finding 1: There is an irregular mass measuring 15 mm with spiculated margins in the upper outer region of the left breast 4 cmfn.      Ultrasound:  Finding 1: Irregular hypoechoic mass measuring [id number] mm at 12:00 4cmfn.    Finding 2: There is an oval elongated mass with indistinct margins measuring 7x3x5 mm in the left breast at 1:00, 1.7 cm from the mass at 12:00.    01/09/20 - US-Guided Biopsy  Left Breast 12:00 Position   Invasive Carcinoma with Ductal and Lobular features, low grade, measuring 15 mm, ER+ 96%, MN+ 96%, HER2/clayton negative (0), Ki67 3%    Left Breast 1:00 Position  Fibroadenoma    02/05/20 - Left Breast Lumpectomy   A. Invasive lobular carcinoma:                            1. Invasive carcinoma measures 20 mm x  14 mm x 10 mm.                            2. Overall Bekah grade II (tubular score = 3, nuclear score = 2,                                mitotic score = 1).                            3. No lymphovascular invasion identified.                            4. Microcalcifications are present in the invasive component.               B. No carcinoma in situ component identified.               C. All margins are negative for invasive carcinoma.                    Carcinoma measures 2.0 mm from the closest (Medial) margin of excision.                     All other margins measure at least 4.0 mm from invasive carcinoma including:                            Anterior margin = 5 mm                            Posterior margin = 10 mm                            Superior margin = 20 mm                             Inferior margin =15mm                             Lateral margin = 4 mm                            Medial margin = 2 mm                  D. Rare focus of atypical lobular hyperplasia (ALH) noted, Not present at the margin. Focal fibroadenoma                    involved by invasive lobular carcinoma noted, Not present at the margin.               E. Focal biopsy site changes are identified.               F. Focal atypical ductal hyperplasia noted, not present at the margin.               G. Non-neoplastic breast tissue with intraductal papilloma, adenosis and fibrocystic change noted.                    Microcalcification noted in benign breast tissue.               H. Previous Biomarkers: Estrogen receptors: positive (95.53%), Progesterone receptors: positive (96.15%),                    HER/2-clayton: negative (0), Ki-67 = 3.34%    TUMOR   Tumor Site  Upper outer quadrant    Histologic Type  Invasive lobular carcinoma    Glandular (Acinar) / Tubular Differentiation  Score 3    Nuclear Pleomorphism  Score 2    Mitotic Rate  Score 1    Overall Grade  Grade 2 (scores of 6 or 7)    Tumor Size  Greatest dimension of largest invasive focus (Millimeters): 20 mm   Additional Dimension (Millimeters)  14 mm     10 mm   Tumor Focality  Single focus of invasive carcinoma    Ductal Carcinoma In Situ (DCIS)  Not identified    Lobular Carcinoma In Situ (LCIS)  No LCIS in specimen    Tumor Extent     Lymphovascular Invasion  Not identified    Dermal Lymphovascular Invasion  No skin present    Microcalcifications  Present in invasive carcinoma      Present in non-neoplastic tissue    Treatment Effect  No known presurgical therapy    MARGINS   Invasive Carcinoma Margins  Uninvolved by invasive carcinoma    Distance from Closest Margin (Millimeters)  5 mm   Closest Margin  Anterior    Distance from Other Margins     Anterior Margin (Millimeters)  5 mm   Posterior Margin (Millimeters)  10 mm   Superior Margin  (Millimeters)  20 mm   Inferior Margin (Millimeters)  15 mm   Medial Margin (Millimeters)  8 mm   Lateral Margin (Millimeters)  10 mm   LYMPH NODES   Regional Lymph Nodes  No lymph nodes submitted or found    PATHOLOGIC STAGE CLASSIFICATION (pTNM, AJCC 8th Edition)   Primary Tumor (pT)  pT1c    Regional Lymph Nodes (pN)     Category (pN)  pNX    SPECIAL STUDIES   Breast Biomarker Testing Performed on Previous Biopsy     Estrogen Receptor (ER)  Positive (percentage): 95.5 %   Breast Biomarker Testing Performed on Previous Biopsy     Progesterone Receptor (PgR)  Positive (percentage): 96.2 %   Breast Biomarker Testing Performed on Previous Biopsy     HER2 (by immunohistochemistry)  Negative (Score 0)    Testing Performed on Case Number  [id number] (Not reviewed)    Comment(s)   Comment(s)  Ki67= 3.34%      Patient refused endocrine therapy    Currently on radiation                 Current Outpatient Medications on File Prior to Visit   Medication Sig Dispense Refill    amLODIPine (NORVASC) 2.5 MG tablet TAKE ONE TABLET BY MOUTH EVERY MORNING 90 tablet 3    aspirin 81 MG chewable tablet Chew 1 tablet Daily.      calcium carbonate (OS-PAUL) 600 MG tablet Take 1 tablet by mouth Daily.      cholecalciferol (VITAMIN D3) 25 MCG (1000 UT) tablet Take 1 tablet by mouth Daily.      levothyroxine (SYNTHROID, LEVOTHROID) 75 MCG tablet TAKE ONE TABLET BY MOUTH DAILY 90 tablet 3    magnesium oxide (MAGOX) 400 (241.3 Mg) MG tablet tablet Take 1 tablet by mouth Daily.      meclizine (ANTIVERT) 25 MG tablet Take 1 tablet by mouth 3 (Three) Times a Day As Needed for Dizziness. 90 tablet 3    Riboflavin (VITAMIN B2 PO) Take 1 tablet by mouth Daily.      Atogepant (Qulipta) 60 MG tablet Take 1 tablet by mouth Daily. (Patient not taking: Reported on 1/30/2025) 30 tablet 0    ciprofloxacin (CIPRO) 500 MG tablet Take 1 tablet by mouth 2 (Two) Times a Day. (Patient not taking: Reported on 1/30/2025)      sulfamethoxazole-trimethoprim (Bactrim  "DS) 800-160 MG per tablet Take 1 tablet by mouth 2 (Two) Times a Day. (Patient not taking: Reported on 1/30/2025) 10 tablet 0     No current facility-administered medications on file prior to visit.        ALLERGIES:    Allergies   Allergen Reactions    Codeine Mental Status Change          I have reviewed Past Medical, Surgical History, Social History, Meds and Allergies.     REVIEW OF SYSTEMS:  See interval History          Objective     Vitals:    01/30/25 1333   BP: 125/74   Pulse: 70   Resp: 16   Temp: 97.3 °F (36.3 °C)   TempSrc: Oral   SpO2: 98%   Weight: 54.6 kg (120 lb 4.8 oz)   Height: 167.6 cm (65.98\")   PainSc: 0-No pain               1/30/2025     1:30 PM   Current Status   ECOG score 0     Physical Exam  Constitutional:       Appearance: Normal appearance.   HENT:      Head: Normocephalic and atraumatic.      Mouth/Throat:      Mouth: Mucous membranes are moist.      Pharynx: Oropharynx is clear.   Eyes:      Extraocular Movements: Extraocular movements intact.      Pupils: Pupils are equal, round, and reactive to light.   Cardiovascular:      Rate and Rhythm: Normal rate and regular rhythm.   Pulmonary:      Effort: Pulmonary effort is normal.      Breath sounds: Normal breath sounds.   Chest:   Breasts:     Right: No mass, nipple discharge or skin change.      Left: No mass, nipple discharge or skin change.       Abdominal:      General: Bowel sounds are normal.      Palpations: Abdomen is soft.   Musculoskeletal:      Cervical back: Normal range of motion and neck supple.   Neurological:      General: No focal deficit present.      Mental Status: She is alert and oriented to person, place, and time.   Psychiatric:         Mood and Affect: Mood normal.         Behavior: Behavior normal.             RECENT LABS:  Hematology WBC   Date Value Ref Range Status   01/30/2025 7.27 3.40 - 10.80 10*3/mm3 Final   06/13/2024 4.67 3.40 - 10.80 10*3/mm3 Final   09/17/2018 6.22 4.5 - 11.0 10*3/uL Final     RBC "   Date Value Ref Range Status   01/30/2025 4.73 3.77 - 5.28 10*6/mm3 Final   06/13/2024 4.41 3.77 - 5.28 10*6/mm3 Final   09/17/2018 4.34 4.0 - 5.2 10*6/uL Final     Hemoglobin   Date Value Ref Range Status   01/30/2025 14.4 12.0 - 15.9 g/dL Final   09/17/2018 13.2 12.0 - 16.0 g/dL Final     Hematocrit   Date Value Ref Range Status   01/30/2025 44.3 34.0 - 46.6 % Final   09/17/2018 41.3 36.0 - 46.0 % Final     Platelets   Date Value Ref Range Status   01/30/2025 273 140 - 450 10*3/mm3 Final   09/17/2018 457 (H) 140 - 440 10*3/uL Final          Assessment & Plan   *pT1c pNX Invasive Lobular Carcinoma, Left Breast, ER/OH positive  February 5, 2020, left breast lumpectomy, invasive lobular carcinoma, 20 x 14 x 10 mm, grade 2, Dousman score of 5 no lymphovascular space invasion, no evidence of DCIS, margins are all negative, rare focus of atypical lobular hyperplasia, not present at margin,  4/20/2020 - 5/18/2020: Status post adjuvant radiation  Patient  refused endocrine therapy   June 28, 2023: Patient without complaints.  Patient's last mammogram was December 12, 2022 which was negative.  Since patient's sister and grandmother had breast cancer we had discussed about consideration of genetic testing but patient had refused.  She is not on endocrine therapy and is currently followed with observation.  She is 3 years out and doing reasonably well  December 26, 2023: Currently doing very well without any new problems.  Reviewed mammogram from last week December 2023 and it is negative.  July 2, 2024: Patient had mammogram December 2023 which is negative.  She is currently followed with observation.  January 2025: Patient establish care with me.  Reviewed results of mammogram from January 2025-BI-RADS 2.  Patient would like to follow-up with annual mammograms with her primary care; and follow-up with us on an as-needed basis    *Family history of malignancy.    Her family history consists of her sister who had breast  cancer at age 46.  Her maternal grandmother had breast cancer in her 70s.  Her daughter has chronic lymphocytic leukemia.  Genetic testing could be considered to see if that information benefits the rest of the family.  Patient refused genetic testing    *History of skin cancer in the right leg (2008) and left nose (2015).    *History of Melanoma of the left leg (2017).  Patient to follow with dermatology      PLAN:  Patient is followed with observation  Reviewed mammogram from January 2025-BI-RADS 2.  Due for annual mammogram in January 2026  Patient prefers to follow-up with her primary care with annual mammograms since she is 5 years from her diagnosis.  She will see us on a as needed basis

## 2025-01-30 ENCOUNTER — LAB (OUTPATIENT)
Dept: LAB | Facility: HOSPITAL | Age: OVER 89
End: 2025-01-30
Payer: MEDICARE

## 2025-01-30 ENCOUNTER — OFFICE VISIT (OUTPATIENT)
Dept: ONCOLOGY | Facility: CLINIC | Age: OVER 89
End: 2025-01-30
Payer: MEDICARE

## 2025-01-30 VITALS
TEMPERATURE: 97.3 F | BODY MASS INDEX: 19.33 KG/M2 | WEIGHT: 120.3 LBS | DIASTOLIC BLOOD PRESSURE: 74 MMHG | SYSTOLIC BLOOD PRESSURE: 125 MMHG | RESPIRATION RATE: 16 BRPM | HEART RATE: 70 BPM | OXYGEN SATURATION: 98 % | HEIGHT: 66 IN

## 2025-01-30 DIAGNOSIS — Z17.0 MALIGNANT NEOPLASM OF BREAST IN FEMALE, ESTROGEN RECEPTOR POSITIVE, UNSPECIFIED LATERALITY, UNSPECIFIED SITE OF BREAST: ICD-10-CM

## 2025-01-30 DIAGNOSIS — Z12.31 SCREENING MAMMOGRAM FOR BREAST CANCER: ICD-10-CM

## 2025-01-30 DIAGNOSIS — C50.919 MALIGNANT NEOPLASM OF BREAST IN FEMALE, ESTROGEN RECEPTOR POSITIVE, UNSPECIFIED LATERALITY, UNSPECIFIED SITE OF BREAST: ICD-10-CM

## 2025-01-30 DIAGNOSIS — C50.919 MALIGNANT NEOPLASM OF BREAST IN FEMALE, ESTROGEN RECEPTOR POSITIVE, UNSPECIFIED LATERALITY, UNSPECIFIED SITE OF BREAST: Primary | ICD-10-CM

## 2025-01-30 DIAGNOSIS — Z17.0 MALIGNANT NEOPLASM OF BREAST IN FEMALE, ESTROGEN RECEPTOR POSITIVE, UNSPECIFIED LATERALITY, UNSPECIFIED SITE OF BREAST: Primary | ICD-10-CM

## 2025-01-30 LAB
BASOPHILS # BLD AUTO: 0.09 10*3/MM3 (ref 0–0.2)
BASOPHILS NFR BLD AUTO: 1.2 % (ref 0–1.5)
DEPRECATED RDW RBC AUTO: 45.9 FL (ref 37–54)
EOSINOPHIL # BLD AUTO: 0.14 10*3/MM3 (ref 0–0.4)
EOSINOPHIL NFR BLD AUTO: 1.9 % (ref 0.3–6.2)
ERYTHROCYTE [DISTWIDTH] IN BLOOD BY AUTOMATED COUNT: 13.3 % (ref 12.3–15.4)
HCT VFR BLD AUTO: 44.3 % (ref 34–46.6)
HGB BLD-MCNC: 14.4 G/DL (ref 12–15.9)
IMM GRANULOCYTES # BLD AUTO: 0.02 10*3/MM3 (ref 0–0.05)
IMM GRANULOCYTES NFR BLD AUTO: 0.3 % (ref 0–0.5)
LYMPHOCYTES # BLD AUTO: 1.54 10*3/MM3 (ref 0.7–3.1)
LYMPHOCYTES NFR BLD AUTO: 21.2 % (ref 19.6–45.3)
MCH RBC QN AUTO: 30.4 PG (ref 26.6–33)
MCHC RBC AUTO-ENTMCNC: 32.5 G/DL (ref 31.5–35.7)
MCV RBC AUTO: 93.7 FL (ref 79–97)
MONOCYTES # BLD AUTO: 0.5 10*3/MM3 (ref 0.1–0.9)
MONOCYTES NFR BLD AUTO: 6.9 % (ref 5–12)
NEUTROPHILS NFR BLD AUTO: 4.98 10*3/MM3 (ref 1.7–7)
NEUTROPHILS NFR BLD AUTO: 68.5 % (ref 42.7–76)
NRBC BLD AUTO-RTO: 0 /100 WBC (ref 0–0.2)
PLATELET # BLD AUTO: 273 10*3/MM3 (ref 140–450)
PMV BLD AUTO: 10 FL (ref 6–12)
RBC # BLD AUTO: 4.73 10*6/MM3 (ref 3.77–5.28)
WBC NRBC COR # BLD AUTO: 7.27 10*3/MM3 (ref 3.4–10.8)

## 2025-01-30 PROCEDURE — 36415 COLL VENOUS BLD VENIPUNCTURE: CPT

## 2025-01-30 PROCEDURE — 85025 COMPLETE CBC W/AUTO DIFF WBC: CPT

## 2025-03-17 DIAGNOSIS — E03.9 ACQUIRED HYPOTHYROIDISM: ICD-10-CM

## 2025-03-17 RX ORDER — LEVOTHYROXINE SODIUM 75 UG/1
75 TABLET ORAL DAILY
Qty: 90 TABLET | Refills: 3 | Status: SHIPPED | OUTPATIENT
Start: 2025-03-17

## 2025-03-18 ENCOUNTER — OFFICE VISIT (OUTPATIENT)
Dept: FAMILY MEDICINE CLINIC | Facility: CLINIC | Age: OVER 89
End: 2025-03-18
Payer: MEDICARE

## 2025-03-18 VITALS
RESPIRATION RATE: 19 BRPM | HEART RATE: 78 BPM | WEIGHT: 118.6 LBS | SYSTOLIC BLOOD PRESSURE: 123 MMHG | DIASTOLIC BLOOD PRESSURE: 70 MMHG | TEMPERATURE: 97.5 F | BODY MASS INDEX: 19.06 KG/M2 | HEIGHT: 66 IN | OXYGEN SATURATION: 98 %

## 2025-03-18 DIAGNOSIS — I10 ESSENTIAL HYPERTENSION: Primary | ICD-10-CM

## 2025-03-18 DIAGNOSIS — E03.9 ACQUIRED HYPOTHYROIDISM: ICD-10-CM

## 2025-03-18 RX ORDER — AMLODIPINE BESYLATE 2.5 MG/1
TABLET ORAL
Qty: 90 TABLET | Refills: 3 | Status: SHIPPED | OUTPATIENT
Start: 2025-03-18

## 2025-03-18 NOTE — PROGRESS NOTES
Chief Complaint   Patient presents with    Dysuria    Hypertension    Knee Pain     R knee    Leg Pain     Left lower       Subjective   Mela Muse is a 89 y.o. female.     Dysuria   Pertinent negatives include no nausea or vomiting.   Hypertension  Pertinent negatives include no chest pain or neck pain.   Knee Pain   Pertinent negatives include no numbness.   Leg Pain   Pertinent negatives include no numbness.      FU Htn.  Doing well with meds.    F/U Hypothyroidism.  Doing well with meds.  No Se.      The following portions of the patient's history were reviewed and updated as appropriate: allergies, current medications, past family history, past medical history, past social history, past surgical history and problem list.    Review of Systems   Constitutional:  Negative for diaphoresis and fever.   HENT:  Negative for sore throat and swollen glands.    Respiratory:  Negative for cough.    Cardiovascular:  Negative for chest pain.   Gastrointestinal:  Negative for abdominal pain, nausea and vomiting.   Musculoskeletal:  Negative for myalgias and neck pain.   Skin:  Negative for rash.   Neurological:  Negative for weakness and numbness.       Patient Active Problem List   Diagnosis    Malignant neoplasm of breast in female, estrogen receptor positive    Vertigo    History of skin cancer    History of melanoma    Osteoporosis    Post-menopausal    Dizziness    Essential hypertension    PAD (peripheral artery disease)    Claudication of both lower extremities    Asymptomatic bilateral carotid artery stenosis    Osteopenia of hip    Other fatigue    Medicare annual wellness visit, subsequent    Acquired hypothyroidism    Left-sided chest pain    COVID-19    Closed fracture of left proximal humerus    Shortness of breath    Left upper lobe pulmonary infiltrate    COPD (chronic obstructive pulmonary disease)    Dysuria    Chronic fatigue    Sequelae of protein-calorie malnutrition    Vitamin D deficiency,  "unspecified    Abdominal pain    Thrombocytosis    Immunization deficiency    Screening mammogram for breast cancer    Migraine without aura and without status migrainosus, not intractable    Acute gastroenteritis       Allergies   Allergen Reactions    Codeine Mental Status Change         Current Outpatient Medications:     amLODIPine (NORVASC) 2.5 MG tablet, TAKE ONE TABLET BY MOUTH EVERY MORNING, Disp: 90 tablet, Rfl: 3    aspirin 81 MG chewable tablet, Chew 1 tablet Daily., Disp: , Rfl:     Atogepant (Qulipta) 60 MG tablet, Take 1 tablet by mouth Daily., Disp: 30 tablet, Rfl: 0    calcium carbonate (OS-PAUL) 600 MG tablet, Take 1 tablet by mouth Daily., Disp: , Rfl:     cholecalciferol (VITAMIN D3) 25 MCG (1000 UT) tablet, Take 1 tablet by mouth Daily., Disp: , Rfl:     levothyroxine (SYNTHROID, LEVOTHROID) 75 MCG tablet, TAKE 1 TABLET BY MOUTH DAILY, Disp: 90 tablet, Rfl: 3    magnesium oxide (MAGOX) 400 (241.3 Mg) MG tablet tablet, Take 1 tablet by mouth Daily., Disp: , Rfl:     meclizine (ANTIVERT) 25 MG tablet, Take 1 tablet by mouth 3 (Three) Times a Day As Needed for Dizziness., Disp: 90 tablet, Rfl: 3    Riboflavin (VITAMIN B2 PO), Take 1 tablet by mouth Daily., Disp: , Rfl:     Past Medical History:   Diagnosis Date    Anxiety     Arthritis     Cancer 01/2020    left breast    Cancer of skin of leg, right 2021    Coronary artery disease     \"BLOCKAGE IN MY LEGS\" stents bilateral legs per pt    COVID-19 virus infection 09/13/2021    Hemorrhoids     History of basal cell cancer     History of headaches     Humerus fracture     LEFT     Hypertension     Hypothyroidism     Melanoma 2017    LEFT LEG    Osteopenia     PAD (peripheral artery disease)     Skin cancer 2015    left nose    Skin cancer 2008    right leg    Urinary tract infection 10/12/22    Treated in October    Vertigo     Vestibular migraine 2021       Past Surgical History:   Procedure Laterality Date    BREAST LUMPECTOMY WITH SENTINEL NODE " BIOPSY Left 2020    Procedure: BREAST LUMPECTOMY WITH NEEDLE LOCALIZATION;  Surgeon: Kush Plasencia MD;  Location: [facility] OR;  Service: General;  Laterality: Left;    BREAST SURGERY Left     BIOPSY    COLONOSCOPY      ALL OK    COSMETIC SURGERY      AFTER SKIN EXCISION FROM NOSE    ENDOSCOPY      EYE SURGERY Bilateral     CATARACTS    FRACTURE SURGERY  21    LEG SURGERY Left 2017    MELANOMA EXCISION-WITH SKIN GRAFT    ORIF HUMERUS FRACTURE Left 2021    Procedure: Open Reduction and Internal Fixation Proximal Humerus;  Surgeon: Sherman Panchal MD;  Location: [facility] OR;  Service: Orthopedics;  Laterality: Left;    SKIN BIOPSY  2008    SKIN CANCER EXCISION Bilateral 2015    NOSE, UPPER LIP    SKIN GRAFT Bilateral     VASCULAR SURGERY Bilateral 2018    STENTS IN BOTH UPPER LEGS       Family History   Problem Relation Age of Onset    Heart failure Father     Skin cancer Father     Heart disease Father     Breast cancer Sister 46    Heart disease Sister     Esophageal cancer Brother     Heart disease Brother     Leukemia Daughter     Breast cancer Maternal Grandmother         70s    Colon cancer Maternal Grandmother     Cancer Sister         Breast    Cancer Brother         Esophageal    Malig Hyperthermia Neg Hx        Social History     Tobacco Use    Smoking status: Former     Current packs/day: 0.00     Average packs/day: 1 pack/day for 20.0 years (20.0 ttl pk-yrs)     Types: Cigarettes     Start date: 1950     Quit date: 1970     Years since quittin.2    Smokeless tobacco: Never    Tobacco comments:     20 years. None 1970   Substance Use Topics    Alcohol use: Never            Objective     Vitals:    25 1035   BP: 123/70   Pulse: 78   Resp: 19   Temp: 97.5 °F (36.4 °C)   SpO2: 98%     Body mass index is 19.15 kg/m².    Physical Exam  Vitals reviewed.   Constitutional:       Appearance: She is well-developed. She is not diaphoretic.   HENT:      Head:  Normocephalic and atraumatic.   Eyes:      General: No scleral icterus.     Pupils: Pupils are equal, round, and reactive to light.   Neck:      Thyroid: No thyromegaly.   Cardiovascular:      Rate and Rhythm: Normal rate and regular rhythm.      Heart sounds: No murmur heard.     No friction rub. No gallop.   Pulmonary:      Effort: Pulmonary effort is normal. No respiratory distress.      Breath sounds: No wheezing or rales.   Chest:      Chest wall: No tenderness.   Abdominal:      General: Bowel sounds are normal. There is no distension.      Palpations: Abdomen is soft.      Tenderness: There is no abdominal tenderness.   Musculoskeletal:         General: No deformity. Normal range of motion.   Lymphadenopathy:      Cervical: No cervical adenopathy.   Skin:     General: Skin is warm and dry.      Findings: No rash.   Neurological:      Cranial Nerves: No cranial nerve deficit.      Motor: No abnormal muscle tone.         Lab Results   Component Value Date    GLUCOSE 118 (H) 07/02/2024    BUN 24 (H) 07/02/2024    CREATININE 1.09 (H) 07/02/2024    EGFRIFNONA 51 (L) 12/14/2021    EGFRIFAFRI 63 10/21/2021    BCR 22.0 07/02/2024    K 4.8 07/02/2024    CO2 27.3 07/02/2024    CALCIUM 9.4 07/02/2024    ALBUMIN 4.0 07/02/2024    AST 17 07/02/2024    ALT <5 07/02/2024       WBC   Date Value Ref Range Status   01/30/2025 7.27 3.40 - 10.80 10*3/mm3 Final   06/13/2024 4.67 3.40 - 10.80 10*3/mm3 Final   09/17/2018 6.22 4.5 - 11.0 10*3/uL Final     RBC   Date Value Ref Range Status   01/30/2025 4.73 3.77 - 5.28 10*6/mm3 Final   06/13/2024 4.41 3.77 - 5.28 10*6/mm3 Final   09/17/2018 4.34 4.0 - 5.2 10*6/uL Final     Hemoglobin   Date Value Ref Range Status   01/30/2025 14.4 12.0 - 15.9 g/dL Final   09/17/2018 13.2 12.0 - 16.0 g/dL Final     Hematocrit   Date Value Ref Range Status   01/30/2025 44.3 34.0 - 46.6 % Final   09/17/2018 41.3 36.0 - 46.0 % Final     MCV   Date Value Ref Range Status   01/30/2025 93.7 79.0 - 97.0 fL  Final   09/17/2018 95.2 80.0 - 100.0 fL Final     MCH   Date Value Ref Range Status   01/30/2025 30.4 26.6 - 33.0 pg Final   09/17/2018 30.4 26.0 - 34.0 pg Final     MCHC   Date Value Ref Range Status   01/30/2025 32.5 31.5 - 35.7 g/dL Final   09/17/2018 32.0 31.0 - 37.0 g/dL Final     RDW   Date Value Ref Range Status   01/30/2025 13.3 12.3 - 15.4 % Final   09/17/2018 13.7 12.0 - 16.8 % Final     RDW-SD   Date Value Ref Range Status   01/30/2025 45.9 37.0 - 54.0 fl Final     MPV   Date Value Ref Range Status   01/30/2025 10.0 6.0 - 12.0 fL Final   09/17/2018 9.6 6.7 - 10.8 fL Final     Platelets   Date Value Ref Range Status   01/30/2025 273 140 - 450 10*3/mm3 Final   09/17/2018 457 (H) 140 - 440 10*3/uL Final     Neutrophil Rel %   Date Value Ref Range Status   09/17/2018 62.9 45 - 80 % Final     Neutrophil %   Date Value Ref Range Status   01/30/2025 68.5 42.7 - 76.0 % Final     Lymphocyte Rel %   Date Value Ref Range Status   09/17/2018 23.0 15 - 50 % Final     Lymphocyte %   Date Value Ref Range Status   01/30/2025 21.2 19.6 - 45.3 % Final     Monocyte Rel %   Date Value Ref Range Status   09/17/2018 8.7 0 - 15 % Final     Monocyte %   Date Value Ref Range Status   01/30/2025 6.9 5.0 - 12.0 % Final     Eosinophil %   Date Value Ref Range Status   01/30/2025 1.9 0.3 - 6.2 % Final   09/17/2018 3.4 0 - 7 % Final     Basophil Rel %   Date Value Ref Range Status   09/17/2018 1.8 0 - 2 % Final     Basophil %   Date Value Ref Range Status   01/30/2025 1.2 0.0 - 1.5 % Final     Immature Grans %   Date Value Ref Range Status   01/30/2025 0.3 0.0 - 0.5 % Final   09/17/2018 0.2 (H) 0 % Final     Neutrophils Absolute   Date Value Ref Range Status   09/17/2018 3.92 2.0 - 8.8 10*3/uL Final     Neutrophils, Absolute   Date Value Ref Range Status   01/30/2025 4.98 1.70 - 7.00 10*3/mm3 Final     Lymphocytes Absolute   Date Value Ref Range Status   09/17/2018 1.43 0.7 - 5.5 10*3/uL Final     Lymphocytes, Absolute   Date Value  "Ref Range Status   01/30/2025 1.54 0.70 - 3.10 10*3/mm3 Final     Monocytes Absolute   Date Value Ref Range Status   09/17/2018 0.54 0.0 - 1.7 10*3/uL Final     Monocytes, Absolute   Date Value Ref Range Status   01/30/2025 0.50 0.10 - 0.90 10*3/mm3 Final     Eosinophils Absolute   Date Value Ref Range Status   09/17/2018 0.21 0.0 - 0.8 10*3/uL Final     Eosinophils, Absolute   Date Value Ref Range Status   01/30/2025 0.14 0.00 - 0.40 10*3/mm3 Final     Basophils Absolute   Date Value Ref Range Status   09/17/2018 0.11 0.0 - 0.2 10*3/uL Final     Basophils, Absolute   Date Value Ref Range Status   01/30/2025 0.09 0.00 - 0.20 10*3/mm3 Final     Immature Grans, Absolute   Date Value Ref Range Status   01/30/2025 0.02 0.00 - 0.05 10*3/mm3 Final   09/17/2018 0.01 <1 10*3/uL Final     nRBC   Date Value Ref Range Status   01/30/2025 0.0 0.0 - 0.2 /100 WBC Final       No results found for: \"HGBA1C\"    Lab Results   Component Value Date    AHNTWGYQ18 357 10/17/2023       TSH   Date Value Ref Range Status   06/13/2024 1.080 0.270 - 4.200 uIU/mL Final       No results found for: \"CHOL\"  Lab Results   Component Value Date    TRIG 94 10/17/2023     Lab Results   Component Value Date    HDL 59 10/17/2023     Lab Results   Component Value Date     (H) 10/17/2023     Lab Results   Component Value Date    VLDL 17 10/17/2023     No results found for: \"LDLHDL\"      Procedures    Assessment & Plan   Problems Addressed this Visit       Essential hypertension - Primary    Relevant Medications    amLODIPine (NORVASC) 2.5 MG tablet    Other Relevant Orders    Comprehensive Metabolic Panel    Acquired hypothyroidism    Relevant Orders    TSH     Diagnoses         Codes Comments      Essential hypertension    -  Primary ICD-10-CM: I10  ICD-9-CM: 401.9       Acquired hypothyroidism     ICD-10-CM: E03.9  ICD-9-CM: 244.9           HTN.  Controlled.  Contnue amlodipine 2.5  A day.  Rf today.  Check CMP.    Hypothyroidism.  Check TSH.  " Continue replacement.      Orders Placed This Encounter   Procedures    Comprehensive Metabolic Panel     Release to patient:   Routine Release [[id number]]    TSH     Release to patient:   Routine Release [[id number]]       Current Outpatient Medications   Medication Sig Dispense Refill    amLODIPine (NORVASC) 2.5 MG tablet TAKE ONE TABLET BY MOUTH EVERY MORNING 90 tablet 3    aspirin 81 MG chewable tablet Chew 1 tablet Daily.      Atogepant (Qulipta) 60 MG tablet Take 1 tablet by mouth Daily. 30 tablet 0    calcium carbonate (OS-PAUL) 600 MG tablet Take 1 tablet by mouth Daily.      cholecalciferol (VITAMIN D3) 25 MCG (1000 UT) tablet Take 1 tablet by mouth Daily.      levothyroxine (SYNTHROID, LEVOTHROID) 75 MCG tablet TAKE 1 TABLET BY MOUTH DAILY 90 tablet 3    magnesium oxide (MAGOX) 400 (241.3 Mg) MG tablet tablet Take 1 tablet by mouth Daily.      meclizine (ANTIVERT) 25 MG tablet Take 1 tablet by mouth 3 (Three) Times a Day As Needed for Dizziness. 90 tablet 3    Riboflavin (VITAMIN B2 PO) Take 1 tablet by mouth Daily.       No current facility-administered medications for this visit.       Mela Muse had no medications administered during this visit.    No follow-ups on file.    There are no Patient Instructions on file for this visit.

## 2025-03-19 LAB
ALBUMIN SERPL-MCNC: 4.2 G/DL (ref 3.5–5.2)
ALBUMIN/GLOB SERPL: 1.7 G/DL
ALP SERPL-CCNC: 62 U/L (ref 39–117)
ALT SERPL-CCNC: 7 U/L (ref 1–33)
AST SERPL-CCNC: 19 U/L (ref 1–32)
BILIRUB SERPL-MCNC: 0.6 MG/DL (ref 0–1.2)
BUN SERPL-MCNC: 26 MG/DL (ref 8–23)
BUN/CREAT SERPL: 26 (ref 7–25)
CALCIUM SERPL-MCNC: 9.6 MG/DL (ref 8.2–9.6)
CHLORIDE SERPL-SCNC: 106 MMOL/L (ref 98–107)
CO2 SERPL-SCNC: 27 MMOL/L (ref 22–29)
CREAT SERPL-MCNC: 1 MG/DL (ref 0.57–1)
EGFRCR SERPLBLD CKD-EPI 2021: 53 ML/MIN/1.73
GLOBULIN SER CALC-MCNC: 2.5 GM/DL
GLUCOSE SERPL-MCNC: 88 MG/DL (ref 65–99)
POTASSIUM SERPL-SCNC: 4.6 MMOL/L (ref 3.5–5.2)
PROT SERPL-MCNC: 6.7 G/DL (ref 6–8.5)
SODIUM SERPL-SCNC: 143 MMOL/L (ref 136–145)
TSH SERPL DL<=0.005 MIU/L-ACNC: 3.25 UIU/ML (ref 0.27–4.2)

## 2025-04-25 ENCOUNTER — APPOINTMENT (OUTPATIENT)
Dept: CT IMAGING | Facility: HOSPITAL | Age: OVER 89
End: 2025-04-25
Payer: MEDICARE

## 2025-04-25 ENCOUNTER — HOSPITAL ENCOUNTER (OUTPATIENT)
Facility: HOSPITAL | Age: OVER 89
Setting detail: OBSERVATION
Discharge: HOME | End: 2025-04-26
Attending: EMERGENCY MEDICINE | Admitting: EMERGENCY MEDICINE
Payer: MEDICARE

## 2025-04-25 ENCOUNTER — APPOINTMENT (OUTPATIENT)
Dept: GENERAL RADIOLOGY | Facility: HOSPITAL | Age: OVER 89
End: 2025-04-25
Payer: MEDICARE

## 2025-04-25 DIAGNOSIS — R55 SYNCOPE AND COLLAPSE: ICD-10-CM

## 2025-04-25 DIAGNOSIS — U07.1 COVID: ICD-10-CM

## 2025-04-25 DIAGNOSIS — R55 SYNCOPE, UNSPECIFIED SYNCOPE TYPE: Primary | ICD-10-CM

## 2025-04-25 DIAGNOSIS — I95.1 ORTHOSTASIS: ICD-10-CM

## 2025-04-25 LAB
ALBUMIN SERPL-MCNC: 4 G/DL (ref 3.5–5.2)
ALBUMIN/GLOB SERPL: 1.3 G/DL
ALP SERPL-CCNC: 59 U/L (ref 39–117)
ALT SERPL W P-5'-P-CCNC: 7 U/L (ref 1–33)
ANION GAP SERPL CALCULATED.3IONS-SCNC: 12 MMOL/L (ref 5–15)
AST SERPL-CCNC: 18 U/L (ref 1–32)
BASOPHILS # BLD AUTO: 0.03 10*3/MM3 (ref 0–0.2)
BASOPHILS NFR BLD AUTO: 0.8 % (ref 0–1.5)
BILIRUB SERPL-MCNC: 0.4 MG/DL (ref 0–1.2)
BUN SERPL-MCNC: 29 MG/DL (ref 8–23)
BUN/CREAT SERPL: 21.5 (ref 7–25)
CALCIUM SPEC-SCNC: 9 MG/DL (ref 8.2–9.6)
CHLORIDE SERPL-SCNC: 107 MMOL/L (ref 98–107)
CO2 SERPL-SCNC: 24 MMOL/L (ref 22–29)
CREAT SERPL-MCNC: 1.35 MG/DL (ref 0.57–1)
DEPRECATED RDW RBC AUTO: 42.8 FL (ref 37–54)
EGFRCR SERPLBLD CKD-EPI 2021: 36.9 ML/MIN/1.73
EOSINOPHIL # BLD AUTO: 0.01 10*3/MM3 (ref 0–0.4)
EOSINOPHIL NFR BLD AUTO: 0.3 % (ref 0.3–6.2)
ERYTHROCYTE [DISTWIDTH] IN BLOOD BY AUTOMATED COUNT: 12.4 % (ref 12.3–15.4)
GEN 5 1HR TROPONIN T REFLEX: 21 NG/L
GLOBULIN UR ELPH-MCNC: 3.1 GM/DL
GLUCOSE SERPL-MCNC: 102 MG/DL (ref 65–99)
HCT VFR BLD AUTO: 40.3 % (ref 34–46.6)
HGB BLD-MCNC: 13.5 G/DL (ref 12–15.9)
HOLD SPECIMEN: NORMAL
HOLD SPECIMEN: NORMAL
IMM GRANULOCYTES # BLD AUTO: 0 10*3/MM3 (ref 0–0.05)
IMM GRANULOCYTES NFR BLD AUTO: 0 % (ref 0–0.5)
LYMPHOCYTES # BLD AUTO: 0.63 10*3/MM3 (ref 0.7–3.1)
LYMPHOCYTES NFR BLD AUTO: 16.9 % (ref 19.6–45.3)
MAGNESIUM SERPL-MCNC: 2.4 MG/DL (ref 1.7–2.3)
MCH RBC QN AUTO: 31.2 PG (ref 26.6–33)
MCHC RBC AUTO-ENTMCNC: 33.5 G/DL (ref 31.5–35.7)
MCV RBC AUTO: 93.1 FL (ref 79–97)
MONOCYTES # BLD AUTO: 0.67 10*3/MM3 (ref 0.1–0.9)
MONOCYTES NFR BLD AUTO: 18 % (ref 5–12)
NEUTROPHILS NFR BLD AUTO: 2.39 10*3/MM3 (ref 1.7–7)
NEUTROPHILS NFR BLD AUTO: 64 % (ref 42.7–76)
NRBC BLD AUTO-RTO: 0 /100 WBC (ref 0–0.2)
PLATELET # BLD AUTO: 211 10*3/MM3 (ref 140–450)
PMV BLD AUTO: 9.4 FL (ref 6–12)
POTASSIUM SERPL-SCNC: 4.5 MMOL/L (ref 3.5–5.2)
PROT SERPL-MCNC: 7.1 G/DL (ref 6–8.5)
QT INTERVAL: 416 MS
QTC INTERVAL: 441 MS
RBC # BLD AUTO: 4.33 10*6/MM3 (ref 3.77–5.28)
SODIUM SERPL-SCNC: 143 MMOL/L (ref 136–145)
TROPONIN T % DELTA: -5
TROPONIN T NUMERIC DELTA: -1 NG/L
TROPONIN T SERPL HS-MCNC: 22 NG/L
WBC NRBC COR # BLD AUTO: 3.73 10*3/MM3 (ref 3.4–10.8)
WHOLE BLOOD HOLD COAG: NORMAL
WHOLE BLOOD HOLD SPECIMEN: NORMAL

## 2025-04-25 PROCEDURE — 25810000003 SODIUM CHLORIDE 0.9 % SOLUTION: Performed by: EMERGENCY MEDICINE

## 2025-04-25 PROCEDURE — 93010 ELECTROCARDIOGRAM REPORT: CPT | Performed by: INTERNAL MEDICINE

## 2025-04-25 PROCEDURE — 36415 COLL VENOUS BLD VENIPUNCTURE: CPT | Performed by: EMERGENCY MEDICINE

## 2025-04-25 PROCEDURE — 99285 EMERGENCY DEPT VISIT HI MDM: CPT

## 2025-04-25 PROCEDURE — 72125 CT NECK SPINE W/O DYE: CPT

## 2025-04-25 PROCEDURE — 71045 X-RAY EXAM CHEST 1 VIEW: CPT

## 2025-04-25 PROCEDURE — 80053 COMPREHEN METABOLIC PANEL: CPT | Performed by: EMERGENCY MEDICINE

## 2025-04-25 PROCEDURE — 84484 ASSAY OF TROPONIN QUANT: CPT | Performed by: EMERGENCY MEDICINE

## 2025-04-25 PROCEDURE — G0378 HOSPITAL OBSERVATION PER HR: HCPCS

## 2025-04-25 PROCEDURE — 93005 ELECTROCARDIOGRAM TRACING: CPT | Performed by: EMERGENCY MEDICINE

## 2025-04-25 PROCEDURE — 70450 CT HEAD/BRAIN W/O DYE: CPT

## 2025-04-25 PROCEDURE — 25810000003 SODIUM CHLORIDE 0.9 % SOLUTION: Performed by: NURSE PRACTITIONER

## 2025-04-25 PROCEDURE — 85025 COMPLETE CBC W/AUTO DIFF WBC: CPT | Performed by: EMERGENCY MEDICINE

## 2025-04-25 PROCEDURE — 83735 ASSAY OF MAGNESIUM: CPT | Performed by: EMERGENCY MEDICINE

## 2025-04-25 RX ORDER — BISACODYL 10 MG
10 SUPPOSITORY, RECTAL RECTAL DAILY PRN
Status: DISCONTINUED | OUTPATIENT
Start: 2025-04-25 | End: 2025-04-26 | Stop reason: HOSPADM

## 2025-04-25 RX ORDER — SODIUM CHLORIDE 9 MG/ML
75 INJECTION, SOLUTION INTRAVENOUS CONTINUOUS
Status: DISCONTINUED | OUTPATIENT
Start: 2025-04-25 | End: 2025-04-26 | Stop reason: HOSPADM

## 2025-04-25 RX ORDER — POLYETHYLENE GLYCOL 3350 17 G/17G
17 POWDER, FOR SOLUTION ORAL DAILY PRN
Status: DISCONTINUED | OUTPATIENT
Start: 2025-04-25 | End: 2025-04-26 | Stop reason: HOSPADM

## 2025-04-25 RX ORDER — ASPIRIN 81 MG/1
81 TABLET, CHEWABLE ORAL DAILY
Status: DISCONTINUED | OUTPATIENT
Start: 2025-04-26 | End: 2025-04-26 | Stop reason: HOSPADM

## 2025-04-25 RX ORDER — AMLODIPINE BESYLATE 5 MG/1
2.5 TABLET ORAL
Status: DISCONTINUED | OUTPATIENT
Start: 2025-04-26 | End: 2025-04-26 | Stop reason: HOSPADM

## 2025-04-25 RX ORDER — SODIUM CHLORIDE 0.9 % (FLUSH) 0.9 %
10 SYRINGE (ML) INJECTION AS NEEDED
Status: DISCONTINUED | OUTPATIENT
Start: 2025-04-25 | End: 2025-04-26 | Stop reason: HOSPADM

## 2025-04-25 RX ORDER — AMOXICILLIN 250 MG
2 CAPSULE ORAL 2 TIMES DAILY PRN
Status: DISCONTINUED | OUTPATIENT
Start: 2025-04-25 | End: 2025-04-26 | Stop reason: HOSPADM

## 2025-04-25 RX ORDER — SODIUM CHLORIDE 9 MG/ML
40 INJECTION, SOLUTION INTRAVENOUS AS NEEDED
Status: DISCONTINUED | OUTPATIENT
Start: 2025-04-25 | End: 2025-04-26 | Stop reason: HOSPADM

## 2025-04-25 RX ORDER — NITROGLYCERIN 0.4 MG/1
0.4 TABLET SUBLINGUAL
Status: DISCONTINUED | OUTPATIENT
Start: 2025-04-25 | End: 2025-04-26 | Stop reason: HOSPADM

## 2025-04-25 RX ORDER — SODIUM CHLORIDE 0.9 % (FLUSH) 0.9 %
10 SYRINGE (ML) INJECTION EVERY 12 HOURS SCHEDULED
Status: DISCONTINUED | OUTPATIENT
Start: 2025-04-25 | End: 2025-04-26 | Stop reason: HOSPADM

## 2025-04-25 RX ORDER — BISACODYL 5 MG/1
5 TABLET, DELAYED RELEASE ORAL DAILY PRN
Status: DISCONTINUED | OUTPATIENT
Start: 2025-04-25 | End: 2025-04-26 | Stop reason: HOSPADM

## 2025-04-25 RX ADMIN — MAGNESIUM OXIDE TAB 400 MG (240 MG ELEMENTAL MG) 400 MG: 400 (240 MG) TAB at 20:05

## 2025-04-25 RX ADMIN — SODIUM CHLORIDE 500 ML: 9 INJECTION, SOLUTION INTRAVENOUS at 15:06

## 2025-04-25 RX ADMIN — SODIUM CHLORIDE 75 ML/HR: 9 INJECTION, SOLUTION INTRAVENOUS at 16:53

## 2025-04-25 RX ADMIN — Medication 10 ML: at 20:06

## 2025-04-25 NOTE — ED PROVIDER NOTES
EMERGENCY DEPARTMENT ENCOUNTER  Room Number:  29/29  PCP: Yuniel Bonilla MD  Independent Historians: Patient and Family      HPI:  Chief Complaint: had concerns including Weakness - Generalized, Fall, Head Injury, Nausea, Vomiting, and Diarrhea.     A complete HPI/ROS/PMH/PSH/SH/FH are unobtainable due to: None    Chronic or social conditions impacting patient care (Social Determinants of Health): None      Context: Mela Muse is a 89 y.o. female with a medical history of thrombocytosis, dizziness, vertigo, peripheral artery disease who presents to the ED c/o acute syncopal episode and generalized weakness.  The patient reports yesterday she had a syncopal episode.  She states she had vomiting and diarrhea all day.  She then felt like she was lightheaded so she started moving towards the ground.  She states she woke up on the ground with pain to her left eyebrow.  It sounds like she had a syncopal episode.  She denies any chest pain.  She reports chronic shortness of breath from the last time she had COVID.  She went to the urgent care center today and she was initially hypotensive.  She tested positive for COVID.  She was sent here for further evaluation.      Review of prior external notes (non-ED) -and- Review of prior external test results outside of this encounter:  COVID swab was positive earlier today    Prescription drug monitoring program review:         PAST MEDICAL HISTORY  Active Ambulatory Problems     Diagnosis Date Noted    Malignant neoplasm of breast in female, estrogen receptor positive 01/28/2020    Vertigo 02/26/2020    History of skin cancer 02/26/2020    History of melanoma 02/26/2020    Osteoporosis 02/26/2020    Post-menopausal 02/26/2020    Dizziness 02/27/2020    Essential hypertension 02/27/2020    PAD (peripheral artery disease) 08/10/2018    Claudication of both lower extremities 08/10/2018    Asymptomatic bilateral carotid artery stenosis 12/10/2019    Osteopenia of hip  05/12/2020    Other fatigue 08/04/2020    Medicare annual wellness visit, subsequent 04/08/2021    Acquired hypothyroidism 04/08/2021    Left-sided chest pain 04/08/2021    COVID-19 09/14/2021    Closed fracture of left proximal humerus 09/17/2021    Shortness of breath 10/21/2021    Left upper lobe pulmonary infiltrate 12/07/2021    COPD (chronic obstructive pulmonary disease) 03/10/2022    Dysuria 10/12/2022    Chronic fatigue 10/12/2022    Sequelae of protein-calorie malnutrition 10/12/2022    Vitamin D deficiency, unspecified 10/12/2022    Abdominal pain 10/12/2022    Thrombocytosis 11/22/2022    Immunization deficiency 11/22/2022    Screening mammogram for breast cancer 10/17/2023    Migraine without aura and without status migrainosus, not intractable 06/13/2024    Acute gastroenteritis 12/10/2024     Resolved Ambulatory Problems     Diagnosis Date Noted    No Resolved Ambulatory Problems     Past Medical History:   Diagnosis Date    Anxiety     Arthritis     Cancer 01/2020    Cancer of skin of leg, right 2021    Coronary artery disease     COVID-19 virus infection 09/13/2021    Hemorrhoids     History of basal cell cancer     History of headaches     HL (hearing loss)     Humerus fracture     Hypertension     Hypothyroidism     Melanoma 2017    Osteopenia     Skin cancer 2015    Skin cancer 2008    Urinary tract infection 10/12/22    Vestibular migraine 2021         PAST SURGICAL HISTORY  Past Surgical History:   Procedure Laterality Date    BREAST LUMPECTOMY WITH SENTINEL NODE BIOPSY Left 02/05/2020    Procedure: BREAST LUMPECTOMY WITH NEEDLE LOCALIZATION;  Surgeon: Kush Plasencia MD;  [facility];  Service: General;  Laterality: Left;    BREAST SURGERY Left 2020    BIOPSY    COLONOSCOPY      ALL OK    COSMETIC SURGERY      AFTER SKIN EXCISION FROM NOSE    ENDOSCOPY      EYE SURGERY Bilateral     CATARACTS    FRACTURE SURGERY  09/23/2021    Left arm    LEG SURGERY Left 2017    MELANOMA  EXCISION-WITH SKIN GRAFT    ORIF HUMERUS FRACTURE Left 2021    Procedure: Open Reduction and Internal Fixation Proximal Humerus;  Surgeon: Sherman Panchal MD;  Location: [facility] OR;  Service: Orthopedics;  Laterality: Left;    SKIN BIOPSY  2008    SKIN CANCER EXCISION Bilateral 2015    NOSE, UPPER LIP    SKIN GRAFT Bilateral     VASCULAR SURGERY Bilateral 2018    STENTS IN BOTH UPPER LEGS         FAMILY HISTORY  Family History   Problem Relation Age of Onset    Heart failure Father     Skin cancer Father     Heart disease Father     Breast cancer Sister 46    Heart disease Sister     Esophageal cancer Brother     Heart disease Brother     Leukemia Daughter     Breast cancer Maternal Grandmother         70s    Colon cancer Maternal Grandmother     Cancer Sister         Breast    Cancer Brother         Esophageal    Malig Hyperthermia Neg Hx          SOCIAL HISTORY  Social History     Socioeconomic History    Marital status:      Spouse name: Vladimir    Number of children: 4   Tobacco Use    Smoking status: Former     Current packs/day: 0.00     Average packs/day: 1 pack/day for 20.0 years (20.0 ttl pk-yrs)     Types: Cigarettes     Start date: 1950     Quit date: 1970     Years since quittin.3    Smokeless tobacco: Never    Tobacco comments:     20 years. None    Vaping Use    Vaping status: Never Used   Substance and Sexual Activity    Alcohol use: Never    Drug use: Never    Sexual activity: Not Currently     Partners: Male         ALLERGIES  Codeine      REVIEW OF SYSTEMS  Review of Systems  Included in HPI  All systems reviewed and negative except for those discussed in HPI.      PHYSICAL EXAM    I have reviewed the triage vital signs and nursing notes.    ED Triage Vitals [25 1217]   Temp Heart Rate Resp BP SpO2   97.2 °F (36.2 °C) 68 20 114/77 98 %      Temp src Heart Rate Source Patient Position BP Location FiO2 (%)   -- -- Sitting Right arm --       Physical  Exam  GENERAL: Awake, alert, no acute distress  SKIN: Warm, dry  HENT: Normocephalic, mild tenderness of the left eyebrow  EYES: no scleral icterus  CV: regular rhythm, regular rate  RESPIRATORY: normal effort, lungs clear  ABDOMEN: soft, nontender, nondistended  MUSCULOSKELETAL: no deformity.  Mild midline cervical tenderness diffusely.  NEURO: alert, moves all extremities, follows commands            LAB RESULTS  Recent Results (from the past 24 hours)   Covid-19 + Flu A&B AG, Veritor    Collection Time: 04/25/25 11:52 AM    Specimen: Swab   Result Value Ref Range    COVID19 Detected (A)     Influenza A Antigen NOMAN Not Detected     Influenza B Antigen NOMAN Not Detected     Internal Control Passed     Lot Number [id number]     Expiration Date [id number]    Comprehensive Metabolic Panel    Collection Time: 04/25/25 12:31 PM    Specimen: Arm, Right; Blood   Result Value Ref Range    Glucose 102 (H) 65 - 99 mg/dL    BUN 29 (H) 8 - 23 mg/dL    Creatinine 1.35 (H) 0.57 - 1.00 mg/dL    Sodium 143 136 - 145 mmol/L    Potassium 4.5 3.5 - 5.2 mmol/L    Chloride 107 98 - 107 mmol/L    CO2 24.0 22.0 - 29.0 mmol/L    Calcium 9.0 8.2 - 9.6 mg/dL    Total Protein 7.1 6.0 - 8.5 g/dL    Albumin 4.0 3.5 - 5.2 g/dL    ALT (SGPT) 7 1 - 33 U/L    AST (SGOT) 18 1 - 32 U/L    Alkaline Phosphatase 59 39 - 117 U/L    Total Bilirubin 0.4 0.0 - 1.2 mg/dL    Globulin 3.1 gm/dL    A/G Ratio 1.3 g/dL    BUN/Creatinine Ratio 21.5 7.0 - 25.0    Anion Gap 12.0 5.0 - 15.0 mmol/L    eGFR 36.9 (L) >60.0 mL/min/1.73   High Sensitivity Troponin T    Collection Time: 04/25/25 12:31 PM    Specimen: Arm, Right; Blood   Result Value Ref Range    HS Troponin T 22 (H) <14 ng/L   Magnesium    Collection Time: 04/25/25 12:31 PM    Specimen: Arm, Right; Blood   Result Value Ref Range    Magnesium 2.4 (H) 1.7 - 2.3 mg/dL   Green Top (Gel)    Collection Time: 04/25/25 12:31 PM   Result Value Ref Range    Extra Tube Hold for add-ons.    Lavender Top    Collection  Time: 04/25/25 12:31 PM   Result Value Ref Range    Extra Tube hold for add-on    Gold Top - SST    Collection Time: 04/25/25 12:31 PM   Result Value Ref Range    Extra Tube Hold for add-ons.    Light Blue Top    Collection Time: 04/25/25 12:31 PM   Result Value Ref Range    Extra Tube Hold for add-ons.    CBC Auto Differential    Collection Time: 04/25/25 12:31 PM    Specimen: Arm, Right; Blood   Result Value Ref Range    WBC 3.73 3.40 - 10.80 10*3/mm3    RBC 4.33 3.77 - 5.28 10*6/mm3    Hemoglobin 13.5 12.0 - 15.9 g/dL    Hematocrit 40.3 34.0 - 46.6 %    MCV 93.1 79.0 - 97.0 fL    MCH 31.2 26.6 - 33.0 pg    MCHC 33.5 31.5 - 35.7 g/dL    RDW 12.4 12.3 - 15.4 %    RDW-SD 42.8 37.0 - 54.0 fl    MPV 9.4 6.0 - 12.0 fL    Platelets 211 140 - 450 10*3/mm3    Neutrophil % 64.0 42.7 - 76.0 %    Lymphocyte % 16.9 (L) 19.6 - 45.3 %    Monocyte % 18.0 (H) 5.0 - 12.0 %    Eosinophil % 0.3 0.3 - 6.2 %    Basophil % 0.8 0.0 - 1.5 %    Immature Grans % 0.0 0.0 - 0.5 %    Neutrophils, Absolute 2.39 1.70 - 7.00 10*3/mm3    Lymphocytes, Absolute 0.63 (L) 0.70 - 3.10 10*3/mm3    Monocytes, Absolute 0.67 0.10 - 0.90 10*3/mm3    Eosinophils, Absolute 0.01 0.00 - 0.40 10*3/mm3    Basophils, Absolute 0.03 0.00 - 0.20 10*3/mm3    Immature Grans, Absolute 0.00 0.00 - 0.05 10*3/mm3    nRBC 0.0 0.0 - 0.2 /100 WBC   ECG 12 Lead Other; weakness    Collection Time: 04/25/25 12:32 PM   Result Value Ref Range    QT Interval 416 ms    QTC Interval 441 ms   High Sensitivity Troponin T 1Hr    Collection Time: 04/25/25  1:35 PM    Specimen: Arm, Right; Blood   Result Value Ref Range    HS Troponin T 21 (H) <14 ng/L    Troponin T Numeric Delta -1 ng/L    Troponin T % Delta -5 Abnormal if >/= 20%         RADIOLOGY  CT Head Without Contrast  CT Head Without Contrast, CT Cervical Spine Without Contrast  Result Date: 4/25/2025  CT HEAD WO CONTRAST-, CT CERVICAL SPINE WO CONTRAST-  HISTORY:  fall/ head injury  COMPARISON: CT head 3/17/2020  CT HEAD WITHOUT  CONTRAST: There is age-appropriate atrophy. Moderate vascular calcification is noted. There is no evidence of hemorrhage, hydrocephalus, fracture or of a focal area of decreased attenuation to suggest acute infarction/contusion. A small scalp hematoma in the supraorbital region on the left is noted.   CT CERVICAL SPINE WITHOUT CONTRAST: The alignment of the cervical spine is within normal limits. There is no evidence of prevertebral edema or fracture. Anterior bridging osteophyte is noted from T2 to at least T5. Mild facet degenerative disease is present to the left from C4-C6. There is a spiculated nodule involving the right upper lobe posterior laterally measuring approximately 8 mm in size. This appears similar to the CT examination of the chest performed on 12/3/2021. Also similar is a more central area of nodularity measuring approximately 9 mm in maximum transverse dimension.   Radiation dose reduction techniques were utilized, including automated exposure modulation based on body size.  This report was finalized on 4/25/2025 2:19 PM by Dr. Khoa Gongora M.D on Workstation: [id number]      XR Chest 1 View  Result Date: 4/25/2025  XR CHEST 1 VW-4/25/2025  HISTORY: Weakness, dizziness. COVID.  Heart size is mildly enlarged. Lungs are well expanded and appear relatively clear. Proximal left humerus compression plate is seen. There is nominal thoracic scoliosis.      1. Mild cardiomegaly. 2. No acute infiltrates.   This report was finalized on 4/25/2025 2:02 PM by Dr. Lyle Artis M.D on Workstation: [id number]          MEDICATIONS GIVEN IN ER  Medications   sodium chloride 0.9 % flush 10 mL (has no administration in time range)   sodium chloride 0.9 % bolus 500 mL (500 mL Intravenous New Bag 4/25/25 1506)         ORDERS PLACED DURING THIS VISIT:  Orders Placed This Encounter   Procedures    XR Chest 1 View    CT Head Without Contrast    CT Cervical Spine Without Contrast    Ariel Draw    Comprehensive  Metabolic Panel    High Sensitivity Troponin T    Magnesium    Urinalysis With Microscopic If Indicated (No Culture) - Urine, Clean Catch    CBC Auto Differential    High Sensitivity Troponin T 1Hr    NPO Diet NPO Type: Strict NPO    Undress & Gown    Continuous Pulse Oximetry    Vital Signs    Orthostatic Blood Pressure    Oxygen Therapy- Nasal Cannula; Titrate 1-6 LPM Per SpO2; 90 - 95%    POC Glucose Once    ECG 12 Lead Other; weakness    Insert Peripheral IV    Fall Precautions    CBC & Differential    Green Top (Gel)    Lavender Top    Gold Top - SST    Light Blue Top         OUTPATIENT MEDICATION MANAGEMENT:  Current Facility-Administered Medications Ordered in Epic   Medication Dose Route Frequency Provider Last Rate Last Admin    sodium chloride 0.9 % bolus 500 mL  500 mL Intravenous Once Donta Ng MD 1,000 mL/hr at 04/25/25 1506 500 mL at 04/25/25 1506    sodium chloride 0.9 % flush 10 mL  10 mL Intravenous PRN Donta Ng MD         Current Outpatient Medications Ordered in Epic   Medication Sig Dispense Refill    amLODIPine (NORVASC) 2.5 MG tablet TAKE ONE TABLET BY MOUTH EVERY MORNING 90 tablet 3    aspirin 81 MG chewable tablet Chew 1 tablet Daily.      Atogepant (Qulipta) 60 MG tablet Take 1 tablet by mouth Daily. 30 tablet 0    calcium carbonate (OS-PAUL) 600 MG tablet Take 1 tablet by mouth Daily.      cholecalciferol (VITAMIN D3) 25 MCG (1000 UT) tablet Take 1 tablet by mouth Daily.      levothyroxine (SYNTHROID, LEVOTHROID) 75 MCG tablet TAKE 1 TABLET BY MOUTH DAILY 90 tablet 3    magnesium oxide (MAGOX) 400 (241.3 Mg) MG tablet tablet Take 1 tablet by mouth Daily.      meclizine (ANTIVERT) 25 MG tablet Take 1 tablet by mouth 3 (Three) Times a Day As Needed for Dizziness. 90 tablet 3    Riboflavin (VITAMIN B2 PO) Take 1 tablet by mouth Daily.           PROCEDURES  Procedures            PROGRESS, DATA ANALYSIS, CONSULTS, AND MEDICAL DECISION MAKING  All labs have been independently  interpreted by me.  All radiology studies have been reviewed by me. All EKG's have been independently viewed and interpreted by me.  Discussion below represents my analysis of pertinent findings related to patient's condition, differential diagnosis, treatment plan and final disposition.    Differential diagnosis includes but is not limited to dehydration, acute coronary syndrome, acute aortic syndrome, PE, pneumothorax, orthostasis, pneumonia, intracranial hemorrhage.    Clinical Scores:                                       ED Course as of 04/25/25 1509   Fri Apr 25, 2025   1341 EKG PROCEDURE    EKG time: 1532  Rhythm/Rate: Normal sinus, rate 68  P waves and NJ: Normal P, normal NJ  QRS, axis: Narrow QRS, normal axis  ST and T waves: No acute    Independently Interpreted by me  Not significantly changed compared to prior 9/23/2021   [TR]   1343 CT Head Without Contrast  My independent interpretation of the imaging study is no acute hemorrhage [TR]   1344 Creatinine(!): 1.35 [TR]   1345 Hemoglobin: 13.5 [TR]   1345 Magnesium(!): 2.4 [TR]   1345 HS Troponin T(!): 22 [TR]   1414 Troponin T Numeric Delta: -1 [TR]   1455 The statics were markedly positive and the patient was unable to complete.  Adding IV fluids. [TR]   1504 Reviewed the workup and findings with the patient and family at the bedside.  Answered all questions.  She is positive for COVID, orthostatic, has had fluid losses.  Plan admission for further hydration and syncope evaluation.  They are agreeable. [TR]   1507 With with Jacqueline  with the observation unit.  She agrees to admit. [TR]      ED Course User Index  [TR] Donta Ng MD             AS OF 15:09 EDT VITALS:    BP - 99/71  HR - 76  TEMP - 97.2 °F (36.2 °C)  O2 SATS - 96%    COMPLEXITY OF CARE  The patient requires admission.      DIAGNOSIS  Final diagnoses:   Syncope, unspecified syncope type   COVID   Orthostasis         DISPOSITION  ED Disposition       ED Disposition   Decision to Admit     Condition   --    Comment   --                Please note that portions of this document were completed with a voice recognition program.    Note Disclaimer: At [facility], we believe that sharing information builds trust and better relationships. You are receiving this note because you recently visited [facility]. It is possible you will see health information before a provider has talked with you about it. This kind of information can be easy to misunderstand. To help you fully understand what it means for your health, we urge you to discuss this note with your provider.         Donta Ng MD  04/25/25 [id number]

## 2025-04-25 NOTE — NURSING NOTE
"Nursing report ED to floor  Mela Muse  89 y.o.  female    HPI :  HPI  Stated Reason for Visit: weakness/ fall/ head injury.    Chief Complaint  Chief Complaint   Patient presents with    Weakness - Generalized    Fall    Head Injury    Nausea    Vomiting    Diarrhea       Admitting doctor:   Tee Cortez MD    Admitting diagnosis:   The primary encounter diagnosis was Syncope, unspecified syncope type. Diagnoses of COVID and Orthostasis were also pertinent to this visit.    Code status:   Current Code Status       Date Active Code Status Order ID Comments User Context       4/25/2025 1512 CPR (Attempt to Resuscitate) [id number]  Jacqueline Hughes APRN ED        Question Answer    Code Status (Patient has no pulse and is not breathing) CPR (Attempt to Resuscitate)    Medical Interventions (Patient has pulse or is breathing) Full Support    Level Of Support Discussed With Patient                    Allergies:   Codeine    Isolation:   Enhanced Droplet/Contact     Intake and Output  No intake or output data in the 24 hours ending 04/25/25 1529    Weight:       04/25/25  1416   Weight: 54.4 kg (120 lb)       Most recent vitals:   Vitals:    04/25/25 1217 04/25/25 1416 04/25/25 1446 04/25/25 1448   BP: 114/77 131/71 126/61 99/71   BP Location: Right arm      Patient Position: Sitting  Lying Standing   Pulse: 68 58 55 76   Resp: 20 16     Temp: 97.2 °F (36.2 °C)      SpO2: 98% 96%     Weight:  54.4 kg (120 lb)     Height:  167.6 cm (66\")         Active LDAs/IV Access:   Lines, Drains & Airways       Active LDAs       Name Placement date Placement time Site Days    Peripheral IV 04/25/25 1506 20 G Left Antecubital 04/25/25  1506  Antecubital  less than 1                    Labs (abnormal labs have a star):   Labs Reviewed   COMPREHENSIVE METABOLIC PANEL - Abnormal; Notable for the following components:       Result Value    Glucose 102 (*)     BUN 29 (*)     Creatinine 1.35 (*)     eGFR 36.9 (*)     All other " components within normal limits    Narrative:     GFR Categories in Chronic Kidney Disease (CKD)      GFR Category          GFR (mL/min/1.73)    Interpretation  G1                     90 or greater         Normal or high (1)  G2                      60-89                Mild decrease (1)  G3a                   45-59                Mild to moderate decrease  G3b                   30-44                Moderate to severe decrease  G4                    15-29                Severe decrease  G5                    14 or less           Kidney failure          (1)In the absence of evidence of kidney disease, neither GFR category G1 or G2 fulfill the criteria for CKD.    eGFR calculation 2021 CKD-EPI creatinine equation, which does not include race as a factor   TROPONIN - Abnormal; Notable for the following components:    HS Troponin T 22 (*)     All other components within normal limits    Narrative:     High Sensitive Troponin T Reference Range:  <14.0 ng/L- Negative Female for AMI  <22.0 ng/L- Negative Male for AMI  >=14 - Abnormal Female indicating possible myocardial injury.  >=22 - Abnormal Male indicating possible myocardial injury.   Clinicians would have to utilize clinical acumen, EKG, Troponin, and serial changes to determine if it is an Acute Myocardial Infarction or myocardial injury due to an underlying chronic condition.        MAGNESIUM - Abnormal; Notable for the following components:    Magnesium 2.4 (*)     All other components within normal limits   CBC WITH AUTO DIFFERENTIAL - Abnormal; Notable for the following components:    Lymphocyte % 16.9 (*)     Monocyte % 18.0 (*)     Lymphocytes, Absolute 0.63 (*)     All other components within normal limits   HIGH SENSITIVITIY TROPONIN T 1HR - Abnormal; Notable for the following components:    HS Troponin T 21 (*)     All other components within normal limits    Narrative:     High Sensitive Troponin T Reference Range:  <14.0 ng/L- Negative Female for  AMI  <22.0 ng/L- Negative Male for AMI  >=14 - Abnormal Female indicating possible myocardial injury.  >=22 - Abnormal Male indicating possible myocardial injury.   Clinicians would have to utilize clinical acumen, EKG, Troponin, and serial changes to determine if it is an Acute Myocardial Infarction or myocardial injury due to an underlying chronic condition.        RAINBOW DRAW    Narrative:     The following orders were created for panel order [place] Draw.  Procedure                               Abnormality         Status                     ---------                               -----------         ------                     Green Top (Gel)[[id number]]                                  Final result               Lavender Top[[id number]]                                     Final result               Gold Top - SST[[id number]]                                   Final result               Light Blue Top[[id number]]                                   Final result                 Please view results for these tests on the individual orders.   URINALYSIS W/ MICROSCOPIC IF INDICATED (NO CULTURE)   POCT GLUCOSE FINGERSTICK   CBC AND DIFFERENTIAL    Narrative:     The following orders were created for panel order CBC & Differential.  Procedure                               Abnormality         Status                     ---------                               -----------         ------                     CBC Auto Differential[[id number]]        Abnormal            Final result                 Please view results for these tests on the individual orders.   GREEN TOP   LAVENDER TOP   GOLD TOP - SST   LIGHT BLUE TOP       EKG:   ECG 12 Lead Other; weakness   Final Result   HEART RATE=68  bpm   RR Lpuipuvb=980  ms   WI Aiinrigq=195  ms   P Horizontal Axis=42  deg   P Front Axis=48  deg   QRSD Interval=96  ms   QT Voeymghs=218  ms   LXvV=173  ms   QRS Axis=Invalid  deg   T Wave Axis=25  deg   - OTHERWISE NORMAL ECG -   Sinus  rhythm   No change from prior tracing   Electronically Signed By: Venessa Gilliland ([facility]) 2025-04-25 14:11:50   Date and Time of Study:2025-04-25 12:32:04          Meds given in ED:   Medications   sodium chloride 0.9 % flush 10 mL (has no administration in time range)   sodium chloride 0.9 % bolus 500 mL (500 mL Intravenous New Bag 4/25/25 1506)   sodium chloride 0.9 % flush 10 mL (has no administration in time range)   sodium chloride 0.9 % flush 10 mL (has no administration in time range)   sodium chloride 0.9 % infusion 40 mL (has no administration in time range)   nitroglycerin (NITROSTAT) SL tablet 0.4 mg (has no administration in time range)   Potassium Replacement - Follow Nurse / BPA Driven Protocol (has no administration in time range)   Magnesium Standard Dose Replacement - Follow Nurse / BPA Driven Protocol (has no administration in time range)   Phosphorus Replacement - Follow Nurse / BPA Driven Protocol (has no administration in time range)   Calcium Replacement - Follow Nurse / BPA Driven Protocol (has no administration in time range)   sennosides-docusate (PERICOLACE) 8.6-50 MG per tablet 2 tablet (has no administration in time range)     And   polyethylene glycol (MIRALAX) packet 17 g (has no administration in time range)     And   bisacodyl (DULCOLAX) EC tablet 5 mg (has no administration in time range)     And   bisacodyl (DULCOLAX) suppository 10 mg (has no administration in time range)   sodium chloride 0.9 % infusion (has no administration in time range)       Imaging results:  XR Chest 1 View  Result Date: 4/25/2025  1. Mild cardiomegaly. 2. No acute infiltrates.   This report was finalized on 4/25/2025 2:02 PM by Dr. Lyle Artis M.D on Workstation: [id number]        Ambulatory status:   - br    Social issues:   Social History     Socioeconomic History    Marital status:      Spouse name: Vladimir    Number of children: 4   Tobacco Use    Smoking status: Former     Current packs/day:  0.00     Average packs/day: 1 pack/day for 20.0 years (20.0 ttl pk-yrs)     Types: Cigarettes     Start date: 1950     Quit date: 1970     Years since quittin.3    Smokeless tobacco: Never    Tobacco comments:     20 years. None    Vaping Use    Vaping status: Never Used   Substance and Sexual Activity    Alcohol use: Never    Drug use: Never    Sexual activity: Not Currently     Partners: Male       Peripheral Neurovascular  Peripheral Neurovascular (Adult)  Peripheral Neurovascular WDL: WDL    Neuro Cognitive  Neuro Cognitive (Adult)  Cognitive/Neuro/Behavioral WDL: WDL    Learning  Learning Assessment  Learning Readiness and Ability: no barriers identified    Respiratory  Respiratory WDL  Respiratory WDL: WDL    Abdominal Pain       Pain Assessments  Pain (Adult)  (0-10) Pain Rating: Rest: 5  (0-10) Pain Rating: Activity: 5  Pain Location: head    NIH Stroke Scale       Francisco Agee RN  25 15:29 EDT

## 2025-04-25 NOTE — PROGRESS NOTES
Patient Care Team:  Yuniel Bonilla MD as PCP - General  Yuniel Bonilla MD as PCP - Family Medicine  Self, Khoa Quick MD (Vascular Surgery)  Sima Joiner MD as Consulting Physician (Radiation Oncology)  Kush Plasencia MD as Referring Physician (Breast Surgery)  Yesi Ochoa MD as Consulting Physician (Hematology and Oncology)     BETTY KUNZ H&P Note    I supervised care provided by the midlevel provider. We have discussed this patient's history, physical exam, and treatment plan. I have reviewed the midlevel provider's note and I agree with the midlevel provider's findings and plan of care.   SHARED VISIT: This visit was performed by BOTH a physician and an APC. The substantive portion of the medical decision making was performed by this attesting physician who made or approved the management plan and takes responsibility for patient management.   I have personally had a face to face encounter with the patient.   My personal findings are documented below:    History:  89-year-old female with past medical history including coronary artery disease, hypertension, and COPD presents with syncope.  Patient complains of nausea vomiting diarrhea, felt lightheaded, slowing herself to the ground, woke up on the ground with head injury.  No chest pain, chronic shortness of breath, no palpitations.  Patient tested positive for COVID-19 at outpatient facility.    Physical Exam:  General: No acute distress.  HENT: NCAT, PERRL, Nares patent.  Eyes: no scleral icterus.  Neck: trachea midline, no ROM limitations.  CV: regular rhythm, regular rate.  Respiratory: normal effort, CTAB.  Abdomen: soft, nondistended, NTTP, no rebound tenderness, no guarding or rigidity.  Musculoskeletal: no deformity.  Neuro: alert, moves all extremities, follows commands.  Skin: warm, dry.    Assessment and Plan:  89-year-old female with past medical history including coronary Steev artery disease hypertension COPD presents with  syncope in the setting of nausea vomiting and diarrhea, COVID-positive. EKG nonischemic, high-sensitivity troponin 22, patient not anemic, creatinine mildly elevated 1.35 with BUN of 29, potassium normal, CT head and cervical spine imaging negative acute, chest x-ray shows no pulmonary infiltrates, orthostatic positive.  Patient receiving IV fluids in the ED.  Patient admitted to the observation unit, checking echocardiogram, IV fluids, antiemetics as needed, monitoring, trending labs, PT consulted.

## 2025-04-25 NOTE — PLAN OF CARE
Goal Outcome Evaluation:  Plan of Care Reviewed With: patient      Pt admitted to obs for syncopal episode. Pt has had weakness, n/v/d since Wednesday. Tested positive for covid this am at [facility]. NS @75/hr via dial-a-flow. Pt alert and orient times 4, NAD, up with assist, VSS. Plan is echo and cardiology consult in am. Pt and family updated on plan of care.   Progress: no change

## 2025-04-26 ENCOUNTER — READMISSION MANAGEMENT (OUTPATIENT)
Dept: CALL CENTER | Facility: HOSPITAL | Age: OVER 89
End: 2025-04-26
Payer: MEDICARE

## 2025-04-26 ENCOUNTER — APPOINTMENT (OUTPATIENT)
Dept: CARDIOLOGY | Facility: HOSPITAL | Age: OVER 89
End: 2025-04-26
Payer: MEDICARE

## 2025-04-26 VITALS
DIASTOLIC BLOOD PRESSURE: 59 MMHG | TEMPERATURE: 97.9 F | WEIGHT: 123 LBS | SYSTOLIC BLOOD PRESSURE: 134 MMHG | BODY MASS INDEX: 19.77 KG/M2 | HEIGHT: 66 IN | OXYGEN SATURATION: 98 % | RESPIRATION RATE: 18 BRPM | HEART RATE: 60 BPM

## 2025-04-26 PROBLEM — E86.0 DEHYDRATION: Status: ACTIVE | Noted: 2025-04-26

## 2025-04-26 LAB
ALBUMIN SERPL-MCNC: 3.4 G/DL (ref 3.5–5.2)
ALBUMIN/GLOB SERPL: 1.3 G/DL
ALP SERPL-CCNC: 47 U/L (ref 39–117)
ALT SERPL W P-5'-P-CCNC: 5 U/L (ref 1–33)
ANION GAP SERPL CALCULATED.3IONS-SCNC: 8.1 MMOL/L (ref 5–15)
AORTIC DIMENSIONLESS INDEX: 0.57 (DI)
AST SERPL-CCNC: 17 U/L (ref 1–32)
AV MEAN PRESS GRAD SYS DOP V1V2: 5.1 MMHG
AV VMAX SYS DOP: 152.2 CM/SEC
BACTERIA UR QL AUTO: ABNORMAL /HPF
BASOPHILS # BLD AUTO: 0.03 10*3/MM3 (ref 0–0.2)
BASOPHILS NFR BLD AUTO: 0.8 % (ref 0–1.5)
BH CV ECHO MEAS - AO MAX PG: 9.3 MMHG
BH CV ECHO MEAS - AO ROOT DIAM: 2.8 CM
BH CV ECHO MEAS - AO V2 VTI: 38.1 CM
BH CV ECHO MEAS - AVA(I,D): 1.8 CM2
BH CV ECHO MEAS - EDV(CUBED): 65.2 ML
BH CV ECHO MEAS - EDV(MOD-SP2): 60 ML
BH CV ECHO MEAS - EDV(MOD-SP4): 53 ML
BH CV ECHO MEAS - EF(MOD-SP2): 66.7 %
BH CV ECHO MEAS - EF(MOD-SP4): 62.3 %
BH CV ECHO MEAS - ESV(CUBED): 14.8 ML
BH CV ECHO MEAS - ESV(MOD-SP2): 20 ML
BH CV ECHO MEAS - ESV(MOD-SP4): 20 ML
BH CV ECHO MEAS - FS: 39 %
BH CV ECHO MEAS - IVS/LVPW: 1.04 CM
BH CV ECHO MEAS - IVSD: 0.81 CM
BH CV ECHO MEAS - LAT PEAK E' VEL: 6.4 CM/SEC
BH CV ECHO MEAS - LV DIASTOLIC VOL/BSA (35-75): 32.6 CM2
BH CV ECHO MEAS - LV MASS(C)D: 94.2 GRAMS
BH CV ECHO MEAS - LV MAX PG: 3.3 MMHG
BH CV ECHO MEAS - LV MEAN PG: 1.82 MMHG
BH CV ECHO MEAS - LV SYSTOLIC VOL/BSA (12-30): 12.3 CM2
BH CV ECHO MEAS - LV V1 MAX: 90.7 CM/SEC
BH CV ECHO MEAS - LV V1 VTI: 21.7 CM
BH CV ECHO MEAS - LVIDD: 4 CM
BH CV ECHO MEAS - LVIDS: 2.46 CM
BH CV ECHO MEAS - LVOT AREA: 3.2 CM2
BH CV ECHO MEAS - LVOT DIAM: 2.01 CM
BH CV ECHO MEAS - LVPWD: 0.78 CM
BH CV ECHO MEAS - MED PEAK E' VEL: 5.7 CM/SEC
BH CV ECHO MEAS - MV A DUR: 0.13 SEC
BH CV ECHO MEAS - MV A MAX VEL: 50.1 CM/SEC
BH CV ECHO MEAS - MV DEC SLOPE: 487.1 CM/SEC2
BH CV ECHO MEAS - MV DEC TIME: 0.22 SEC
BH CV ECHO MEAS - MV E MAX VEL: 127 CM/SEC
BH CV ECHO MEAS - MV E/A: 2.5
BH CV ECHO MEAS - MV MAX PG: 8.3 MMHG
BH CV ECHO MEAS - MV MEAN PG: 1.91 MMHG
BH CV ECHO MEAS - MV P1/2T: 78.7 MSEC
BH CV ECHO MEAS - MV V2 VTI: 36.1 CM
BH CV ECHO MEAS - MVA(P1/2T): 2.8 CM2
BH CV ECHO MEAS - MVA(VTI): 1.9 CM2
BH CV ECHO MEAS - PA ACC TIME: 0.16 SEC
BH CV ECHO MEAS - PA V2 MAX: 72.1 CM/SEC
BH CV ECHO MEAS - PULM A REVS DUR: 0.11 SEC
BH CV ECHO MEAS - PULM A REVS VEL: 21.3 CM/SEC
BH CV ECHO MEAS - PULM DIAS VEL: 67.3 CM/SEC
BH CV ECHO MEAS - PULM S/D: 0.62
BH CV ECHO MEAS - PULM SYS VEL: 41.6 CM/SEC
BH CV ECHO MEAS - QP/QS: 0.54
BH CV ECHO MEAS - RAP SYSTOLE: 3 MMHG
BH CV ECHO MEAS - RV MAX PG: 1.42 MMHG
BH CV ECHO MEAS - RV V1 MAX: 59.6 CM/SEC
BH CV ECHO MEAS - RV V1 VTI: 12.8 CM
BH CV ECHO MEAS - RVOT DIAM: 1.92 CM
BH CV ECHO MEAS - RVSP: 25 MMHG
BH CV ECHO MEAS - SV(LVOT): 68.6 ML
BH CV ECHO MEAS - SV(MOD-SP2): 40 ML
BH CV ECHO MEAS - SV(MOD-SP4): 33 ML
BH CV ECHO MEAS - SV(RVOT): 36.9 ML
BH CV ECHO MEAS - SVI(LVOT): 42.2 ML/M2
BH CV ECHO MEAS - SVI(MOD-SP2): 24.6 ML/M2
BH CV ECHO MEAS - SVI(MOD-SP4): 20.3 ML/M2
BH CV ECHO MEAS - TAPSE (>1.6): 2 CM
BH CV ECHO MEAS - TR MAX PG: 22.8 MMHG
BH CV ECHO MEAS - TR MAX VEL: 238.9 CM/SEC
BH CV ECHO MEASUREMENTS AVERAGE E/E' RATIO: 20.99
BH CV XLRA - RV BASE: 3.6 CM
BH CV XLRA - RV MID: 2.36 CM
BH CV XLRA - TDI S': 11.8 CM/SEC
BILIRUB SERPL-MCNC: 0.3 MG/DL (ref 0–1.2)
BILIRUB UR QL STRIP: NEGATIVE
BUN SERPL-MCNC: 28 MG/DL (ref 8–23)
BUN/CREAT SERPL: 25.7 (ref 7–25)
CALCIUM SPEC-SCNC: 8.4 MG/DL (ref 8.2–9.6)
CHLORIDE SERPL-SCNC: 108 MMOL/L (ref 98–107)
CLARITY UR: CLEAR
CO2 SERPL-SCNC: 24.9 MMOL/L (ref 22–29)
COLOR UR: ABNORMAL
CREAT SERPL-MCNC: 1.09 MG/DL (ref 0.57–1)
DEPRECATED RDW RBC AUTO: 44.2 FL (ref 37–54)
EGFRCR SERPLBLD CKD-EPI 2021: 47.8 ML/MIN/1.73
EOSINOPHIL # BLD AUTO: 0.08 10*3/MM3 (ref 0–0.4)
EOSINOPHIL NFR BLD AUTO: 2 % (ref 0.3–6.2)
ERYTHROCYTE [DISTWIDTH] IN BLOOD BY AUTOMATED COUNT: 12.7 % (ref 12.3–15.4)
GLOBULIN UR ELPH-MCNC: 2.6 GM/DL
GLUCOSE SERPL-MCNC: 93 MG/DL (ref 65–99)
GLUCOSE UR STRIP-MCNC: NEGATIVE MG/DL
HCT VFR BLD AUTO: 37.2 % (ref 34–46.6)
HGB BLD-MCNC: 11.9 G/DL (ref 12–15.9)
HGB UR QL STRIP.AUTO: NEGATIVE
HYALINE CASTS UR QL AUTO: ABNORMAL /LPF
IMM GRANULOCYTES # BLD AUTO: 0.01 10*3/MM3 (ref 0–0.05)
IMM GRANULOCYTES NFR BLD AUTO: 0.3 % (ref 0–0.5)
KETONES UR QL STRIP: ABNORMAL
LEFT ATRIUM VOLUME INDEX: 29.5 ML/M2
LEUKOCYTE ESTERASE UR QL STRIP.AUTO: NEGATIVE
LV EF BIPLANE MOD: 64.9 %
LYMPHOCYTES # BLD AUTO: 1.05 10*3/MM3 (ref 0.7–3.1)
LYMPHOCYTES NFR BLD AUTO: 26.8 % (ref 19.6–45.3)
MCH RBC QN AUTO: 30.1 PG (ref 26.6–33)
MCHC RBC AUTO-ENTMCNC: 32 G/DL (ref 31.5–35.7)
MCV RBC AUTO: 94.2 FL (ref 79–97)
MONOCYTES # BLD AUTO: 0.54 10*3/MM3 (ref 0.1–0.9)
MONOCYTES NFR BLD AUTO: 13.8 % (ref 5–12)
NEUTROPHILS NFR BLD AUTO: 2.21 10*3/MM3 (ref 1.7–7)
NEUTROPHILS NFR BLD AUTO: 56.3 % (ref 42.7–76)
NITRITE UR QL STRIP: NEGATIVE
NRBC BLD AUTO-RTO: 0 /100 WBC (ref 0–0.2)
PH UR STRIP.AUTO: 5.5 [PH] (ref 5–8)
PLATELET # BLD AUTO: 197 10*3/MM3 (ref 140–450)
PMV BLD AUTO: 10.1 FL (ref 6–12)
POTASSIUM SERPL-SCNC: 4.3 MMOL/L (ref 3.5–5.2)
PROT SERPL-MCNC: 6 G/DL (ref 6–8.5)
PROT UR QL STRIP: ABNORMAL
QT INTERVAL: 429 MS
QTC INTERVAL: 439 MS
RBC # BLD AUTO: 3.95 10*6/MM3 (ref 3.77–5.28)
RBC # UR STRIP: ABNORMAL /HPF
REF LAB TEST METHOD: ABNORMAL
SINUS: 2.9 CM
SODIUM SERPL-SCNC: 141 MMOL/L (ref 136–145)
SP GR UR STRIP: 1.02 (ref 1–1.03)
SQUAMOUS #/AREA URNS HPF: ABNORMAL /HPF
STJ: 2.7 CM
TSH SERPL DL<=0.05 MIU/L-ACNC: 2.83 UIU/ML (ref 0.27–4.2)
UROBILINOGEN UR QL STRIP: ABNORMAL
WBC # UR STRIP: ABNORMAL /HPF
WBC NRBC COR # BLD AUTO: 3.92 10*3/MM3 (ref 3.4–10.8)

## 2025-04-26 PROCEDURE — 93306 TTE W/DOPPLER COMPLETE: CPT

## 2025-04-26 PROCEDURE — 93242 EXT ECG>48HR<7D RECORDING: CPT

## 2025-04-26 PROCEDURE — G0378 HOSPITAL OBSERVATION PER HR: HCPCS

## 2025-04-26 PROCEDURE — 93010 ELECTROCARDIOGRAM REPORT: CPT | Performed by: INTERNAL MEDICINE

## 2025-04-26 PROCEDURE — 93306 TTE W/DOPPLER COMPLETE: CPT | Performed by: INTERNAL MEDICINE

## 2025-04-26 PROCEDURE — 96360 HYDRATION IV INFUSION INIT: CPT

## 2025-04-26 PROCEDURE — 81001 URINALYSIS AUTO W/SCOPE: CPT | Performed by: PHYSICIAN ASSISTANT

## 2025-04-26 PROCEDURE — 99204 OFFICE O/P NEW MOD 45 MIN: CPT | Performed by: INTERNAL MEDICINE

## 2025-04-26 PROCEDURE — 85025 COMPLETE CBC W/AUTO DIFF WBC: CPT | Performed by: PHYSICIAN ASSISTANT

## 2025-04-26 PROCEDURE — 25810000003 SODIUM CHLORIDE 0.9 % SOLUTION: Performed by: PHYSICIAN ASSISTANT

## 2025-04-26 PROCEDURE — 93005 ELECTROCARDIOGRAM TRACING: CPT | Performed by: PHYSICIAN ASSISTANT

## 2025-04-26 PROCEDURE — 80053 COMPREHEN METABOLIC PANEL: CPT | Performed by: PHYSICIAN ASSISTANT

## 2025-04-26 PROCEDURE — 84443 ASSAY THYROID STIM HORMONE: CPT | Performed by: PHYSICIAN ASSISTANT

## 2025-04-26 PROCEDURE — 25810000003 SODIUM CHLORIDE 0.9 % SOLUTION: Performed by: NURSE PRACTITIONER

## 2025-04-26 RX ADMIN — SODIUM CHLORIDE 500 ML: 9 INJECTION, SOLUTION INTRAVENOUS at 10:07

## 2025-04-26 RX ADMIN — Medication 10 ML: at 08:13

## 2025-04-26 RX ADMIN — LEVOTHYROXINE SODIUM 75 MCG: 0.03 TABLET ORAL at 05:32

## 2025-04-26 RX ADMIN — ASPIRIN 81 MG CHEWABLE TABLET 81 MG: 81 TABLET CHEWABLE at 08:09

## 2025-04-26 RX ADMIN — SODIUM CHLORIDE 75 ML/HR: 9 INJECTION, SOLUTION INTRAVENOUS at 08:51

## 2025-04-26 NOTE — PLAN OF CARE
Goal Outcome Evaluation:   Patient is alert and oriented times 4. Vital signs are stable. AVS and discharge instructions given to patient. Patient and daughter are agreeable with plan of care. Questions and concerns addressed. Up ad jamil with activity. Patient instructed to follow up with primary care and cardiology. IV removed. Holter monitor placed per cardiology. Patient left with daughter via private vehicle with all personal belongings.

## 2025-04-26 NOTE — DISCHARGE INSTRUCTIONS
Wear the ZIO monitor and turn in as directed.  Cardiology will arrange for outpatient follow-up.  Follow-up with primary care for interim management.  Be sure you are drinking plenty of fluids and remaining hydrated throughout your COVID-19 illness.  Avoiding dehydration will help avoid syncopal and near syncopal episodes.    Return to the ER with any currents of symptoms or should you have any further concerns.

## 2025-04-26 NOTE — PLAN OF CARE
Goal Outcome Evaluation:      Pt is a 89 yo female continuing observation after a fall at home. She has bruising around her left eye. Orthostatics positive. A&O x4. RA. NSR. Up to bedside commode. Standby assist. NS @ 75 mL/h. Daughter @ bedside. Echo later today. PT consulted. Urine sample needed.

## 2025-04-26 NOTE — DISCHARGE SUMMARY
ED OBSERVATION PROGRESS/DISCHARGE SUMMARY    Date of Admission: 4/25/2025   LOS: 0 days   PCP: Yuniel Bonilla MD    Final Diagnosis orthostasis, syncope      Subjective     Hospital Outcome: Discharge    Mela Muse is a 89 y.o. female comes in complaining of syncopal episode yesterday.  Patient reports she had had had bouts of nausea vomiting diarrhea yesterday.  She began to get lightheaded when up and moving.  Patient states she passed out and woke up on the ground having left elbow pain.  She denies chest pain, SOA or palpitations associated with the syncopal event.  She was seen at immediate care and noted to have low blood pressure and sent to the ED.  She tested positive for COVID-19 immediate care.    In the ED the patient's troponins are flat at 22 and 21, serum creatinine was 1.35 and patient typically is around 1.0 indicating she was dehydrated.  Chest x-ray showed mild cardiomegaly.  CT head without contrast showed scalp hematoma but no acute ICH or skull fracture.  CT C-spine showed no acute fracture but did show a spiculated nodule involving the right upper lobe measuring roughly 8 mm in size and this was similar to CT in December 2021.  Patient orthostatic in the ED.    12:46 PM.  Cardiology has evaluated.  Orthostasis, dehydration, mild CRISTA noted.  Patient getting IV fluids.  Blood pressures improved.  Patient still slightly orthostatic and cardiology recommends to continue with IV fluids.  TTE echo pending.  Paxlovid offered and declined again.  If echocardiogram is reassuring and orthostatics were resolved patient can be discharged.    Today's TTE echo shows normal LV function, LVEF between 61 to 65%.  LV diastolic function consistent with grade 2 impaired relaxation.  Patient showing no signs of heart failure in the form of shortness of breath or pedal edema.  Cardiology has cleared for discharge with a Zio patch.  They will schedule outpatient follow-up.      She is no longer  orthostatic after IV fluids.  She appears to have been very dry and has COVID-19 and this is almost certainly the cause of her syncopal event.  She is to continue with her current home medications.  Follow-up closely with primary care as an outpatient basis and to return to the ER with any further near syncopal symptoms.  Will stressed the importance of drinking plenty of fluids and remaining hydrated.     ROS:  General: Chills  Respiratory: no cough, dyspnea  Cardiovascular: Syncope  Abdomen: Nausea vomiting diarrhea  Neurologic: No focal weakness    Objective   Physical Exam:  I have reviewed the vital signs.  Temp:  [97.4 °F (36.3 °C)-98.8 °F (37.1 °C)] 97.9 °F (36.6 °C)  Heart Rate:  [56-81] 60  Resp:  [16-18] 18  BP: ()/(50-71) 134/59  General Appearance:    Alert, cooperative, no distress  Head:    Normocephalic, atraumatic  Eyes:    Sclerae anicteric  Neck:   Supple, no mass  Lungs: Clear to auscultation bilaterally, respirations unlabored  Heart: Regular rate and rhythm, S1 and S2 normal, no murmur, rub or gallop  Abdomen:  Soft, nontender, bowel sounds active, nondistended  Extremities: No clubbing, cyanosis, or edema to lower extremities  Pulses:  2+ and symmetric in distal lower extremities  Skin: No rashes   Neurologic: Oriented x3, Normal strength to extremities    Results Review:    I have reviewed the labs, radiology results and diagnostic studies.    Results from last 7 days   Lab Units 04/26/25  0128   WBC 10*3/mm3 3.92   HEMOGLOBIN g/dL 11.9*   HEMATOCRIT % 37.2   PLATELETS 10*3/mm3 197     Results from last 7 days   Lab Units 04/26/25  0128 04/25/25  1231   SODIUM mmol/L 141 143   POTASSIUM mmol/L 4.3 4.5   CHLORIDE mmol/L 108* 107   CO2 mmol/L 24.9 24.0   BUN mg/dL 28* 29*   CREATININE mg/dL 1.09* 1.35*   CALCIUM mg/dL 8.4 9.0   BILIRUBIN mg/dL 0.3 0.4   ALK PHOS U/L 47 59   ALT (SGPT) U/L 5 7   AST (SGOT) U/L 17 18   GLUCOSE mg/dL 93 102*     Imaging Results (Last 24 Hours)       ** No  results found for the last 24 hours. **            I have reviewed the medications.     Discharge Medications        ASK your doctor about these medications        Instructions Start Date   amLODIPine 2.5 MG tablet  Commonly known as: NORVASC   TAKE ONE TABLET BY MOUTH EVERY MORNING      aspirin 81 MG chewable tablet   81 mg, Daily      calcium carbonate 600 MG tablet  Commonly known as: OS-PAUL   600 mg, Daily      cholecalciferol 25 MCG (1000 UT) tablet  Commonly known as: VITAMIN D3   1,000 Units, Daily      levothyroxine 75 MCG tablet  Commonly known as: SYNTHROID, LEVOTHROID   75 mcg, Oral, Daily      magnesium oxide 400 (241.3 Mg) MG tablet tablet  Commonly known as: MAGOX   400 mg, Daily      meclizine 25 MG tablet  Commonly known as: ANTIVERT   25 mg, Oral, 3 Times Daily PRN      Qulipta 60 MG tablet  Generic drug: Atogepant   60 mg, Oral, Daily      VITAMIN B2 PO   1 tablet, Daily              ---------------------------------------------------------------------------------------------  Assessment & Plan   Assessment/Problem List    Orthostasis      Plan:    Orthostasis  CRISTA  COVID-19 positive, vomiting and diarrhea  -Orthostatic in ED.  - IV fluids, gentle  - Check echo   -continue IV fluids 75 mL/hr  - Patient declines Paxlovid  -Avoid nephrotoxic agents  -Continuous cardiac monitoring  - Cardiology has evaluated and the plan is as above     Essential hypertension  - Continue with home Norvasc     Hypothyroidism   -check TSH, continue home Synthroid            VTE Prophylaxis: SCDs  Mechanical VTE prophylaxis orders are present.    Disposition: Discharge    Follow-up after Discharge: Cardiology, primary care    This note will serve as a discharge summary    Raimundo Jose III, PA 04/26/25 15:25 EDT    I have worn appropriate PPE during this patient encounter, sanitized my hands both with entering and exiting patient's room.      32 minutes has been spent by [facility] providers in the care of this patient while under observation status on this date 04/26/25

## 2025-04-26 NOTE — H&P
[facility]   HISTORY AND PHYSICAL    Patient Name: Mela Muse  : 1933  MRN: [id number]  Primary Care Physician:  Yuniel Bonilla MD  Date of admission: 2025    Subjective   Subjective     Chief Complaint:   Chief Complaint   Patient presents with    Weakness - Generalized    Fall    Head Injury    Nausea    Vomiting    Diarrhea         HPI:    Mela Muse is a 89 y.o. female comes in complaining of syncopal episode yesterday.  Patient states that she had had some vomiting and diarrhea all day yesterday.  Patient then reported some lightheadedness especially upon moving.  Patient woke up on the ground and pain to her left elbow.  Patient denied any chest pain or shortness of breath has been new for her.  Patient states that she was seen at an urgent care center earlier today and sent to the ER for low blood pressure.  Patient also tested positive for COVID-19 at urgent care.  Patient denies any cough or congestion.  Patient denies any chest pain or, shortness of breath or lightheadedness currently.  Patient is unsure if she hit her head.  Patient also reports she had some diarrhea earlier today.  Patient states that she had COVID shots a few years ago but not within the last 6 months.  Family at bedside also to help with patient history.    In the ED, initial troponin 22, repeat troponin of 21.  Serum creatinine 1.35, BUN of 29, GFR of 36.  Magnesium slightly elevated 2.4.  CBC largely unremarkable for acute findings.  Chest x-ray shows mild cardiomegaly, otherwise no acute findings.  CT head without contrast shows small scalp hematoma in the supraorbital region in the left is noted otherwise no acute findings.  Spiculated nodule involving the right upper lobe posterior laterally measuring approximately 8 mm in size.  Similar to prior CT scan in 2021.  EKG shows sinus rhythm 68 bpm, no ST elevation apparent.  Patient is afebrile, pulse in the 50s, on room air oxygen 98%  "SpO2 and blood pressure normotensive.  Patient did have positive orthostatics in the ED.     Review of Systems   All systems were reviewed and negative except for: as per HPI    Personal History     Past Medical History:   Diagnosis Date    Anxiety     Arthritis     Cancer 01/2020    left breast    Cancer of skin of leg, right 2021    Coronary artery disease     \"BLOCKAGE IN MY LEGS\" stents bilateral legs per pt    COVID-19 virus infection 09/13/2021    Hemorrhoids     History of basal cell cancer     History of headaches     HL (hearing loss)     Humerus fracture     LEFT     Hypertension     Hypothyroidism     Melanoma 2017    LEFT LEG    Osteopenia     PAD (peripheral artery disease)     Skin cancer 2015    left nose    Skin cancer 2008    right leg    Urinary tract infection 10/12/22    Treated in October    Vertigo     Vestibular migraine 2021       Past Surgical History:   Procedure Laterality Date    BREAST LUMPECTOMY WITH SENTINEL NODE BIOPSY Left 02/05/2020    Procedure: BREAST LUMPECTOMY WITH NEEDLE LOCALIZATION;  Surgeon: Kush Plasencia MD;  Location: [facility];  Service: General;  Laterality: Left;    BREAST SURGERY Left 2020    BIOPSY    COLONOSCOPY      ALL OK    COSMETIC SURGERY      AFTER SKIN EXCISION FROM NOSE    ENDOSCOPY      EYE SURGERY Bilateral     CATARACTS    FRACTURE SURGERY  09/23/2021    Left arm    LEG SURGERY Left 2017    MELANOMA EXCISION-WITH SKIN GRAFT    ORIF HUMERUS FRACTURE Left 09/23/2021    Procedure: Open Reduction and Internal Fixation Proximal Humerus;  Surgeon: Sherman Panchal MD;  Location: [facility];  Service: Orthopedics;  Laterality: Left;    SKIN BIOPSY  2008    SKIN CANCER EXCISION Bilateral 2015    NOSE, UPPER LIP    SKIN GRAFT Bilateral     VASCULAR SURGERY Bilateral 2018    STENTS IN BOTH UPPER LEGS       Family History: family history includes Breast cancer in her maternal grandmother; Breast cancer (age of onset: 46) in her sister; Cancer in her " brother and sister; Colon cancer in her maternal grandmother; Esophageal cancer in her brother; Heart disease in her brother, father, and sister; Heart failure in her father; Leukemia in her daughter; Skin cancer in her father. Otherwise pertinent FHx was reviewed and not pertinent to current issue.    Social History:  reports that she quit smoking about 55 years ago. Her smoking use included cigarettes. She started smoking about 75 years ago. She has a 20 pack-year smoking history. She has never used smokeless tobacco. She reports that she does not drink alcohol and does not use drugs.    Home Medications:  Atogepant, Riboflavin, amLODIPine, aspirin, calcium carbonate, cholecalciferol, levothyroxine, magnesium oxide, and meclizine    Allergies:  Allergies   Allergen Reactions    Codeine Mental Status Change       Objective   Objective     Vitals:   Temp:  [97.2 °F (36.2 °C)-98.7 °F (37.1 °C)] 98.4 °F (36.9 °C)  Heart Rate:  [55-76] 60  Resp:  [16-20] 18  BP: ()/(51-77) 127/53  Physical Exam    Constitutional: Awake, alert   Eyes: PERRLA, sclerae anicteric, no conjunctival injection   HENT: NCAT, mucous membranes moist   Neck: Supple, no thyromegaly, no lymphadenopathy, trachea midline   Respiratory: Clear to auscultation bilaterally, nonlabored respirations    Cardiovascular: RRR, no murmurs, rubs, or gallops, palpable pedal pulses bilaterally   Gastrointestinal: Positive bowel sounds, soft, nontender, nondistended   Musculoskeletal: No bilateral ankle edema, no clubbing or cyanosis to extremities   Psychiatric: Appropriate affect, cooperative   Neurologic: Oriented x 3, strength symmetric in all extremities, Cranial Nerves grossly intact to confrontation, speech clear   Skin: No rashes     Result Review    Result Review:  I have personally reviewed the results from the time of this admission to 4/26/2025 00:07 EDT and agree with these findings:  [x]  Laboratory list / accordion  []  Microbiology  [x]   Radiology  [x]  EKG/Telemetry   []  Cardiology/Vascular   []  Pathology  []  Old records  []  Other:  Most notable findings include: See above      The ASCVD Risk score (Angelo GALARZA, et al., 2019) failed to calculate for the following reasons:    The 2019 ASCVD risk score is only valid for ages 40 to 79      Assessment & Plan   Assessment / Plan     Brief Patient Summary:  Mela Muse is a 89 y.o. female who comes in complaining of syncope.    Active Hospital Problems:  Active Hospital Problems    Diagnosis     **Orthostasis      Plan:       Orthostasis  CRISTA  COVID-19 positive, vomiting and diarrhea  -initial troponin 22, repeat troponin of 21.   - Serum creatinine 1.35, BUN of 29, GFR of 36.    -Magnesium slightly elevated 2.4.    -Chest x-ray shows mild cardiomegaly, otherwise no acute findings.    -CT head without contrast shows small scalp hematoma in the supraorbital region in the left is noted otherwise no acute findings.  Spiculated nodule involving the right upper lobe posterior laterally measuring approximately 8 mm in size.  Similar to prior CT scan in December 2021.    -EKG shows sinus rhythm 68 bpm, no ST elevation apparent.    -Patient is afebrile, pulse in the 50s, on room air oxygen 98% SpO2 and blood pressure normotensive.    -Patient did have positive orthostatics in the ED.   - Check echo   -continue IV fluids 75 mL/hr  -Paxlovid was initially ordered however in discussing this treatment option with patient and family, patient is concern for further diarrhea at this time.   -Avoid nephrotoxic agents  -Continuous cardiac monitoring  - Heart healthy diet    Essential hypertension, chronic   -hold home Norvasc for now, IV fluids as above, restart when appropriate    Hypothyroidism   -check TSH, continue home Synthroid              VTE Prophylaxis: SCDs  Mechanical VTE prophylaxis orders are present.        CODE STATUS:    Code Status (Patient has no pulse and is not breathing): CPR (Attempt to  Resuscitate)  Medical Interventions (Patient has pulse or is breathing): Full Support  Level Of Support Discussed With: Patient    Admission Status:  I believe this patient meets observation status.    77 minutes have been spent by [facility] providers in the care of this patient while under observation status.      Appropriate PPE worn during patient encounter.  Hand hygeine performed before and after seeing the patient.      Electronically signed by DEEPIKA Sanabria, 04/25/25, 8:31 PM EDT.

## 2025-04-26 NOTE — OUTREACH NOTE
Prep Survey      Flowsheet Row Responses   [facility] discharged from? [place]   Is LACE score < 7 ? No   Eligibility [facility]   Date of Admission 04/25/25   Date of Discharge 04/26/25   Discharge Disposition Home or Self Care   Discharge diagnosis Orthostasis   Does the patient have one of the following disease processes/diagnoses(primary or secondary)? Other   Does the patient have Home health ordered? No   Is there a DME ordered? No   Prep survey completed? Yes            SUSHILA GONZALES - Registered Nurse

## 2025-04-26 NOTE — PROGRESS NOTES
Patient Care Team:  Yuniel Bonilla MD as PCP - General  Yuniel Bonilla MD as PCP - Family Medicine  Self, Khoa Quick MD (Vascular Surgery)  Sima Joiner MD as Consulting Physician (Radiation Oncology)  Kush Plasencia MD as Referring Physician (Breast Surgery)  Yesi Ochoa MD as Consulting Physician (Hematology and Oncology)     BETTY KUNZ Progress Note    I supervised care provided by the midlevel provider. We have discussed this patient's history, physical exam, and treatment plan. I have reviewed the midlevel provider's note and I agree with the midlevel provider's findings and plan of care.   SHARED VISIT: This visit was performed by BOTH a physician and an APC. The substantive portion of the medical decision making was performed by this attesting physician who made or approved the management plan and takes responsibility for patient management.   I have personally had a face to face encounter with the patient.   My personal findings are documented below:    Subjective:  Denies any complaints, reports that she feels much better.    Physical Exam:  General: No acute distress.  HENT: NCAT, PERRL, Nares patent.  Eyes: no scleral icterus.  Neck: trachea midline, no ROM limitations.  CV: regular rhythm, regular rate.  Respiratory: normal effort, CTAB.  Abdomen: soft, nondistended, NTTP, no rebound tenderness, no guarding or rigidity.  Musculoskeletal: no deformity.  Neuro: alert, moves all extremities, follows commands.  Skin: warm, dry.    Assessment and Plan:  Patient admitted for orthostatic syncope after vomiting and diarrhea and COVID-positive patient, mild CRISTA, patient receiving IV fluids, and improved blood pressure but continued mild orthostasis today, giving additional IV fluids, checking out echocardiogram.  Offered Paxlovid therapy, extensive discussion risk and benefits, patient declined.  If echocardiogram is reassuring and orthostasis resolves, patient is desirous of discharge,  hopefully discharge this afternoon.

## 2025-04-26 NOTE — CONSULTS
Date of Hospital Visit: 2025  Encounter Provider: Mirza Gan MD  Place of Service: [facility] CARDIOLOGY  Patient Name: Mela Muse  :1933  Referral Provider: Tee Cortez MD    Chief complaint: Syncope     History of Present Illness    Mela Muse is a 89 year-old female with a past medical history significant for thrombocytosis, peripheral arterial disease, hypertension, COPD, vertigo, and history of breast cancer.     She presented to the ER yesterday complaining of a syncope episode. She reports having vomiting and diarrhea all day and then reported lightheadedness, especially with movement. She reports having a syncopal episode and waking up on the floor. She denied having chest pain or shortness of breath prior. She was evaluated at urgent care and was ultimately sent to the emergency department for low blood pressures.  She tested positive for COVID-19.  In the emergency room heart rate was in the 50s and she was normotensive, on room air and afebrile. EKG showed normal sinus rhythm with no ST elevation present. Workup included troponin 22, repeat 21, creatinine 1.35, magnesium 2.4.  CBC largely unremarkable.  Chest x-ray shows mild cardiomegaly, otherwise no acute findings.  CT of her head demonstrated a small scalp hematoma in supraorbital region in the left is noted otherwise no acute findings. There is also a spiculated nodule involving the right upper lobe posterior laterally measuring approximately 8 mm in size, similar to scan performed on 2021. She was admitted to the observation unit for further evaluation.    She originally had COVID in 2020 where she also had syncope.  She denies angina.  No orthopnea, PND or edema.  No palpitations, dizziness or syncope.  She does have orthostasis.  She has known history of vertigo.  No cardiac complaints at time of interview.  She is comfortable on room air.    HPI was reviewed, updated and amended  "when necessary.        ECHO 11/11/2021  Estimated right ventricular systolic pressure from tricuspid regurgitation is normal (<35 mmHg).  Calculated left ventricular EF = 62.8% Estimated left ventricular EF was in agreement with the calculated left ventricular EF. Left ventricular systolic function is normal.  Left ventricular diastolic function was normal.    Past Medical History:   Diagnosis Date    Anxiety     Arthritis     Cancer 01/2020    left breast    Cancer of skin of leg, right 2021    Coronary artery disease     \"BLOCKAGE IN MY LEGS\" stents bilateral legs per pt    COVID-19 virus infection 09/13/2021    Hemorrhoids     History of basal cell cancer     History of headaches     HL (hearing loss)     Humerus fracture     LEFT     Hypertension     Hypothyroidism     Melanoma 2017    LEFT LEG    Osteopenia     PAD (peripheral artery disease)     Skin cancer 2015    left nose    Skin cancer 2008    right leg    Urinary tract infection 10/12/22    Treated in October    Vertigo     Vestibular migraine 2021       Past Surgical History:   Procedure Laterality Date    BREAST LUMPECTOMY WITH SENTINEL NODE BIOPSY Left 02/05/2020    Procedure: BREAST LUMPECTOMY WITH NEEDLE LOCALIZATION;  Surgeon: Kush Plasencia MD;  Location: [facility];  Service: General;  Laterality: Left;    BREAST SURGERY Left 2020    BIOPSY    COLONOSCOPY      ALL OK    COSMETIC SURGERY      AFTER SKIN EXCISION FROM NOSE    ENDOSCOPY      EYE SURGERY Bilateral     CATARACTS    FRACTURE SURGERY  09/23/2021    Left arm    LEG SURGERY Left 2017    MELANOMA EXCISION-WITH SKIN GRAFT    ORIF HUMERUS FRACTURE Left 09/23/2021    Procedure: Open Reduction and Internal Fixation Proximal Humerus;  Surgeon: Sherman Panchal MD;  Location: [facility];  Service: Orthopedics;  Laterality: Left;    SKIN BIOPSY  2008    SKIN CANCER EXCISION Bilateral 2015    NOSE, UPPER LIP    SKIN GRAFT Bilateral     VASCULAR SURGERY Bilateral 2018    STENTS IN " BOTH UPPER LEGS       Medications Prior to Admission   Medication Sig Dispense Refill Last Dose/Taking    amLODIPine (NORVASC) 2.5 MG tablet TAKE ONE TABLET BY MOUTH EVERY MORNING 90 tablet 3 4/25/2025 Morning    aspirin 81 MG chewable tablet Chew 1 tablet Daily.   4/25/2025 Morning    Atogepant (Qulipta) 60 MG tablet Take 1 tablet by mouth Daily. 30 tablet 0 4/25/2025 Morning    calcium carbonate (OS-PAUL) 600 MG tablet Take 1 tablet by mouth Daily.   4/24/2025 Bedtime    cholecalciferol (VITAMIN D3) 25 MCG (1000 UT) tablet Take 1 tablet by mouth Daily.   4/25/2025 Morning    levothyroxine (SYNTHROID, LEVOTHROID) 75 MCG tablet TAKE 1 TABLET BY MOUTH DAILY 90 tablet 3 4/25/2025 Morning    magnesium oxide (MAGOX) 400 (241.3 Mg) MG tablet tablet Take 1 tablet by mouth Daily.   4/24/2025 Bedtime    meclizine (ANTIVERT) 25 MG tablet Take 1 tablet by mouth 3 (Three) Times a Day As Needed for Dizziness. 90 tablet 3 Past Week    Riboflavin (VITAMIN B2 PO) Take 1 tablet by mouth Daily.   4/25/2025 Morning       Current Meds  Scheduled Meds:[Held by provider] amLODIPine, 2.5 mg, Oral, Q24H  aspirin, 81 mg, Oral, Daily  levothyroxine, 75 mcg, Oral, Q AM  magnesium oxide, 400 mg, Oral, Nightly  sodium chloride, 10 mL, Intravenous, Q12H      Continuous Infusions:sodium chloride, 75 mL/hr, Last Rate: 75 mL/hr (04/25/25 2006)      PRN Meds:.  senna-docusate sodium **AND** polyethylene glycol **AND** bisacodyl **AND** bisacodyl    Calcium Replacement - Follow Nurse / BPA Driven Protocol    Magnesium Standard Dose Replacement - Follow Nurse / BPA Driven Protocol    nitroglycerin    Phosphorus Replacement - Follow Nurse / BPA Driven Protocol    Potassium Replacement - Follow Nurse / BPA Driven Protocol    sodium chloride    sodium chloride    sodium chloride    Allergies as of 04/25/2025 - Reviewed 04/25/2025   Allergen Reaction Noted    Codeine Mental Status Change 01/23/2020       Social History     Socioeconomic History    Marital  status:      Spouse name: Vladimir    Number of children: 4   Tobacco Use    Smoking status: Former     Current packs/day: 0.00     Average packs/day: 1 pack/day for 20.0 years (20.0 ttl pk-yrs)     Types: Cigarettes     Start date: 1950     Quit date: 1970     Years since quittin.3    Smokeless tobacco: Never    Tobacco comments:     20 years. None    Vaping Use    Vaping status: Never Used   Substance and Sexual Activity    Alcohol use: Never    Drug use: Never    Sexual activity: Not Currently     Partners: Male       Family History   Problem Relation Age of Onset    Heart failure Father     Skin cancer Father     Heart disease Father     Breast cancer Sister 46    Heart disease Sister     Esophageal cancer Brother     Heart disease Brother     Leukemia Daughter     Breast cancer Maternal Grandmother         70s    Colon cancer Maternal Grandmother     Cancer Sister         Breast    Cancer Brother         Esophageal    Malig Hyperthermia Neg Hx      Past surgical, medical, social family history was reviewed, updated and amended when necessary.    Review of Systems   Constitutional: Negative for chills and fever.   HENT:  Negative for hoarse voice and sore throat.    Eyes:  Negative for double vision and photophobia.   Cardiovascular:  Positive for dyspnea on exertion and syncope. Negative for chest pain, leg swelling, near-syncope, orthopnea, palpitations and paroxysmal nocturnal dyspnea.   Respiratory:  Negative for cough and wheezing.    Skin:  Negative for poor wound healing and rash.   Musculoskeletal:  Negative for arthritis and joint swelling.   Gastrointestinal:  Negative for bloating, abdominal pain, hematemesis and hematochezia.   Neurological:  Negative for dizziness and focal weakness.   Psychiatric/Behavioral:  Negative for depression and suicidal ideas.             Objective:   Temp:  [97.2 °F (36.2 °C)-98.8 °F (37.1 °C)] 97.7 °F (36.5 °C)  Heart Rate:  [55-81] 75  Resp:   "[16-20] 16  BP: ()/(51-77) 103/56  Body mass index is 19.9 kg/m².  Flowsheet Rows      Flowsheet Row First Filed Value   Admission Height 167.6 cm (66\") Documented at 04/25/2025 1416   Admission Weight 54.4 kg (120 lb) Documented at 04/25/2025 1416          Vitals:    04/26/25 0832   BP: 103/56   Pulse: 75   Resp:    Temp:    SpO2: 99%       Vitals reviewed.   Constitutional:       Appearance: Healthy appearance. Not in distress.   Neck:      Vascular: No JVR. JVD normal.   Pulmonary:      Effort: Pulmonary effort is normal.      Breath sounds: Normal breath sounds. No wheezing. No rhonchi. No rales.   Chest:      Chest wall: Not tender to palpatation.   Cardiovascular:      PMI at left midclavicular line. Normal rate. Regular rhythm. Normal S1. Normal S2.       Murmurs: There is no murmur.      No gallop.  No click. No rub.   Pulses:     Intact distal pulses.   Edema:     Peripheral edema absent.   Abdominal:      General: Bowel sounds are normal.      Palpations: Abdomen is soft.      Tenderness: There is no abdominal tenderness.   Musculoskeletal: Normal range of motion.         General: No tenderness. Skin:     General: Skin is warm and dry.   Neurological:      General: No focal deficit present.      Mental Status: Alert and oriented to person, place and time.                 Lab Review:      Results from last 7 days   Lab Units 04/26/25  0128   SODIUM mmol/L 141   POTASSIUM mmol/L 4.3   CHLORIDE mmol/L 108*   CO2 mmol/L 24.9   BUN mg/dL 28*   CREATININE mg/dL 1.09*   CALCIUM mg/dL 8.4   BILIRUBIN mg/dL 0.3   ALK PHOS U/L 47   ALT (SGPT) U/L 5   AST (SGOT) U/L 17   GLUCOSE mg/dL 93     Results from last 7 days   Lab Units 04/25/25  1335 04/25/25  1231   HSTROP T ng/L 21* 22*     @LABRCNTbnp@  Results from last 7 days   Lab Units 04/26/25  0128 04/25/25  1231   WBC 10*3/mm3 3.92 3.73   HEMOGLOBIN g/dL 11.9* 13.5   HEMATOCRIT % 37.2 40.3   PLATELETS 10*3/mm3 197 211         Results from last 7 days   Lab " Units 04/25/25  1231   MAGNESIUM mg/dL 2.4*     @LABRCNTIP(chol,trig,hdl,ldl)        I personally viewed and interpreted the patient's EKG/Telemetry data    Orthostasis    Assessment and Plan:    Syncope -multifactorial.  Her only other episode of syncope in life was at the same time of COVID in 2020.  Agree with fluid resuscitation.  Cardiogram has been ordered.  No evidence of arrhythmia on telemetry.  I do not feel that any other testing is required at this time.  COVID-19 infection  History of vertigo  Orthostatic hypotension -repeat orthostatic measurements in the morning.  She feels much better after fluid resuscitation.  She had decreased fluid intake with current infection.  CRISTA -.  Much improved after fluid resuscitation.    Mirza Gan MD  04/26/25  08:49 EDT.  Time spent in reviewing chart, discussion and examination:

## 2025-04-27 NOTE — CASE MANAGEMENT/SOCIAL WORK
Case Management Discharge Note    Final Note: Pt DC home prior to CCP completing screen. MARGOTH Fuentes/CCP     Selected Continued Care - Discharged on 4/26/2025 Admission date: 4/25/2025 - Discharge disposition: Home or Self Care     Transportation Services  Private: Car    Final Discharge Disposition Code: 01 - home or self-care

## 2025-04-28 ENCOUNTER — TRANSITIONAL CARE MANAGEMENT TELEPHONE ENCOUNTER (OUTPATIENT)
Dept: CALL CENTER | Facility: HOSPITAL | Age: OVER 89
End: 2025-04-28
Payer: MEDICARE

## 2025-04-28 NOTE — OUTREACH NOTE
Call Center TCM Note      Flowsheet Row Responses   [facility] patient discharged from? [place]   Does the patient have one of the following disease processes/diagnoses(primary or secondary)? Other   TCM attempt successful? Yes  [Nai Disla Dtrs]   Call start time 1003   Call end time 1006   Discharge diagnosis Orthostasis   Person spoke with today (if not patient) and relationship addis Trimble   Meds reviewed with patient/caregiver? Yes   Is the patient having any side effects they believe may be caused by any medication additions or changes? No   Does the patient have all medications ordered at discharge? Yes   Is the patient taking all medications as directed (includes completed medication regime)? Yes   Comments Awaiting for cardiology to call back with appt   Does the patient have an appointment with their PCP within 7-14 days of discharge? Yes  [4/29/2025  3:00 PM]   Psychosocial issues? No   Did the patient receive a copy of their discharge instructions? Yes   Nursing interventions Reviewed instructions with patient   What is the patient's perception of their health status since discharge? Improving   Is the patient/caregiver able to teach back signs and symptoms related to disease process for when to call PCP? Yes   Is the patient/caregiver able to teach back signs and symptoms related to disease process for when to call 911? Yes   Is the patient/caregiver able to teach back the hierarchy of who to call/visit for symptoms/problems? PCP, Specialist, Home health nurse, Urgent Care, ED, 911 Yes   If the patient is a current smoker, are they able to teach back resources for cessation? Not a smoker   TCM call completed? Yes   Wrap up additional comments addis Trimble states pt is doing so much better, and denies pt has any dizziness, n/v/d. No medication changes. Dtr verified PCP fu appt, and dtr is awaiting for cardiology office to call back with an appt.   Call end time 1006   Would this patient  benefit from a Referral to [facility]? No   Is the patient interested in additional calls from an ambulatory ? No            Geni LANGSTON - Registered Nurse    4/28/2025, 10:07 EDT         170.18

## 2025-04-29 ENCOUNTER — OFFICE VISIT (OUTPATIENT)
Dept: FAMILY MEDICINE CLINIC | Facility: CLINIC | Age: OVER 89
End: 2025-04-29
Payer: MEDICARE

## 2025-04-29 VITALS
SYSTOLIC BLOOD PRESSURE: 122 MMHG | DIASTOLIC BLOOD PRESSURE: 70 MMHG | OXYGEN SATURATION: 97 % | TEMPERATURE: 97.9 F | WEIGHT: 122 LBS | RESPIRATION RATE: 14 BRPM | HEART RATE: 64 BPM | HEIGHT: 66 IN | BODY MASS INDEX: 19.61 KG/M2

## 2025-04-29 DIAGNOSIS — I95.1 ORTHOSTASIS: ICD-10-CM

## 2025-04-29 DIAGNOSIS — Z09 HOSPITAL DISCHARGE FOLLOW-UP: Primary | ICD-10-CM

## 2025-04-29 DIAGNOSIS — U07.1 COVID-19: ICD-10-CM

## 2025-04-29 DIAGNOSIS — E86.0 DEHYDRATION: ICD-10-CM

## 2025-04-29 NOTE — PROGRESS NOTES
Transitional Care Follow Up Visit  Subjective     Mela Muse is a 89 y.o. female who presents for a transitional care management visit.    Within 48 business hours after discharge our office contacted her via telephone to coordinate her care and needs.      I reviewed and discussed the details of that call along with the discharge summary, hospital problems, inpatient lab results, inpatient diagnostic studies, and consultation reports with Mela.     Current outpatient and discharge medications have been reconciled for the patient.  Reviewed by: Linh Zepeda MD          4/26/2025     5:07 PM   Date of TCM Phone Call   [facility]   Date of Admission 4/25/2025   Date of Discharge 4/26/2025   Discharge Disposition Home or Self Care     Risk for Readmission (LACE) Score: 7 (4/26/2025  6:00 AM)      History of Present Illness   Pt presents today for hospital follow up.  She was dx with covid 19, dehydration, and dizziness and orthostasis.  She has heart monitor on till later today.  She states she is feeling much better today with some fatigue.  No dizziness.        Course During Hospital Stay:    Hospital Outcome: Discharge     Mela Muse is a 89 y.o. female comes in complaining of syncopal episode yesterday.  Patient reports she had had had bouts of nausea vomiting diarrhea yesterday.  She began to get lightheaded when up and moving.  Patient states she passed out and woke up on the ground having left elbow pain.  She denies chest pain, SOA or palpitations associated with the syncopal event.  She was seen at immediate care and noted to have low blood pressure and sent to the ED.  She tested positive for COVID-19 immediate care.     In the ED the patient's troponins are flat at 22 and 21, serum creatinine was 1.35 and patient typically is around 1.0 indicating she was dehydrated.  Chest x-ray showed mild cardiomegaly.  CT head without contrast showed scalp hematoma but no  "acute ICH or skull fracture.  CT C-spine showed no acute fracture but did show a spiculated nodule involving the right upper lobe measuring roughly 8 mm in size and this was similar to CT in December 2021.  Patient orthostatic in the ED.     12:46 PM.  Cardiology has evaluated.  Orthostasis, dehydration, mild CRISTA noted.  Patient getting IV fluids.  Blood pressures improved.  Patient still slightly orthostatic and cardiology recommends to continue with IV fluids.  TTE echo pending.  Paxlovid offered and declined again.  If echocardiogram is reassuring and orthostatics were resolved patient can be discharged.     Today's TTE echo shows normal LV function, LVEF between 61 to 65%.  LV diastolic function consistent with grade 2 impaired relaxation.  Patient showing no signs of heart failure in the form of shortness of breath or pedal edema.  Cardiology has cleared for discharge with a Zio patch.  They will schedule outpatient follow-up.       She is no longer orthostatic after IV fluids.  She appears to have been very dry and has COVID-19 and this is almost certainly the cause of her syncopal event.  She is to continue with her current home medications.  Follow-up closely with primary care as an outpatient basis and to return to the ER with any further near syncopal symptoms.  Will stressed the importance of drinking plenty of fluids and remaining hydrated.      The following portions of the patient's history were reviewed and updated as appropriate: allergies, current medications, past family history, past medical history, past social history, past surgical history, and problem list.    Review of Systems    Objective   /70 (BP Location: Right arm, Patient Position: Sitting)   Pulse 64   Temp 97.9 °F (36.6 °C)   Resp 14   Ht 167.6 cm (66\")   Wt 55.3 kg (122 lb)   SpO2 97%   BMI 19.69 kg/m²   Physical Exam  Vitals and nursing note reviewed.   Constitutional:       Appearance: Normal appearance. She is " well-developed.   HENT:      Head:      Comments: Bruising around eye     Mouth/Throat:      Mouth: Mucous membranes are moist.      Pharynx: Oropharynx is clear. No oropharyngeal exudate or posterior oropharyngeal erythema.   Eyes:      General: No scleral icterus.        Right eye: No discharge.         Left eye: No discharge.      Extraocular Movements: Extraocular movements intact.      Pupils: Pupils are equal, round, and reactive to light.   Cardiovascular:      Rate and Rhythm: Normal rate and regular rhythm.      Heart sounds: Normal heart sounds. No murmur heard.  Pulmonary:      Effort: Pulmonary effort is normal. No respiratory distress.      Breath sounds: Normal breath sounds. No stridor. No wheezing or rhonchi.   Neurological:      General: No focal deficit present.      Mental Status: She is alert and oriented to person, place, and time. She is not disoriented.   Psychiatric:         Mood and Affect: Mood normal.         Behavior: Behavior normal.         Assessment & Plan   Problems Addressed this Visit       Orthostasis    COVID-19    Dehydration    Hospital discharge follow-up - Primary     Diagnoses         Codes Comments      Hospital discharge follow-up    -  Primary ICD-10-CM: Z09  ICD-9-CM: V67.59       Dehydration     ICD-10-CM: E86.0  ICD-9-CM: 276.51       COVID-19     ICD-10-CM: U07.1  ICD-9-CM: 079.89       Orthostasis     ICD-10-CM: I95.1  ICD-9-CM: 458.0                  I requested and reviewed all records, labs, and diagnostics from the hospital with patient and family.  Patient is to follow-up with specialists as discussed and directed.    Continue current plan.

## 2025-05-01 ENCOUNTER — TELEPHONE (OUTPATIENT)
Dept: CASE MANAGEMENT | Facility: OTHER | Age: OVER 89
End: 2025-05-01
Payer: MEDICARE

## 2025-05-02 ENCOUNTER — TELEPHONE (OUTPATIENT)
Dept: CASE MANAGEMENT | Facility: OTHER | Age: OVER 89
End: 2025-05-02
Payer: MEDICARE

## 2025-05-06 ENCOUNTER — PATIENT OUTREACH (OUTPATIENT)
Dept: CASE MANAGEMENT | Facility: OTHER | Age: OVER 89
End: 2025-05-06
Payer: MEDICARE

## 2025-05-06 LAB
CV ZIO BASELINE AVG BPM: 62 BPM
CV ZIO BASELINE BPM HIGH: 158 BPM
CV ZIO BASELINE BPM LOW: 48 BPM
CV ZIO DEVICE ANALYSIS TIME: NORMAL
CV ZIO ECT SVE COUNT: 1103 EPISODES
CV ZIO ECT SVE CPLT COUNT: 67 EPISODES
CV ZIO ECT SVE CPLT FREQ: NORMAL
CV ZIO ECT SVE FREQ: NORMAL
CV ZIO ECT SVE TPLT COUNT: 19 EPISODES
CV ZIO ECT SVE TPLT FREQ: NORMAL
CV ZIO ECT VE COUNT: 1804 EPISODES
CV ZIO ECT VE CPLT COUNT: 12 EPISODES
CV ZIO ECT VE CPLT FREQ: NORMAL
CV ZIO ECT VE FREQ: NORMAL
CV ZIO ECT VE TPLT COUNT: 0 EPISODES
CV ZIO ECT VE TPLT FREQ: 0
CV ZIO ECTOPIC SVE COUPLET RAW PERCENT: 0.05 %
CV ZIO ECTOPIC SVE ISOLATED PERCENT: 0.44 %
CV ZIO ECTOPIC SVE TRIPLET RAW PERCENT: 0.02 %
CV ZIO ECTOPIC VE COUPLET RAW PERCENT: 0.01 %
CV ZIO ECTOPIC VE ISOLATED PERCENT: 0.72 %
CV ZIO ECTOPIC VE TRIPLET RAW PERCENT: 0 %
CV ZIO ENROLLMENT END: NORMAL
CV ZIO ENROLLMENT START: NORMAL
CV ZIO L TRIGEMINY DUR: 13 SEC
CV ZIO L TRIGEMINY END: NORMAL
CV ZIO L TRIGEMINY START: NORMAL
CV ZIO PATIENT EVENTS DIARIES: 7
CV ZIO PATIENT EVENTS TRIGGERS: 5
CV ZIO PAUSE COUNT: 0
CV ZIO PRESCRIPTION STATUS: NORMAL
CV ZIO SVT AVG BPM: 114 BPM
CV ZIO SVT BPM HIGH: 158 BPM
CV ZIO SVT BPM LOW: 56 BPM
CV ZIO SVT COUNT: 20
CV ZIO SVT F EPI AVG BPM: 123 BPM
CV ZIO SVT F EPI BEATS: 23 BEATS
CV ZIO SVT F EPI BPM HIGH: 158 BPM
CV ZIO SVT F EPI BPM LOW: 91 BPM
CV ZIO SVT F EPI DUR: 11.4 SEC
CV ZIO SVT F EPI END: NORMAL
CV ZIO SVT F EPI START: NORMAL
CV ZIO SVT L EPI AVG BPM: 132 BPM
CV ZIO SVT L EPI BEATS: 31 BEATS
CV ZIO SVT L EPI BPM HIGH: 156 BPM
CV ZIO SVT L EPI BPM LOW: 107 BPM
CV ZIO SVT L EPI DUR: 14 SEC
CV ZIO SVT L EPI END: NORMAL
CV ZIO SVT L EPI START: NORMAL
CV ZIO TOTAL  ENROLLMENT PERIOD: NORMAL
CV ZIO VT COUNT: 0

## 2025-05-06 PROCEDURE — 93244 EXT ECG>48HR<7D REV&INTERPJ: CPT | Performed by: INTERNAL MEDICINE

## 2025-05-06 NOTE — OUTREACH NOTE
AMBULATORY CASE MANAGEMENT NOTE    Names and Relationships of Patient/Support Persons: Contact: LASHONDA TRUONG; Relationship: Emergency Contact -     [facility] Interim Update    Called and spoke to patient's daughter (Lashonda) on verbal release. Introduced self, role and reason for the call. Per daughter patient is doing well and they are getting ready to go out today. Patient continues to stay well hydrated. Appointment with Cardiology is schedule for 6/4. Daughter denies urgent needs or further assistance at this time. Encouraged to reach out should needs arises.     Education Documentation  No documentation found.        Louie RUTHERFORD  Ambulatory Case Management    5/6/2025, 14:24 EDT

## 2025-05-07 ENCOUNTER — READMISSION MANAGEMENT (OUTPATIENT)
Dept: CALL CENTER | Facility: HOSPITAL | Age: OVER 89
End: 2025-05-07
Payer: MEDICARE

## 2025-05-07 NOTE — OUTREACH NOTE
Medical Week 2 Survey      Flowsheet Row Responses   [facility] patient discharged from? [place]   Does the patient have one of the following disease processes/diagnoses(primary or secondary)? Other   Week 2 attempt successful? No   Unsuccessful attempts Attempt 1            SUSHILA GONZALES - Registered Nurse

## 2025-05-13 ENCOUNTER — READMISSION MANAGEMENT (OUTPATIENT)
Dept: CALL CENTER | Facility: HOSPITAL | Age: OVER 89
End: 2025-05-13
Payer: MEDICARE

## 2025-05-13 NOTE — OUTREACH NOTE
Medical Week 2 Survey      Flowsheet Row Responses   [facility] [place] patient discharged from? [place]   Does the patient have one of the following disease processes/diagnoses(primary or secondary)? Other   Week 2 attempt successful? No   Unsuccessful attempts Attempt 2   Revoke Vincenzo Shine Registered Nurse

## 2025-06-04 ENCOUNTER — OFFICE VISIT (OUTPATIENT)
Dept: CARDIOLOGY | Age: OVER 89
End: 2025-06-04
Payer: MEDICARE

## 2025-06-04 VITALS
HEIGHT: 66 IN | HEART RATE: 60 BPM | BODY MASS INDEX: 19.29 KG/M2 | SYSTOLIC BLOOD PRESSURE: 102 MMHG | DIASTOLIC BLOOD PRESSURE: 50 MMHG | WEIGHT: 120 LBS

## 2025-06-04 DIAGNOSIS — I34.0 MITRAL VALVE INSUFFICIENCY, UNSPECIFIED ETIOLOGY: ICD-10-CM

## 2025-06-04 DIAGNOSIS — R55 SYNCOPE AND COLLAPSE: Primary | ICD-10-CM

## 2025-06-04 DIAGNOSIS — I95.89 OTHER SPECIFIED HYPOTENSION: ICD-10-CM

## 2025-06-04 RX ORDER — DIPHENOXYLATE HYDROCHLORIDE AND ATROPINE SULFATE 2.5; .025 MG/1; MG/1
TABLET ORAL DAILY
COMMUNITY

## 2025-06-04 NOTE — PROGRESS NOTES
Date of Office Visit: 25  Encounter Provider: DOMINGUEZ Jerry  Place of Service: [facility] CARDIOLOGY  Patient Name: Mela Muse  :1933    Chief Complaint   Patient presents with    Follow-up   :     HPI: Mela Muse is a 89 y.o. female  with syncope, mitral valve regurgitation, tricuspid valve regurgitation, nonsustained SVT, thrombocytosis, peripheral arterial disease, hypertension, COPD, vertigo, and history of breast cancer.     She is followed by Dr. Mirza Gan.  I will visit with her for the first time and have reviewed her medical record.    She presented urgent care on 2025 and found to be dehydrated and tested positive for COVID-19..  She reportedly had vomiting and diarrhea all day.  She had a fall and reported generalized weakness.  She had left orbital contusion.  She then was evaluated in the ER and admitted for syncope.  IV fluids were given and patient declined Paxlovid for COVID-19 positive test.  Echocardiogram 25 showed EF 61-65%, grade 2 diastolic dysfunction, normal RV cavity size and function, mild to moderately dilated left atrial cavity, mild to moderate mitral valve regurgitation, mild to moderate tricuspid valve regurgitation with normal RVSP.  Trivial pericardial effusion was noted.  2-day 19-hour Holter monitor was relatively benign with nonsustained SVT lasting up to 14 seconds.    She presents today accompanied by her daughter.  She has a lot of dizziness today.  No near-syncope [place].  No chest pain or swelling or palpitation.  She has had shortness of breath chronically for 5 years.  She did yoga yesterday and was able to tolerate that without shortness of breath but then she went to the grocery store and had to lie down for the rest of the day.  Allergies   Allergen Reactions    Codeine Mental Status Change           Family and social history reviewed.     ROS  All other systems were reviewed and are  "negative          Objective:     Vitals:    06/04/25 1041   BP: 102/50   BP Location: Left arm   Patient Position: Sitting   Cuff Size: Small Adult   Pulse: 60   Weight: 54.4 kg (120 lb)   Height: 167.6 cm (66\")     Body mass index is 19.37 kg/m².    PHYSICAL EXAM:  Pulmonary:      Effort: Pulmonary effort is normal.      Breath sounds: Normal breath sounds.   Cardiovascular:      Normal rate. Regular rhythm.           ECG 12 Lead    Date/Time: 6/4/2025 11:26 AM  Performed by: Love Woodward APRN    Authorized by: Love Woodward APRN  Comparison: compared with previous ECG   Similar to previous ECG  Rate: normal  T inversion: III and aVF    Clinical impression: abnormal EKG            Current Outpatient Medications   Medication Sig Dispense Refill    aspirin 81 MG chewable tablet Chew 1 tablet Daily.      calcium carbonate (OS-PAUL) 600 MG tablet Take 1 tablet by mouth Daily.      cholecalciferol (VITAMIN D3) 25 MCG (1000 UT) tablet Take 1 tablet by mouth Daily.      levothyroxine (SYNTHROID, LEVOTHROID) 75 MCG tablet TAKE 1 TABLET BY MOUTH DAILY 90 tablet 3    magnesium oxide (MAGOX) 400 (241.3 Mg) MG tablet tablet Take 1 tablet by mouth Daily.      meclizine (ANTIVERT) 25 MG tablet Take 1 tablet by mouth 3 (Three) Times a Day As Needed for Dizziness. 90 tablet 3    multivitamin (MULTI VITAMIN PO) Take  by mouth Daily.      Riboflavin (VITAMIN B2 PO) Take 1 tablet by mouth Daily.      VITAMIN K PO Take  by mouth.      Atogepant (Qulipta) 60 MG tablet Take 1 tablet by mouth Daily. (Patient not taking: Reported on 6/4/2025) 30 tablet 0     No current facility-administered medications for this visit.     Assessment:       Diagnosis Plan   1. Syncope and collapse        2. Other specified hypotension        3. Mitral valve insufficiency, unspecified etiology             No orders of the defined types were placed in this encounter.        Plan:       1.  89-year-old female with syncope.  Multifactorial due to " dehydration, orthostatic hypotension and viral illness.  She continues to have dizziness.  See #2.  2.  History of hypertension-she is on amlodipine 2.5 mg daily.  Her blood pressure is 102/50.  She is very dizzy today so I am going to stop amlodipine 2.5 mg daily at this time.  Have asked her daughter to purchase an [employer] upper arm automatic cuff and start blood pressure recording at home and keep a list.  We discussed at her age that blood pressure can be anywhere between 110-140/60-80 and stable.  If she overall remains in that range then I would keep her off amlodipine at this time.  3.  History of vertigo-she has meclizine  4.  Mild to moderate mitral valve regurgitation on echo April 2025  5.  Nonsustained supraventricular tachycardia- denies palpitations. Would follow clinically   6.Tricuspid valve regurgitation mild to moderate with normal RVSP  7.COVID-19 infection April 2025  8.thrombocytosis  9.peripheral arterial disease  10. COPD  11.vertigo  12. history of breast cancer.     In 8 weeks.  Call sooner with any questions or concerns.            It has been a pleasure to participate in this patient's care.      Thank you,  DOMINGUEZ Jerry      **I used Dragon to dictate this note:**

## 2025-07-29 NOTE — PROGRESS NOTES
RM:________                            : 1933  AGE: 89 y.o.      WT: ____________ HT: ______ BP: __________ HR ______   02% _______                   VISIT:   F/U   HOSP  CC __________________________ OTHER _________                                                  PACER/ ICD        EKG TODAY:  Y /  N        SMOKING: Y / N   PPD ________      EXERCISE: ____________________________________________________      SLEEP STUDY : Y / N     POS / NEG    CPAP / BIPAP     WEARING:  Y / N       DIAGNOSIS: ___________________________________   REFILLS  Y / N      CP _____  SOA______ EDEMA_____ DIZZINESS____ PALPITATIONS____

## 2025-07-30 ENCOUNTER — OFFICE VISIT (OUTPATIENT)
Dept: CARDIOLOGY | Age: OVER 89
End: 2025-07-30
Payer: MEDICARE

## 2025-07-30 VITALS
WEIGHT: 118 LBS | HEIGHT: 66 IN | HEART RATE: 62 BPM | SYSTOLIC BLOOD PRESSURE: 120 MMHG | DIASTOLIC BLOOD PRESSURE: 60 MMHG | BODY MASS INDEX: 18.96 KG/M2 | OXYGEN SATURATION: 99 %

## 2025-07-30 DIAGNOSIS — R55 SYNCOPE AND COLLAPSE: Primary | ICD-10-CM

## 2025-07-30 PROCEDURE — 99214 OFFICE O/P EST MOD 30 MIN: CPT | Performed by: NURSE PRACTITIONER

## 2025-07-30 PROCEDURE — 1159F MED LIST DOCD IN RCRD: CPT | Performed by: NURSE PRACTITIONER

## 2025-07-30 PROCEDURE — 1160F RVW MEDS BY RX/DR IN RCRD: CPT | Performed by: NURSE PRACTITIONER

## 2025-07-30 NOTE — PROGRESS NOTES
Date of Office Visit: 25  Encounter Provider: DOMINGUEZ Jerry  Place of Service: [facility] CARDIOLOGY  Patient Name: Mela Muse  :1933    Chief Complaint   Patient presents with    Syncope and collapse    Hypertension    Carotid Artery Disease   :     HPI: Mela Muse is a 89 y.o. female  with syncope, mitral valve regurgitation, tricuspid valve regurgitation, nonsustained SVT, thrombocytosis, peripheral arterial disease, hypertension, COPD, vertigo, and history of breast cancer.      She is followed by Dr. Mirza Gan.  I will visit with her for the first time and have reviewed her medical record.     She presented urgent care on 2025 and found to be dehydrated and tested positive for COVID-19..  She reportedly had vomiting and diarrhea all day.  She had a fall and reported generalized weakness.  She had left orbital contusion.  She then was evaluated in the ER and admitted for syncope.  IV fluids were given and patient declined Paxlovid for COVID-19 positive test.  Echocardiogram 25 showed EF 61-65%, grade 2 diastolic dysfunction, normal RV cavity size and function, mild to moderately dilated left atrial cavity, mild to moderate mitral valve regurgitation, mild to moderate tricuspid valve regurgitation with normal RVSP.  Trivial pericardial effusion was noted.  2-day 19-hour Holter monitor was relatively benign with nonsustained SVT lasting up to 14 seconds.    She presents today for reassessment about a list of her blood pressure readings since stopping amlodipine.  She continues complain of low energy and shortness of breath but this is not new or worse.  No near-syncope or syncope.  No chest pain or edema or palpitations.  She has blood pressure readings 98-1 10 systolic often.  She has other readings such as 136/70.  She has a lot of readings 110-130/60-70.          Allergies   Allergen Reactions    Codeine Mental Status Change  "          Family and social history reviewed.     ROS  All other systems were reviewed and are negative          Objective:     Vitals:    07/30/25 1057   BP: 120/60   BP Location: Left arm   Patient Position: Sitting   Cuff Size: Adult   Pulse: 62   SpO2: 99%   Weight: 53.5 kg (118 lb)   Height: 167.6 cm (66\")     Body mass index is 19.05 kg/m².    PHYSICAL EXAM:  Pulmonary:      Effort: Pulmonary effort is normal.      Breath sounds: Normal breath sounds.   Cardiovascular:      Normal rate. Regular rhythm.   Edema:     Ankle: trace edema of the left ankle.        Procedures      Current Outpatient Medications   Medication Sig Dispense Refill    aspirin 81 MG chewable tablet Chew 1 tablet Daily.      calcium carbonate (OS-PAUL) 600 MG tablet Take 1 tablet by mouth Daily.      cholecalciferol (VITAMIN D3) 25 MCG (1000 UT) tablet Take 1 tablet by mouth Daily.      levothyroxine (SYNTHROID, LEVOTHROID) 75 MCG tablet TAKE 1 TABLET BY MOUTH DAILY 90 tablet 3    magnesium oxide (MAGOX) 400 (241.3 Mg) MG tablet tablet Take 1 tablet by mouth Daily.       No current facility-administered medications for this visit.     Assessment:       Diagnosis Plan   1. Syncope and collapse             No orders of the defined types were placed in this encounter.        Plan:       1.  89-year-old female with syncope.  Multifactorial due to dehydration, orthostatic hypotension and viral illness.  She continues to have dizziness but no near syncope  2.  History of hypertension-she is to remain off amlodipine. Her home bp list is stable   3.  History of vertigo-she has meclizine  4.  Mild to moderate mitral valve regurgitation on echo April 2025  5.  Nonsustained supraventricular tachycardia- denies palpitations. Would follow clinically   6.Tricuspid valve regurgitation mild to moderate with normal RVSP  7.COVID-19 infection April 2025  8.thrombocytosis  9.peripheral arterial disease  10. COPD  11.vertigo  12. history of breast cancer. "   13.  Hearing loss due to cerumen impaction      She will follow-up with her PCP and return to cardiology clinic as needed based on her preference.      It has been a pleasure to participate in this patient's care.      Thank you,  DOMINGUEZ Jerry      **I used Dragon to dictate this note:**

## (undated) DEVICE — OPTIFOAM GENTLE SA, POSTOP, 4X8: Brand: MEDLINE

## (undated) DEVICE — DRAPE,U/ SHT,SPLIT,PLAS,STERIL: Brand: MEDLINE

## (undated) DEVICE — PENCL E/S ULTRAVAC TELESCP NOSE HOLSTR 10FT

## (undated) DEVICE — STPLR SKIN VISISTAT WD 35CT

## (undated) DEVICE — KWIRE TROC/TP SS 1.6X150MM NS
Type: IMPLANTABLE DEVICE | Site: HUMERUS | Status: NON-FUNCTIONAL
Removed: 2021-09-23

## (undated) DEVICE — HANDPIECE SET WITH COAXIAL HIGH FLOW TIP AND SUCTION TUBE: Brand: INTERPULSE

## (undated) DEVICE — TRAP FLD MINIVAC MEGADYNE 100ML

## (undated) DEVICE — NDL HYPO ECLPS SFTY 22G 1 1/2IN

## (undated) DEVICE — 3M™ STERI-STRIP™ REINFORCED ADHESIVE SKIN CLOSURES, R1547, 1/2 IN X 4 IN (12 MM X 100 MM), 6 STRIPS/ENVELOPE: Brand: 3M™ STERI-STRIP™

## (undated) DEVICE — GLV SURG PREMIERPRO ORTHO LTX PF SZ8 BRN

## (undated) DEVICE — 3M™ STERI-STRIP™ COMPOUND BENZOIN TINCTURE 40 BAGS/CARTON 4 CARTONS/CASE C1544: Brand: 3M™ STERI-STRIP™

## (undated) DEVICE — Device: Brand: FABCO

## (undated) DEVICE — ELECTRD BLD EDGE/INSUL1P 2.4X5.1MM STRL

## (undated) DEVICE — TUBING, SUCTION, 1/4" X 20', STRAIGHT: Brand: MEDLINE INDUSTRIES, INC.

## (undated) DEVICE — APPL CHLORAPREP HI/LITE 26ML ORNG

## (undated) DEVICE — ANTIBACTERIAL UNDYED BRAIDED (POLYGLACTIN 910), SYNTHETIC ABSORBABLE SUTURE: Brand: COATED VICRYL

## (undated) DEVICE — MAT FLR ABSORBENT LG 4FT 10 2.5FT

## (undated) DEVICE — SOL ISO/ALC RUB 70PCT 4OZ

## (undated) DEVICE — PENCL E/S HNDSWTCH SMOKEEVAC HOLSTR 10FT

## (undated) DEVICE — INTENT TO BE USED WITH SUTURE MATERIAL FOR TISSUE CLOSURE: Brand: RICHARD-ALLAN® NEEDLE 1/2 CIRCLE TAPER

## (undated) DEVICE — BIT DRL QC DIA 2.5X110MM

## (undated) DEVICE — DRSNG SURESITE WNDW 4X4.5

## (undated) DEVICE — ADHS SKIN SURG TISS VISC PREMIERPRO EXOFIN HI/VISC FAST/DRY

## (undated) DEVICE — INTENDED FOR TISSUE SEPARATION, AND OTHER PROCEDURES THAT REQUIRE A SHARP SURGICAL BLADE TO PUNCTURE OR CUT.: Brand: BARD-PARKER ® CARBON RIB-BACK BLADES

## (undated) DEVICE — SKIN PREP TRAY W/CHG: Brand: MEDLINE INDUSTRIES, INC.

## (undated) DEVICE — SUT VIC 3/0 TIES 18IN J110T

## (undated) DEVICE — PATIENT RETURN ELECTRODE, SINGLE-USE, CONTACT QUALITY MONITORING, ADULT, WITH 9FT CORD, FOR PATIENTS WEIGING OVER 33LBS. (15KG): Brand: MEGADYNE

## (undated) DEVICE — GLV SURG SIGNATURE ESSENTIAL PF LTX SZ8.5

## (undated) DEVICE — PREP SOL POVIDONE/IODINE BT 4OZ

## (undated) DEVICE — SUT VIC 0 CT1 36IN J946H

## (undated) DEVICE — DRP C/ARM 41X74IN

## (undated) DEVICE — SUT VIC 2/0 CT2 27IN J269H

## (undated) DEVICE — SUT SILK 2/0 FS BLK 18IN 685G

## (undated) DEVICE — PK SHLDR OPN 40

## (undated) DEVICE — BNDG ESMARK 4IN 12FT LF STRL BLU

## (undated) DEVICE — GLV SURG BIOGEL LTX PF 8 1/2

## (undated) DEVICE — CONTAINER,SPECIMEN,OR STERILE,4OZ: Brand: MEDLINE

## (undated) DEVICE — Device

## (undated) DEVICE — PK CHST BRST 40